# Patient Record
Sex: MALE | Race: WHITE | NOT HISPANIC OR LATINO | Employment: FULL TIME | URBAN - METROPOLITAN AREA
[De-identification: names, ages, dates, MRNs, and addresses within clinical notes are randomized per-mention and may not be internally consistent; named-entity substitution may affect disease eponyms.]

---

## 2023-06-12 ENCOUNTER — PATIENT OUTREACH (OUTPATIENT)
Dept: CARE COORDINATION | Facility: CLINIC | Age: 58
End: 2023-06-12
Payer: COMMERCIAL

## 2023-06-12 ENCOUNTER — DOCUMENTATION (OUTPATIENT)
Dept: CARE COORDINATION | Facility: CLINIC | Age: 58
End: 2023-06-12
Payer: COMMERCIAL

## 2023-06-22 PROBLEM — N52.9 ERECTILE DYSFUNCTION: Status: ACTIVE | Noted: 2023-06-22

## 2023-06-22 PROBLEM — I51.89 DIASTOLIC DYSFUNCTION: Status: ACTIVE | Noted: 2023-06-22

## 2023-06-22 PROBLEM — Z95.5 HISTORY OF CORONARY ARTERY STENT PLACEMENT: Status: ACTIVE | Noted: 2023-06-22

## 2023-06-22 PROBLEM — F17.200 CURRENT EVERY DAY SMOKER: Status: ACTIVE | Noted: 2023-06-22

## 2023-06-22 PROBLEM — E78.5 HYPERLIPIDEMIA: Status: ACTIVE | Noted: 2023-06-22

## 2023-06-22 PROBLEM — I24.0 RCA OCCLUSION (MULTI): Status: ACTIVE | Noted: 2023-06-22

## 2023-06-22 PROBLEM — I25.10 CAD (CORONARY ARTERY DISEASE): Status: ACTIVE | Noted: 2023-06-22

## 2023-06-22 PROBLEM — E55.9 VITAMIN D DEFICIENCY: Status: ACTIVE | Noted: 2023-06-22

## 2023-06-22 PROBLEM — E66.811 OBESITY, CLASS I, BMI 30-34.9: Status: ACTIVE | Noted: 2023-06-22

## 2023-06-22 PROBLEM — E66.9 OBESITY, CLASS I, BMI 30-34.9: Status: ACTIVE | Noted: 2023-06-22

## 2023-06-22 PROBLEM — I22.2 SUBSEQUENT NON-ST ELEVATION (NSTEMI) MYOCARDIAL INFARCTION (MULTI): Status: ACTIVE | Noted: 2023-06-22

## 2023-06-22 PROBLEM — I21.4 NSTEMI, INITIAL EPISODE OF CARE (MULTI): Status: ACTIVE | Noted: 2023-06-22

## 2023-07-12 ENCOUNTER — APPOINTMENT (OUTPATIENT)
Dept: PRIMARY CARE | Facility: CLINIC | Age: 58
End: 2023-07-12
Payer: COMMERCIAL

## 2023-07-14 ENCOUNTER — PATIENT OUTREACH (OUTPATIENT)
Dept: CARE COORDINATION | Facility: CLINIC | Age: 58
End: 2023-07-14
Payer: COMMERCIAL

## 2023-07-19 ENCOUNTER — OFFICE VISIT (OUTPATIENT)
Dept: PRIMARY CARE | Facility: CLINIC | Age: 58
End: 2023-07-19
Payer: COMMERCIAL

## 2023-07-19 VITALS
DIASTOLIC BLOOD PRESSURE: 70 MMHG | BODY MASS INDEX: 33.21 KG/M2 | HEIGHT: 70 IN | WEIGHT: 232 LBS | TEMPERATURE: 97.9 F | RESPIRATION RATE: 20 BRPM | HEART RATE: 80 BPM | SYSTOLIC BLOOD PRESSURE: 122 MMHG

## 2023-07-19 DIAGNOSIS — Z00.00 ENCOUNTER FOR ANNUAL PHYSICAL EXAM: Primary | ICD-10-CM

## 2023-07-19 DIAGNOSIS — Z13.31 DEPRESSION SCREEN: ICD-10-CM

## 2023-07-19 DIAGNOSIS — I21.4 NSTEMI, INITIAL EPISODE OF CARE (MULTI): ICD-10-CM

## 2023-07-19 DIAGNOSIS — Z23 IMMUNIZATION DUE: ICD-10-CM

## 2023-07-19 DIAGNOSIS — I10 PRIMARY HYPERTENSION: ICD-10-CM

## 2023-07-19 DIAGNOSIS — E66.9 OBESITY, CLASS I, BMI 30-34.9: ICD-10-CM

## 2023-07-19 DIAGNOSIS — F17.200 CURRENT EVERY DAY SMOKER: ICD-10-CM

## 2023-07-19 PROBLEM — I24.0 RCA OCCLUSION (MULTI): Status: RESOLVED | Noted: 2023-06-22 | Resolved: 2023-07-19

## 2023-07-19 PROCEDURE — 90715 TDAP VACCINE 7 YRS/> IM: CPT | Performed by: FAMILY MEDICINE

## 2023-07-19 PROCEDURE — 90677 PCV20 VACCINE IM: CPT | Performed by: FAMILY MEDICINE

## 2023-07-19 PROCEDURE — 90471 IMMUNIZATION ADMIN: CPT | Performed by: FAMILY MEDICINE

## 2023-07-19 PROCEDURE — 96127 BRIEF EMOTIONAL/BEHAV ASSMT: CPT | Performed by: FAMILY MEDICINE

## 2023-07-19 PROCEDURE — 90472 IMMUNIZATION ADMIN EACH ADD: CPT | Performed by: FAMILY MEDICINE

## 2023-07-19 PROCEDURE — 99396 PREV VISIT EST AGE 40-64: CPT | Performed by: FAMILY MEDICINE

## 2023-07-19 RX ORDER — NAPROXEN SODIUM 220 MG/1
1 TABLET, FILM COATED ORAL DAILY
COMMUNITY
Start: 2023-06-02

## 2023-07-19 RX ORDER — NITROGLYCERIN 0.4 MG/1
TABLET SUBLINGUAL
COMMUNITY
Start: 2023-06-02

## 2023-07-19 RX ORDER — METOPROLOL SUCCINATE 50 MG/1
1 TABLET, EXTENDED RELEASE ORAL DAILY
COMMUNITY
Start: 2023-06-02

## 2023-07-19 RX ORDER — ATORVASTATIN CALCIUM 40 MG/1
1 TABLET, FILM COATED ORAL NIGHTLY
COMMUNITY
Start: 2023-06-02 | End: 2023-12-20 | Stop reason: SDUPTHER

## 2023-07-19 RX ORDER — LOSARTAN POTASSIUM 50 MG/1
1 TABLET ORAL DAILY
COMMUNITY
Start: 2023-06-08 | End: 2024-05-15

## 2023-07-19 RX ORDER — CLOPIDOGREL BISULFATE 75 MG/1
75 TABLET ORAL DAILY
COMMUNITY

## 2023-07-19 RX ORDER — SILDENAFIL 50 MG/1
TABLET, FILM COATED ORAL
COMMUNITY

## 2023-07-19 ASSESSMENT — ENCOUNTER SYMPTOMS
POLYDIPSIA: 0
DIARRHEA: 0
WEAKNESS: 0
CONSTIPATION: 0
COUGH: 0
NERVOUS/ANXIOUS: 0
SHORTNESS OF BREATH: 0
PALPITATIONS: 0
BACK PAIN: 0
SORE THROAT: 0
VOMITING: 0
RHINORRHEA: 0
FEVER: 0
BLOOD IN STOOL: 0
DYSURIA: 0
NAUSEA: 0
FATIGUE: 0
WHEEZING: 0
NUMBNESS: 0
HEADACHES: 0
HEMATURIA: 0
BRUISES/BLEEDS EASILY: 0
SLEEP DISTURBANCE: 0
ARTHRALGIAS: 0
DYSPHORIC MOOD: 0

## 2023-07-19 ASSESSMENT — PATIENT HEALTH QUESTIONNAIRE - PHQ9
2. FEELING DOWN, DEPRESSED OR HOPELESS: NOT AT ALL
1. LITTLE INTEREST OR PLEASURE IN DOING THINGS: NOT AT ALL
SUM OF ALL RESPONSES TO PHQ9 QUESTIONS 1 AND 2: 0

## 2023-07-19 NOTE — PROGRESS NOTES
"Subjective   Patient ID: Sathish Iqbal is a 57 y.o. male who presents for Annual Exam.    pe         Review of Systems   Constitutional:  Negative for fatigue and fever.   HENT:  Negative for congestion, ear pain, hearing loss, rhinorrhea and sore throat.    Eyes:  Negative for visual disturbance.   Respiratory:  Negative for cough, shortness of breath and wheezing.    Cardiovascular:  Negative for chest pain and palpitations.        Pt had recent mi,  sees card   Gastrointestinal:  Negative for blood in stool, constipation, diarrhea, nausea and vomiting.   Endocrine: Negative for polydipsia and polyuria.   Genitourinary:  Negative for dysuria, hematuria, scrotal swelling and testicular pain.   Musculoskeletal:  Negative for arthralgias and back pain.   Skin:  Negative for rash.   Neurological:  Negative for weakness, numbness and headaches.   Hematological:  Does not bruise/bleed easily.   Psychiatric/Behavioral:  Negative for dysphoric mood, sleep disturbance and suicidal ideas. The patient is not nervous/anxious.        Objective   /70   Pulse 80   Temp 36.6 °C (97.9 °F)   Resp 20   Ht 1.765 m (5' 9.5\")   Wt 105 kg (232 lb)   BMI 33.77 kg/m²     Physical Exam  Vitals and nursing note reviewed.   Constitutional:       General: He is not in acute distress.     Appearance: Normal appearance.      Comments: Pt needs fmla papers completed, advised pt to leave detailed time needed, specialist seen .  Card needs to complete paperwork for release to [facility]   HENT:      Head: Normocephalic and atraumatic.      Right Ear: Tympanic membrane, ear canal and external ear normal.      Left Ear: Tympanic membrane, ear canal and external ear normal.      Nose: Nose normal.      Mouth/Throat:      Mouth: Mucous membranes are moist.      Pharynx: Oropharynx is clear.   Eyes:      Extraocular Movements: Extraocular movements intact.      Conjunctiva/sclera: Conjunctivae normal.      Pupils: Pupils are equal, round, and " reactive to light.   Neck:      Vascular: No carotid bruit.   Cardiovascular:      Rate and Rhythm: Normal rate and regular rhythm.      Heart sounds: Normal heart sounds.   Pulmonary:      Effort: Pulmonary effort is normal.      Breath sounds: Normal breath sounds.   Abdominal:      General: Abdomen is flat. Bowel sounds are normal.      Palpations: Abdomen is soft. There is no mass.      Tenderness: There is no abdominal tenderness.   Musculoskeletal:         General: No swelling or deformity.      Cervical back: Neck supple.   Lymphadenopathy:      Cervical: No cervical adenopathy.   Skin:     General: Skin is warm and dry.   Neurological:      General: No focal deficit present.      Mental Status: He is alert.   Psychiatric:         Mood and Affect: Mood normal.         Behavior: Behavior normal.         Assessment/Plan   Problem List Items Addressed This Visit       Current every day smoker     Pt is urged to quit smoking  Pt is decreasing cigarette use little by little and plans to quit.         NSTEMI, initial episode of care (CMS/[facility])     Pt is under the care of cardiology  On asa, clopidogrel,NTG  Has been seen in ER 6/2023, 7/2023         Relevant Medications    nitroglycerin (Nitrostat) 0.4 mg SL tablet    clopidogrel (Plavix) 75 mg tablet    metoprolol succinate XL (Toprol-XL) 50 mg 24 hr tablet    sildenafil (Viagra) 50 mg tablet    Obesity, Class I, BMI 30-34.9     Advise healthy diet and exercise  Offered nutritional counseling, pt declined today  Ho printed         Hypertension     Pt currently on losartan and metoprolol  Med well tolerated, has been stable         Encounter for annual physical exam - Primary    Relevant Orders    Prostate Specific Antigen    TSH with reflex to Free T4 if abnormal    Vitamin D 25-Hydroxy,Total    Colonoscopy    Depression screen     Other Visit Diagnoses       Immunization due        Relevant Orders    Pneumococcal conjugate vaccine, 20-valent, adult (PREVNAR 20)     Tdap vaccine, age 10 years and older (BOOSTRIX)        Pt had other bw done through  ER/hosp admission.     Patient reported health Good    Regular Dental Visits yes    Regular Eye Visits no    Hearing Loss no    Balanced Diet yes    Weight Concerns no    Exercise Irregular    Will give shingrix at later date

## 2023-12-20 DIAGNOSIS — E78.2 MIXED HYPERLIPIDEMIA: ICD-10-CM

## 2023-12-20 RX ORDER — ATORVASTATIN CALCIUM 40 MG/1
40 TABLET, FILM COATED ORAL NIGHTLY
Qty: 90 TABLET | Refills: 3 | Status: SHIPPED | OUTPATIENT
Start: 2023-12-20

## 2024-01-23 ENCOUNTER — OFFICE VISIT (OUTPATIENT)
Dept: CARDIOLOGY | Facility: CLINIC | Age: 59
End: 2024-01-23
Payer: COMMERCIAL

## 2024-01-23 VITALS
SYSTOLIC BLOOD PRESSURE: 118 MMHG | HEART RATE: 68 BPM | DIASTOLIC BLOOD PRESSURE: 62 MMHG | BODY MASS INDEX: 32.57 KG/M2 | HEIGHT: 70 IN | WEIGHT: 227.5 LBS

## 2024-01-23 DIAGNOSIS — F17.200 CURRENT EVERY DAY SMOKER: ICD-10-CM

## 2024-01-23 DIAGNOSIS — E78.2 MIXED HYPERLIPIDEMIA: ICD-10-CM

## 2024-01-23 DIAGNOSIS — Z95.5 HISTORY OF CORONARY ARTERY STENT PLACEMENT: ICD-10-CM

## 2024-01-23 DIAGNOSIS — I10 PRIMARY HYPERTENSION: ICD-10-CM

## 2024-01-23 DIAGNOSIS — I25.10 CORONARY ARTERY DISEASE INVOLVING NATIVE CORONARY ARTERY OF NATIVE HEART WITHOUT ANGINA PECTORIS: ICD-10-CM

## 2024-01-23 PROCEDURE — 3078F DIAST BP <80 MM HG: CPT | Performed by: INTERNAL MEDICINE

## 2024-01-23 PROCEDURE — 99214 OFFICE O/P EST MOD 30 MIN: CPT | Performed by: INTERNAL MEDICINE

## 2024-01-23 PROCEDURE — 3008F BODY MASS INDEX DOCD: CPT | Performed by: INTERNAL MEDICINE

## 2024-01-23 PROCEDURE — 3074F SYST BP LT 130 MM HG: CPT | Performed by: INTERNAL MEDICINE

## 2024-01-23 NOTE — PATIENT INSTRUCTIONS
Continue same medications/treatment.  Patient educated on proper medication use.  Patient educated on risk factor modification.  Please bring any lab results from other providers/physicians to your next appointment.    Please bring all medicines, vitamins, and herbal supplements with you when you come to the office.    Prescriptions will not be filled unless you are compliant with your follow up appointments or have a follow up appointment scheduled as per instruction of your physician. Refills should be requested at the time of your visit.    Follow up in 6 month    I, SEDA CHRISTOPHER RN, AM SCRIBING FOR AND IN THE PRESENCE OF DR. SHEA HERNÁNDEZ, DO, FACC

## 2024-01-23 NOTE — PROGRESS NOTES
Patient:  Sathish Iqbal  YOB: 1965  MRN: [id number]       Chief Complaint/Active Symptoms:       Sathish Iqbal is a 58 y.o. male who returns today for cardiac follow-up.      Objective:     Vitals:    01/23/24 1247   BP: 118/62   Pulse: 68       Vitals:    01/23/24 1247   Weight: 103 kg (227 lb 8 oz)       Allergies:     No Known Allergies       Medications:     Current Outpatient Medications   Medication Instructions    aspirin 81 mg chewable tablet 1 tablet, oral, Daily    atorvastatin (LIPITOR) 40 mg, oral, Nightly    clopidogrel (PLAVIX) 75 mg, oral, Daily    losartan (Cozaar) 50 mg tablet 1 tablet, oral, Daily    metoprolol succinate XL (Toprol-XL) 50 mg 24 hr tablet 1 tablet, oral, Daily    nitroglycerin (Nitrostat) 0.4 mg SL tablet sublingual    sildenafil (Viagra) 50 mg tablet oral       Physical Examination:   Constitutional:       Appearance: Healthy appearance. Not in distress.   Neck:      Vascular: No JVR. JVD normal.   Pulmonary:      Effort: Pulmonary effort is normal.      Breath sounds: Normal breath sounds. No wheezing. No rhonchi. No rales.   Chest:      Chest wall: Not tender to palpatation.   Cardiovascular:      PMI at left midclavicular line. Normal rate. Regular rhythm. Normal S1. Normal S2.       Murmurs: There is no murmur.      No gallop.  No click. No rub.   Pulses:     Intact distal pulses.   Edema:     Peripheral edema absent.   Abdominal:      General: Bowel sounds are normal.      Palpations: Abdomen is soft.      Tenderness: There is no abdominal tenderness.   Musculoskeletal: Normal range of motion.         General: No tenderness. Skin:     General: Skin is warm and dry.   Neurological:      General: No focal deficit present.      Mental Status: Alert and oriented to person, place and time.            Lab:     CBC:   Lab Results   Component Value Date    WBC 8.9 07/17/2023    RBC 4.51 07/17/2023    HGB 14.1 07/17/2023    HCT 40.8 (L) 07/17/2023      "07/17/2023        CMP:    Lab Results   Component Value Date     07/17/2023    K 4.4 07/17/2023     07/17/2023    CO2 24 07/17/2023    BUN 13 07/17/2023    CREATININE 0.96 07/17/2023    GLUCOSE 109 (H) 07/17/2023    CALCIUM 9.2 07/17/2023       Magnesium:    Lab Results   Component Value Date    MG 2.30 07/17/2023       Lipid Profile:    Lab Results   Component Value Date    TRIG 203 (H) 06/03/2023    TRIG CANCELED 06/03/2023    HDL 39.3 (A) 06/03/2023    HDL CANCELED 06/03/2023       TSH:    Lab Results   Component Value Date    TSH 1.63 04/24/2021       BNP:   Lab Results   Component Value Date     (H) 07/17/2023        PT/INR:    Lab Results   Component Value Date    PROTIME 11.3 07/17/2023    INR 1.0 07/17/2023       HgBA1c:    No results found for: \"HGBA1C\"    BMP:  Lab Results   Component Value Date     07/17/2023     06/02/2023     06/02/2023    K 4.4 07/17/2023    K 4.4 06/02/2023    K 4.1 06/02/2023     07/17/2023     06/02/2023     06/02/2023    CO2 24 07/17/2023    CO2 27 06/02/2023    CO2 26 06/02/2023    BUN 13 07/17/2023    BUN 14 06/02/2023    BUN 15 06/02/2023    CREATININE 0.96 07/17/2023    CREATININE 0.89 06/02/2023    CREATININE 1.01 06/02/2023       CBC:  Lab Results   Component Value Date    WBC 8.9 07/17/2023    WBC 13.5 (H) 06/02/2023    WBC 10.6 06/02/2023    RBC 4.51 07/17/2023    RBC 4.85 06/02/2023    RBC 5.16 06/02/2023    HGB 14.1 07/17/2023    HGB 15.1 06/02/2023    HGB 15.9 06/02/2023    HCT 40.8 (L) 07/17/2023    HCT 44.0 06/02/2023    HCT 46.8 06/02/2023    MCV 90 07/17/2023    MCV 91 06/02/2023    MCV 91 06/02/2023    MCHC 34.6 07/17/2023    MCHC 34.3 06/02/2023    MCHC 34.0 06/02/2023    RDW 12.3 07/17/2023    RDW 12.2 06/02/2023    RDW 12.0 06/02/2023     07/17/2023     06/02/2023     06/02/2023       Cardiac Enzymes:    Lab Results   Component Value Date    TROPHS CANCELED 07/18/2023    TROPHS 6 " "07/17/2023    TROPHS 7 07/17/2023       Hepatic Function Panel:    Lab Results   Component Value Date    ALKPHOS 64 07/17/2023    ALT 53 (H) 07/17/2023    AST 34 07/17/2023    PROT 6.9 07/17/2023    BILITOT 0.6 07/17/2023         Diagnostic Studies:     XR chest 1 view    Result Date: 7/17/2023  Interpreted By:  MARY ANN REDDY MD STUDY: Chest Radiograph;  7/17/2023 10:40 AM.  INDICATION: Chest pain.  Hypertension.  COMPARISON: CXR 6/2/2023  ACCESSION NUMBER(S): [id number]  ORDERING CLINICIAN: STACEY MORENO MD  TECHNIQUE:  Frontal chest was obtained at 12:35 hours.  FINDINGS:  CARDIOMEDIASTINAL SILHOUETTE: Cardiomediastinal silhouette is prominent in size.  LUNGS: Lungs are clear.  ABDOMEN: No remarkable upper abdominal findings.  BONES: No acute osseous changes.      No radiographic evidence of acute cardiopulmonary process.   Signed by Mary Ann Reddy MD      EKG:   No results found for: \"EKG\"    Cardiac Cath Transition of Care Summary:  Post Procedure Diagnosis: Single vessel disease.  Blood Loss:               Estimated blood loss during the procedure was 0 mls.  Specimens Removed:        Number of specimen(s) removed: none.     ____________________________________________________________________________________  CONCLUSIONS:  1. The entire Left Main: <10% stenosis.  2. Proximal LAD Lesion: The percent stenosis is 10-30%.  3. Mid LAD Lesion: The percent stenosis is 40%.  4. Entire CX Lesion: The percent stenosis is <10%.  5. Proximal RCA Lesion: The percent stenosis is 100%,small.  6. The Left Ventricular Ejection Fraction is 55%.     ____________________________________________________________________________________  CPT Codes:  Left Heart Cath (visualization of coronaries) and LV-54282; Moderate Sedation Services initial 15 minutes patient >5 [id number]     ICD 10 Codes:  I21.4-Non ST elevation (NSTEMI) myocardial infarction     [id number] Roman Hermosillo MD  Performing Physician  Electronically signed by " [id number] Roman Hermosillo MD on 6/2/2023 at 3:16:16  PM        cc Report to: ADENIKE GILLETTE     cc Report to: [id number] Roman Hermosillo MD    [place] [address]  Tel [phone] Fax [phone]     TRANSTHORACIC ECHOCARDIOGRAM REPORT        Patient Name:     BRODY COX   Reading Physician:  [id number] Eliezer Puente DO  Study Date:       6/2/2023           Referring           LLUVIA ROMAN  Physician:  MRN/PID:          [id number]           PCP:  Accession/Order#: [id number]          Department          [facility] Echo  Location:           Lab  YOB: 1965          Fellow:  Gender:           M                  Nurse:  Admit Date:       6/2/2023           Sonographer:        Jacey Connell [facility]  Admission Status: Inpatient -        Additional Staff:   TRI C STUDENT  Routine  Height:           180.00 cm          CC Report to:       8Deanna Miranda  Weight:           103.00 kg          Study Type:         Echocardiogram  BSA:              2.22 m2  Blood Pressure: 168 /102 mmHg     Diagnosis/ICD: R07.9-Chest pain, unspecified  Indication:    Chest Pain  Procedure/CPT: Echo Complete w Full Doppler-[id number]     Patient History:  MI Location/Type:  Non-ST Elevation MI  Smoker:            Current.  Pertinent History: Chest Pain.     Study Detail: The following Echo studies were performed: 2D, M-Mode, Doppler and  color flow. Technically challenging study due to poor acoustic  windows and prominent lung artifact. A bubble study was not  performed.        PHYSICIAN INTERPRETATION:  Left Ventricle: Left ventricular systolic function is normal, with an estimated ejection fraction of 60%. There are no regional wall motion abnormalities. The left ventricular cavity size is normal. There is moderate concentric left ventricular hypertrophy. Spectral Doppler shows an impaired relaxation pattern of left ventricular diastolic filling.  LV Wall Scoring:  All segments are  normal.     Left Atrium: The left atrium is mildly dilated.  Right Ventricle: The right ventricle is normal in size. There is normal right ventricular global systolic function.  Right Atrium: The right atrium is normal in size.  Aortic Valve: The aortic valve appears structurally normal. The aortic valve appears tricuspid. There is no evidence of aortic valve stenosis.  There is no evidence of aortic valve regurgitation. The peak instantaneous gradient of the aortic valve is 4.8 mmHg. The mean gradient of the aortic valve is 3.0 mmHg.  Mitral Valve: The mitral valve is normal in structure. There is no evidence of mitral valve stenosis. There is normal mitral valve leaflet mobility. There is mild mitral valve regurgitation.  Tricuspid Valve: The tricuspid valve is structurally normal. There is normal tricuspid valve leaflet mobility. There is mild tricuspid regurgitation.  Pulmonic Valve: The pulmonic valve is structurally normal. There is no indication of pulmonic valve regurgitation.  Pericardium: There is no pericardial effusion noted.  Aorta: The aortic root is normal.  Pulmonary Artery: The main pulmonary artery is normal in size, and position, with normal bifurcation into the left and right pulmonary arteries.  Systemic Veins: The inferior vena cava appears mildly dilated.  In comparison to the previous echocardiogram(s): There are no prior studies on this patient for comparison purposes.        CONCLUSIONS:  1. Left ventricular systolic function is normal with a 60% estimated ejection fraction.  2. Spectral Doppler shows an impaired relaxation pattern of left ventricular diastolic filling.  3. There is moderate concentric left ventricular hypertrophy.  4. There is no evidence of mitral valve stenosis.  5. Mild tricuspid regurgitation is visualized.  6. Aortic valve stenosis is not present.  7. The main pulmonary artery is normal in size, and position, with normal bifurcation into the left and right pulmonary  arteries.     RECOMMENDATIONS:  Technically suboptimal and limited study, therefore accuracy of above interpretation could be substantially diminished. Clinical correlation is advised. Consider additional imaging modalities if clinically indicated.  Radiology:     No orders to display       Assessment/Plan:         Patient Active Problem List   Diagnosis    CAD (coronary artery disease)    Current every day smoker    Diastolic dysfunction    Erectile dysfunction    History of coronary artery stent placement    Hyperlipidemia    NSTEMI, initial episode of care (CMS/[facility])    Obesity, Class I, BMI 30-34.9    Subsequent non-ST elevation (NSTEMI) myocardial infarction (CMS/[facility])    Vitamin D deficiency    Hypertension    Encounter for annual physical exam    Depression screen    BMI 33.0-33.9,adult         ASSESSMENT         PLAN   58-year-old gentleman here for routine cardiovascular follow-up.      Meds, vitals, examination are as noted.     Chart was reviewed in detail and discussed with the patient and his family.     Impression:  ASHD class I-II  Recent non-STEMI with no significant interventions necessary  Normal LV function  Smoking abuse with continued improvement  Dyslipidemia  Mildly overweight.     Recommendation:  Maintain medical therapy  Smoking cessation  Exercise and weight loss  No significant cardiovascular disease related restrictions on work or activities  Any condition change notify office  See me at 6-8 month intervals

## 2024-05-11 ENCOUNTER — APPOINTMENT (OUTPATIENT)
Dept: RADIOLOGY | Facility: HOSPITAL | Age: 59
End: 2024-05-11
Payer: COMMERCIAL

## 2024-05-11 ENCOUNTER — HOSPITAL ENCOUNTER (EMERGENCY)
Facility: HOSPITAL | Age: 59
Discharge: HOME | End: 2024-05-11
Payer: COMMERCIAL

## 2024-05-11 VITALS
OXYGEN SATURATION: 98 % | HEART RATE: 69 BPM | RESPIRATION RATE: 18 BRPM | BODY MASS INDEX: 45.1 KG/M2 | DIASTOLIC BLOOD PRESSURE: 91 MMHG | TEMPERATURE: 97.9 F | SYSTOLIC BLOOD PRESSURE: 169 MMHG | HEIGHT: 70 IN | WEIGHT: 315 LBS

## 2024-05-11 DIAGNOSIS — S60.222A CONTUSION OF LEFT HAND, INITIAL ENCOUNTER: Primary | ICD-10-CM

## 2024-05-11 DIAGNOSIS — S60.512A ABRASION OF LEFT HAND, INITIAL ENCOUNTER: ICD-10-CM

## 2024-05-11 PROCEDURE — 2500000001 HC RX 250 WO HCPCS SELF ADMINISTERED DRUGS (ALT 637 FOR MEDICARE OP): Performed by: PHYSICIAN ASSISTANT

## 2024-05-11 PROCEDURE — 99283 EMERGENCY DEPT VISIT LOW MDM: CPT

## 2024-05-11 PROCEDURE — 73130 X-RAY EXAM OF HAND: CPT | Mod: LT

## 2024-05-11 PROCEDURE — 73130 X-RAY EXAM OF HAND: CPT | Mod: LEFT SIDE | Performed by: RADIOLOGY

## 2024-05-11 RX ORDER — BACITRACIN 500 [USP'U]/G
OINTMENT TOPICAL ONCE
Status: COMPLETED | OUTPATIENT
Start: 2024-05-11 | End: 2024-05-11

## 2024-05-11 RX ADMIN — BACITRACIN: 500 OINTMENT TOPICAL at 11:39

## 2024-05-11 ASSESSMENT — LIFESTYLE VARIABLES
EVER HAD A DRINK FIRST THING IN THE MORNING TO STEADY YOUR NERVES TO GET RID OF A HANGOVER: NO
HAVE PEOPLE ANNOYED YOU BY CRITICIZING YOUR DRINKING: NO
HAVE YOU EVER FELT YOU SHOULD CUT DOWN ON YOUR DRINKING: NO
TOTAL SCORE: 0
EVER FELT BAD OR GUILTY ABOUT YOUR DRINKING: NO

## 2024-05-11 ASSESSMENT — PAIN SCALES - GENERAL: PAINLEVEL_OUTOF10: 4

## 2024-05-11 ASSESSMENT — PAIN - FUNCTIONAL ASSESSMENT: PAIN_FUNCTIONAL_ASSESSMENT: 0-10

## 2024-05-11 ASSESSMENT — COLUMBIA-SUICIDE SEVERITY RATING SCALE - C-SSRS
1. IN THE PAST MONTH, HAVE YOU WISHED YOU WERE DEAD OR WISHED YOU COULD GO TO SLEEP AND NOT WAKE UP?: NO
6. HAVE YOU EVER DONE ANYTHING, STARTED TO DO ANYTHING, OR PREPARED TO DO ANYTHING TO END YOUR LIFE?: NO
2. HAVE YOU ACTUALLY HAD ANY THOUGHTS OF KILLING YOURSELF?: NO

## 2024-05-11 NOTE — ED PROVIDER NOTES
"HPI   Chief Complaint   Patient presents with    Hand Injury     \"I smashed left hand at work on a lift gate and here to get it checked out.l  happened at 1240 yesterday.  Works at [employer].\"         History provided by:  Patient   used: No         58-year-old male presents with left hand pain and swelling after he smashed his hand in between a lift gate 24 hours ago at work.  He states that he called the telephone number from work that connected him to a nurse line and she suggested to use ice and anti-inflammatories.  He wants to make sure there is no fracture.  Denies temp or sensation changes    ROS negative unless otherwise stated in HPI                 [place] Coma Scale Score: 15                     Patient History   Past Medical History:   Diagnosis Date    Hyperlipidemia     RCA occlusion (Multi) 06/22/2023     Past Surgical History:   Procedure Laterality Date    OTHER SURGICAL HISTORY  04/16/2021    No history of surgery     Family History   Problem Relation Name Age of Onset    Diabetes Mother      Diabetes Father      Heart attack Father       Social History     Tobacco Use    Smoking status: Every Day     Current packs/day: 0.25     Types: Cigarettes    Smokeless tobacco: Never   Substance Use Topics    Alcohol use: Yes     Alcohol/week: 6.0 - 8.0 standard drinks of alcohol     Types: 6 - 8 Cans of beer per week    Drug use: Never       Physical Exam   ED Triage Vitals [05/11/24 0934]   Temperature Heart Rate Respirations BP   36.6 °C (97.9 °F) 69 18 (!) 169/91      Pulse Ox Temp Source Heart Rate Source Patient Position   98 % Temporal Monitor Sitting      BP Location FiO2 (%)     Right arm --       Physical Exam    General: alert, no distress, well nourished, talking in full and complete sentences  Skin: dry, intact, no rashes  Head: atraumatic  Eyes: EOMI, PERRLA, normal conjunctiva  Throat: no stridor, no hot potato voice  Nose: nares patent  Mouth: mucous membranes " moist  Respiratory: non labored breathing  Extremities: Swelling to the left hand throughout, 4 cm horizontal abrasion to the posterior hand, blue ecchymosis to the palm, FROM X4, strength +5/5, pulses intact, capillary refill intact  Neuro: A&Ox3  Psych: cooperative, appropriate mood      ED Course & MDM   Diagnoses as of 05/11/24 1132   Contusion of left hand, initial encounter   Abrasion of left hand, initial encounter   Labs Reviewed - No data to display   XR hand left 3+ views   Final Result   Marked soft tissue swelling without fracture or dislocation.        Signed by: Lynsey Stein 5/11/2024 11:22 AM   Dictation workstation:   [id number]          Medical Decision Making  No acute findings on final read of hand x-ray.  Bacitracin applied to abrasion.  He is to use over-the-counter and inflammatories and follow-up with occupational health.  Afebrile, not tachycardic, not tachypneic, not hypoxic, tolerating PO, non toxic appearing and ambulating at baseline at discharge. Hemodynamically intact. Patient instructed on return precautions. All questions answered. Patient in agreement with treatment.      Procedure  Procedures     Geno Bonilla PA-C  05/11/24 1132

## 2024-05-14 DIAGNOSIS — I10 PRIMARY HYPERTENSION: ICD-10-CM

## 2024-05-15 RX ORDER — LOSARTAN POTASSIUM 50 MG/1
50 TABLET ORAL DAILY
Qty: 90 TABLET | Refills: 3 | Status: SHIPPED | OUTPATIENT
Start: 2024-05-15 | End: 2025-05-15

## 2024-05-15 NOTE — TELEPHONE ENCOUNTER
Received request for prescription refills for patient.   Patient follows with Dr. Puente    Request is for Losartan 50mg QD  Is patient currently on medication yes    Last OV 1/2024  Next OV 7/2024    Pended for signing and sent to provider

## 2024-06-11 DIAGNOSIS — I25.10 CORONARY ARTERY DISEASE INVOLVING NATIVE CORONARY ARTERY OF NATIVE HEART WITHOUT ANGINA PECTORIS: ICD-10-CM

## 2024-06-11 DIAGNOSIS — I22.2 SUBSEQUENT NON-ST ELEVATION (NSTEMI) MYOCARDIAL INFARCTION (MULTI): ICD-10-CM

## 2024-06-11 DIAGNOSIS — I10 PRIMARY HYPERTENSION: ICD-10-CM

## 2024-06-11 RX ORDER — CLOPIDOGREL BISULFATE 75 MG/1
75 TABLET ORAL DAILY
Qty: 90 TABLET | Refills: 3 | Status: SHIPPED | OUTPATIENT
Start: 2024-06-11

## 2024-06-11 RX ORDER — METOPROLOL SUCCINATE 50 MG/1
50 TABLET, EXTENDED RELEASE ORAL DAILY
Qty: 90 TABLET | Refills: 3 | Status: SHIPPED | OUTPATIENT
Start: 2024-06-11

## 2024-06-20 DIAGNOSIS — I25.10 CORONARY ARTERY DISEASE INVOLVING NATIVE CORONARY ARTERY OF NATIVE HEART WITHOUT ANGINA PECTORIS: ICD-10-CM

## 2024-06-20 RX ORDER — NAPROXEN SODIUM 220 MG/1
81 TABLET, FILM COATED ORAL DAILY
Qty: 90 TABLET | Refills: 0 | Status: SHIPPED | OUTPATIENT
Start: 2024-06-20

## 2024-07-23 ENCOUNTER — APPOINTMENT (OUTPATIENT)
Dept: CARDIOLOGY | Facility: CLINIC | Age: 59
End: 2024-07-23
Payer: COMMERCIAL

## 2024-07-23 VITALS
HEIGHT: 70 IN | BODY MASS INDEX: 33.37 KG/M2 | HEART RATE: 72 BPM | DIASTOLIC BLOOD PRESSURE: 72 MMHG | SYSTOLIC BLOOD PRESSURE: 130 MMHG | WEIGHT: 233.1 LBS

## 2024-07-23 DIAGNOSIS — I10 PRIMARY HYPERTENSION: ICD-10-CM

## 2024-07-23 DIAGNOSIS — Z95.5 HISTORY OF CORONARY ARTERY STENT PLACEMENT: ICD-10-CM

## 2024-07-23 DIAGNOSIS — I25.10 CORONARY ARTERY DISEASE INVOLVING NATIVE CORONARY ARTERY OF NATIVE HEART WITHOUT ANGINA PECTORIS: ICD-10-CM

## 2024-07-23 DIAGNOSIS — E78.2 MIXED HYPERLIPIDEMIA: ICD-10-CM

## 2024-07-23 PROCEDURE — 3075F SYST BP GE 130 - 139MM HG: CPT | Performed by: INTERNAL MEDICINE

## 2024-07-23 PROCEDURE — 99214 OFFICE O/P EST MOD 30 MIN: CPT | Performed by: INTERNAL MEDICINE

## 2024-07-23 PROCEDURE — 3078F DIAST BP <80 MM HG: CPT | Performed by: INTERNAL MEDICINE

## 2024-07-23 PROCEDURE — 3008F BODY MASS INDEX DOCD: CPT | Performed by: INTERNAL MEDICINE

## 2024-07-23 RX ORDER — CLOPIDOGREL BISULFATE 75 MG/1
75 TABLET ORAL DAILY
Qty: 90 TABLET | Refills: 3 | Status: SHIPPED | OUTPATIENT
Start: 2024-07-23

## 2024-07-23 RX ORDER — METOPROLOL SUCCINATE 50 MG/1
50 TABLET, EXTENDED RELEASE ORAL DAILY
Qty: 90 TABLET | Refills: 3 | Status: SHIPPED | OUTPATIENT
Start: 2024-07-23

## 2024-07-23 RX ORDER — ATORVASTATIN CALCIUM 40 MG/1
40 TABLET, FILM COATED ORAL NIGHTLY
Qty: 90 TABLET | Refills: 3 | Status: SHIPPED | OUTPATIENT
Start: 2024-07-23

## 2024-07-23 RX ORDER — NITROGLYCERIN 0.4 MG/1
0.4 TABLET SUBLINGUAL EVERY 5 MIN PRN
Qty: 25 TABLET | Refills: 5 | Status: SHIPPED | OUTPATIENT
Start: 2024-07-23

## 2024-07-23 RX ORDER — LOSARTAN POTASSIUM 50 MG/1
50 TABLET ORAL DAILY
Qty: 90 TABLET | Refills: 3 | Status: SHIPPED | OUTPATIENT
Start: 2024-07-23 | End: 2025-07-23

## 2024-07-23 NOTE — PROGRESS NOTES
Patient:  Sathish Iqbal  YOB: 1965  MRN: [id number]       HPI:       Sathish Iqbal is a 58 y.o. male who returns today for cardiac follow-up.  Patient is doing well.  He denies chest pain or shortness of breath.  Has a history of prior non-STEMI with no intervention.  LV function is normal.  He claims that he is can be traveling to [place] next few weeks.  Apparently he is going to be getting remarried.  No overt cardiovascular or other medical issues are present at this time.      Objective:     Vitals:    07/23/24 1300   BP: 130/72   Pulse: 72       Wt Readings from Last 4 Encounters:   07/23/24 106 kg (233 lb 1.6 oz)   05/11/24 (!) 227 kg (500 lb 7.1 oz)   01/23/24 103 kg (227 lb 8 oz)   09/26/23 104 kg (230 lb 3 oz)       Allergies:     No Known Allergies     Medications:     Current Outpatient Medications   Medication Instructions    aspirin 81 mg, oral, Daily    atorvastatin (LIPITOR) 40 mg, oral, Nightly    clopidogrel (PLAVIX) 75 mg, oral, Daily    losartan (COZAAR) 50 mg, oral, Daily    metoprolol succinate XL (TOPROL-XL) 50 mg, oral, Daily    nitroglycerin (Nitrostat) 0.4 mg SL tablet sublingual    sildenafil (VIAGRA) 50 mg, oral, As needed       Physical Examination:   GENERAL:  Well developed, well nourished, in no acute distress.  CHEST:  Symmetric and nontender.  NEURO/PSYCH:  Alert and oriented times three with approppriate behavior and responses.  NECK:  Supple, no JVD, no bruit.  LUNGS:  Clear to auscultation bilaterally, normal respiratory effort.  HEART:  Rate and rhythm regular with no evident murmur, no gallop appreciated.        There are no rubs, clicks or heaves.  EXTREMITIES:  Warm with good color, no clubbing or cyanosis.  There is no edema noted.  PERIPHERAL VASCULAR:  Pulses present and equally palpable; 2+ throughout.      Lab:     CBC:   Lab Results   Component Value Date    WBC 8.9 07/17/2023    RBC 4.51 07/17/2023    HGB 14.1 07/17/2023    HCT 40.8 (L) 07/17/2023  "    07/17/2023        CMP:    Lab Results   Component Value Date     07/17/2023    K 4.4 07/17/2023     07/17/2023    CO2 24 07/17/2023    BUN 13 07/17/2023    CREATININE 0.96 07/17/2023    GLUCOSE 109 (H) 07/17/2023    CALCIUM 9.2 07/17/2023       Magnesium:    Lab Results   Component Value Date    MG 2.30 07/17/2023       Lipid Profile:    Lab Results   Component Value Date    TRIG 203 (H) 06/03/2023    TRIG CANCELED 06/03/2023    HDL 39.3 (A) 06/03/2023    HDL CANCELED 06/03/2023       TSH:    Lab Results   Component Value Date    TSH 1.63 04/24/2021       BNP:   Lab Results   Component Value Date     (H) 07/17/2023        PT/INR:    Lab Results   Component Value Date    PROTIME 11.3 07/17/2023    INR 1.0 07/17/2023       HgBA1c:    No results found for: \"HGBA1C\"    BMP:  Lab Results   Component Value Date     07/17/2023     06/02/2023     06/02/2023    K 4.4 07/17/2023    K 4.4 06/02/2023    K 4.1 06/02/2023     07/17/2023     06/02/2023     06/02/2023    CO2 24 07/17/2023    CO2 27 06/02/2023    CO2 26 06/02/2023    BUN 13 07/17/2023    BUN 14 06/02/2023    BUN 15 06/02/2023    CREATININE 0.96 07/17/2023    CREATININE 0.89 06/02/2023    CREATININE 1.01 06/02/2023       CBC:  Lab Results   Component Value Date    WBC 8.9 07/17/2023    WBC 13.5 (H) 06/02/2023    WBC 10.6 06/02/2023    RBC 4.51 07/17/2023    RBC 4.85 06/02/2023    RBC 5.16 06/02/2023    HGB 14.1 07/17/2023    HGB 15.1 06/02/2023    HGB 15.9 06/02/2023    HCT 40.8 (L) 07/17/2023    HCT 44.0 06/02/2023    HCT 46.8 06/02/2023    MCV 90 07/17/2023    MCV 91 06/02/2023    MCV 91 06/02/2023    MCHC 34.6 07/17/2023    MCHC 34.3 06/02/2023    MCHC 34.0 06/02/2023    RDW 12.3 07/17/2023    RDW 12.2 06/02/2023    RDW 12.0 06/02/2023     07/17/2023     06/02/2023     06/02/2023       Cardiac Enzymes:    Lab Results   Component Value Date    TROPHS CANCELED 07/18/2023    TROPHS " 6 07/17/2023    TROPHS 7 07/17/2023       Hepatic Function Panel:    Lab Results   Component Value Date    ALKPHOS 64 07/17/2023    ALT 53 (H) 07/17/2023    AST 34 07/17/2023    PROT 6.9 07/17/2023    BILITOT 0.6 07/17/2023       Diagnostic Studies:     XR hand left 3+ views    Result Date: 5/11/2024  Interpreted By:  Lynsey Stein, STUDY: XR HAND LEFT 3+ VIEWS 5/11/2024 10:59 am   INDICATION: Pain and swelling with crush injury   COMPARISON: None available.   ACCESSION NUMBER(S): [id number]   ORDERING CLINICIAN: APRIL HILARIO   TECHNIQUE: 3 portable views of the left hand   FINDINGS: There is marked soft tissue swelling of the hand. No fracture or dislocation is seen. There is 7-8 mm subchondral cyst within the lunate. Radiocarpal joint space narrowing is present.       Marked soft tissue swelling without fracture or dislocation.   Signed by: Lynsey Stein 5/11/2024 11:22 AM Dictation workstation:   [id number]      Cardiac Cath Transition of Care Summary:  Post Procedure Diagnosis: Single vessel disease.  Blood Loss:               Estimated blood loss during the procedure was 0 mls.  Specimens Removed:        Number of specimen(s) removed: none.     ____________________________________________________________________________________  CONCLUSIONS:  1. The entire Left Main: <10% stenosis.  2. Proximal LAD Lesion: The percent stenosis is 10-30%.  3. Mid LAD Lesion: The percent stenosis is 40%.  4. Entire CX Lesion: The percent stenosis is <10%.  5. Proximal RCA Lesion: The percent stenosis is 100%,small.  6. The Left Ventricular Ejection Fraction is 55%.     ____________________________________________________________________________________  CPT Codes:  Left Heart Cath (visualization of coronaries) and LV-[id number]; Moderate Sedation Services initial 15 minutes patient >5 years-[id number]     ICD 10 Codes:  I21.4-Non ST elevation (NSTEMI) myocardial infarction     [id number] Roman Hermosillo MD  Performing  Physician  Electronically signed by [id number] Roman Hermosillo MD on 6/2/2023 at 3:16:16  PM        cc Report to: ADENIKE GILLETTE     cc Report to: [id number] Roman Hermosillo MD     cc Report to: [id number] Tamy Pascual MD         [place] [id number]  Tel [phone] Fax [phone]     TRANSTHORACIC ECHOCARDIOGRAM REPORT        Patient Name:     BRODY Andino Physician:  [id number] Eliezer Puente DO  Study Date:       6/2/2023           Referring           LLUVIA ROMAN  Physician:  MRN/PID:          [id number]           PCP:  Accession/Order#: [id number]          [facility] Echo  Location:           Lab  YOB: 1965          Fellow:  Gender:           M                  Nurse:  Admit Date:       6/2/2023           Sonographer:        Jacey Connell RDCS  Admission Status: Inpatient -        Additional Staff:   TRI C STUDENT  Routine  Height:           180.00 cm          CC Report to:       8Deanna BELLocation  Weight:           103.00 kg          Study Type:         Echocardiogram  BSA:              2.22 m2  Blood Pressure: 168 /102 mmHg     Diagnosis/ICD: R07.9-Chest pain, unspecified  Indication:    Chest Pain  Procedure/CPT: Echo Complete w Full Doppler-[id number]     Patient History:  MI Location/Type:  Non-ST Elevation MI  Smoker:            Current.  Pertinent History: Chest Pain.     Study Detail: The following Echo studies were performed: 2D, M-Mode, Doppler and  color flow. Technically challenging study due to poor acoustic  windows and prominent lung artifact. A bubble study was not  performed.        PHYSICIAN INTERPRETATION:  Left Ventricle: Left ventricular systolic function is normal, with an estimated ejection fraction of 60%. There are no regional wall motion abnormalities. The left ventricular cavity size is normal. There is moderate concentric left ventricular hypertrophy. Spectral Doppler shows an impaired  relaxation pattern of left ventricular diastolic filling.  LV Wall Scoring:  All segments are normal.     Left Atrium: The left atrium is mildly dilated.  Right Ventricle: The right ventricle is normal in size. There is normal right ventricular global systolic function.  Right Atrium: The right atrium is normal in size.  Aortic Valve: The aortic valve appears structurally normal. The aortic valve appears tricuspid. There is no evidence of aortic valve stenosis.  There is no evidence of aortic valve regurgitation. The peak instantaneous gradient of the aortic valve is 4.8 mmHg. The mean gradient of the aortic valve is 3.0 mmHg.  Mitral Valve: The mitral valve is normal in structure. There is no evidence of mitral valve stenosis. There is normal mitral valve leaflet mobility. There is mild mitral valve regurgitation.  Tricuspid Valve: The tricuspid valve is structurally normal. There is normal tricuspid valve leaflet mobility. There is mild tricuspid regurgitation.  Pulmonic Valve: The pulmonic valve is structurally normal. There is no indication of pulmonic valve regurgitation.  Pericardium: There is no pericardial effusion noted.  Aorta: The aortic root is normal.  Pulmonary Artery: The main pulmonary artery is normal in size, and position, with normal bifurcation into the left and right pulmonary arteries.  Systemic Veins: The inferior vena cava appears mildly dilated.  In comparison to the previous echocardiogram(s): There are no prior studies on this patient for comparison purposes.        CONCLUSIONS:  1. Left ventricular systolic function is normal with a 60% estimated ejection fraction.  2. Spectral Doppler shows an impaired relaxation pattern of left ventricular diastolic filling.  3. There is moderate concentric left ventricular hypertrophy.  4. There is no evidence of mitral valve stenosis.  5. Mild tricuspid regurgitation is visualized.  6. Aortic valve stenosis is not present.  7. The main pulmonary  artery is normal in size, and position, with normal bifurcation into the left and right pulmonary arteries.     RECOMMENDATIONS:  Technically suboptimal and limited study, therefore accuracy of above interpretation could be substantially diminished. Clinical correlation is advised. Consider additional imaging modalities if clinically indicated.  Radiology:     No orders to display       Problem List:     Patient Active Problem List   Diagnosis    CAD (coronary artery disease)    Current every day smoker    Diastolic dysfunction    Erectile dysfunction    History of coronary artery stent placement    Hyperlipidemia    NSTEMI, initial episode of care (Multi)    Obesity, Class I, BMI 30-34.9    Subsequent non-ST elevation (NSTEMI) myocardial infarction (Multi)    Vitamin D deficiency    Hypertension    Encounter for annual physical exam    Depression screen    BMI 33.0-33.9,adult       Asessment:       58-year-old gentleman seen and evaluated in routine cardiac follow-up today in the office.    Meds, vitals, examination as noted.    Chart reviewed in detail discussed with the patient at length.    Impression:  Moderate coronary arteries  Normal LV function  Diastolic dysfunction  Dyslipidemia  Remote coronary stent  Essential hypertension  Mildly overweight  Plan:   Recommendation:  Maintain current meds  See me in 6 months  Plan repeat cardiac noninvasive studies in 1 year unless condition changes occur

## 2024-07-23 NOTE — PATIENT INSTRUCTIONS
Continue same medications/treatment.  Patient educated on proper medication use.  Patient educated on risk factor modification.  Please bring any lab results from other providers/physicians to your next appointment.    Please bring all medicines, vitamins, and herbal supplements with you when you come to the office.    Prescriptions will not be filled unless you are compliant with your follow up appointments or have a follow up appointment scheduled as per instruction of your physician. Refills should be requested at the time of your visit.    Follow up in 6 months    I, SEDA CHRISTOPHER RN, AM SCRIBING FOR AND IN THE PRESENCE OF DR. SHEA HERNÁNDEZ, DO, FACC

## 2024-08-25 DIAGNOSIS — I25.10 CORONARY ARTERY DISEASE INVOLVING NATIVE CORONARY ARTERY OF NATIVE HEART WITHOUT ANGINA PECTORIS: ICD-10-CM

## 2024-08-27 RX ORDER — NAPROXEN SODIUM 220 MG/1
81 TABLET, FILM COATED ORAL DAILY
Qty: 90 TABLET | Refills: 3 | Status: SHIPPED | OUTPATIENT
Start: 2024-08-27

## 2024-08-27 NOTE — TELEPHONE ENCOUNTER
Received request for prescription refills for patient.   Patient follows with Dr. Puente     Request is for Aspirin 81mg chewable daily  Is patient currently on medication yes    Last OV 7/23/24  Next OV 1/23/25    Pended for signing and sent to provider

## 2025-01-02 ENCOUNTER — HOSPITAL ENCOUNTER (EMERGENCY)
Facility: HOSPITAL | Age: 60
Discharge: HOME | End: 2025-01-02
Payer: COMMERCIAL

## 2025-01-02 ENCOUNTER — APPOINTMENT (OUTPATIENT)
Dept: RADIOLOGY | Facility: HOSPITAL | Age: 60
End: 2025-01-02
Payer: COMMERCIAL

## 2025-01-02 ENCOUNTER — APPOINTMENT (OUTPATIENT)
Dept: CARDIOLOGY | Facility: HOSPITAL | Age: 60
End: 2025-01-02
Payer: COMMERCIAL

## 2025-01-02 VITALS
SYSTOLIC BLOOD PRESSURE: 154 MMHG | HEIGHT: 69 IN | TEMPERATURE: 97.2 F | HEART RATE: 75 BPM | RESPIRATION RATE: 18 BRPM | DIASTOLIC BLOOD PRESSURE: 81 MMHG | WEIGHT: 235 LBS | BODY MASS INDEX: 34.8 KG/M2 | OXYGEN SATURATION: 97 %

## 2025-01-02 DIAGNOSIS — R10.13 EPIGASTRIC PAIN: Primary | ICD-10-CM

## 2025-01-02 DIAGNOSIS — K86.89 PANCREATIC MASS (HHS-HCC): ICD-10-CM

## 2025-01-02 LAB
ALBUMIN SERPL BCP-MCNC: 4.8 G/DL (ref 3.4–5)
ALP SERPL-CCNC: 71 U/L (ref 33–136)
ALT SERPL W P-5'-P-CCNC: 21 U/L (ref 10–52)
ANION GAP SERPL CALC-SCNC: 13 MMOL/L (ref 10–20)
AST SERPL W P-5'-P-CCNC: 17 U/L (ref 9–39)
BASOPHILS # BLD AUTO: 0.04 X10*3/UL (ref 0–0.1)
BASOPHILS NFR BLD AUTO: 0.4 %
BILIRUB SERPL-MCNC: 0.7 MG/DL (ref 0–1.2)
BUN SERPL-MCNC: 15 MG/DL (ref 6–23)
CALCIUM SERPL-MCNC: 9.7 MG/DL (ref 8.6–10.3)
CARDIAC TROPONIN I PNL SERPL HS: 5 NG/L (ref 0–20)
CHLORIDE SERPL-SCNC: 100 MMOL/L (ref 98–107)
CO2 SERPL-SCNC: 26 MMOL/L (ref 21–32)
CREAT SERPL-MCNC: 0.91 MG/DL (ref 0.5–1.3)
EGFRCR SERPLBLD CKD-EPI 2021: >90 ML/MIN/1.73M*2
EOSINOPHIL # BLD AUTO: 0.2 X10*3/UL (ref 0–0.7)
EOSINOPHIL NFR BLD AUTO: 1.8 %
ERYTHROCYTE [DISTWIDTH] IN BLOOD BY AUTOMATED COUNT: 11.8 % (ref 11.5–14.5)
GLUCOSE SERPL-MCNC: 114 MG/DL (ref 74–99)
HCT VFR BLD AUTO: 45.5 % (ref 41–52)
HGB BLD-MCNC: 16 G/DL (ref 13.5–17.5)
IMM GRANULOCYTES # BLD AUTO: 0.04 X10*3/UL (ref 0–0.7)
IMM GRANULOCYTES NFR BLD AUTO: 0.4 % (ref 0–0.9)
LACTATE SERPL-SCNC: 1.2 MMOL/L (ref 0.4–2)
LIPASE SERPL-CCNC: 77 U/L (ref 9–82)
LYMPHOCYTES # BLD AUTO: 2.57 X10*3/UL (ref 1.2–4.8)
LYMPHOCYTES NFR BLD AUTO: 23.6 %
MCH RBC QN AUTO: 31.6 PG (ref 26–34)
MCHC RBC AUTO-ENTMCNC: 35.2 G/DL (ref 32–36)
MCV RBC AUTO: 90 FL (ref 80–100)
MONOCYTES # BLD AUTO: 0.73 X10*3/UL (ref 0.1–1)
MONOCYTES NFR BLD AUTO: 6.7 %
NEUTROPHILS # BLD AUTO: 7.3 X10*3/UL (ref 1.2–7.7)
NEUTROPHILS NFR BLD AUTO: 67.1 %
NRBC BLD-RTO: 0 /100 WBCS (ref 0–0)
PLATELET # BLD AUTO: 258 X10*3/UL (ref 150–450)
POTASSIUM SERPL-SCNC: 4 MMOL/L (ref 3.5–5.3)
PROT SERPL-MCNC: 7.7 G/DL (ref 6.4–8.2)
RBC # BLD AUTO: 5.06 X10*6/UL (ref 4.5–5.9)
SODIUM SERPL-SCNC: 135 MMOL/L (ref 136–145)
WBC # BLD AUTO: 10.9 X10*3/UL (ref 4.4–11.3)

## 2025-01-02 PROCEDURE — 2550000001 HC RX 255 CONTRASTS

## 2025-01-02 PROCEDURE — 96374 THER/PROPH/DIAG INJ IV PUSH: CPT | Mod: 59

## 2025-01-02 PROCEDURE — 84484 ASSAY OF TROPONIN QUANT: CPT

## 2025-01-02 PROCEDURE — 2500000004 HC RX 250 GENERAL PHARMACY W/ HCPCS (ALT 636 FOR OP/ED)

## 2025-01-02 PROCEDURE — 80053 COMPREHEN METABOLIC PANEL: CPT

## 2025-01-02 PROCEDURE — 85025 COMPLETE CBC W/AUTO DIFF WBC: CPT

## 2025-01-02 PROCEDURE — 83690 ASSAY OF LIPASE: CPT

## 2025-01-02 PROCEDURE — 74177 CT ABD & PELVIS W/CONTRAST: CPT | Performed by: RADIOLOGY

## 2025-01-02 PROCEDURE — 2500000001 HC RX 250 WO HCPCS SELF ADMINISTERED DRUGS (ALT 637 FOR MEDICARE OP)

## 2025-01-02 PROCEDURE — 36415 COLL VENOUS BLD VENIPUNCTURE: CPT

## 2025-01-02 PROCEDURE — 74177 CT ABD & PELVIS W/CONTRAST: CPT

## 2025-01-02 PROCEDURE — 99285 EMERGENCY DEPT VISIT HI MDM: CPT | Mod: 25

## 2025-01-02 PROCEDURE — 83605 ASSAY OF LACTIC ACID: CPT

## 2025-01-02 PROCEDURE — 93005 ELECTROCARDIOGRAM TRACING: CPT

## 2025-01-02 RX ORDER — NAPROXEN SODIUM 550 MG/1
550 TABLET ORAL
Qty: 14 TABLET | Refills: 0 | Status: SHIPPED | OUTPATIENT
Start: 2025-01-02 | End: 2025-01-09

## 2025-01-02 RX ORDER — HYDROCODONE BITARTRATE AND ACETAMINOPHEN 5; 325 MG/1; MG/1
1 TABLET ORAL EVERY 6 HOURS PRN
Qty: 12 TABLET | Refills: 0 | Status: SHIPPED | OUTPATIENT
Start: 2025-01-02 | End: 2025-01-05

## 2025-01-02 RX ORDER — ONDANSETRON 4 MG/1
4 TABLET, ORALLY DISINTEGRATING ORAL EVERY 8 HOURS PRN
Qty: 20 TABLET | Refills: 0 | Status: SHIPPED | OUTPATIENT
Start: 2025-01-02 | End: 2025-01-09

## 2025-01-02 RX ORDER — HYDROCODONE BITARTRATE AND ACETAMINOPHEN 5; 325 MG/1; MG/1
1 TABLET ORAL ONCE
Status: COMPLETED | OUTPATIENT
Start: 2025-01-02 | End: 2025-01-02

## 2025-01-02 RX ORDER — FAMOTIDINE 20 MG/1
20 TABLET, FILM COATED ORAL 2 TIMES DAILY
Qty: 14 TABLET | Refills: 0 | Status: SHIPPED | OUTPATIENT
Start: 2025-01-02 | End: 2025-01-09

## 2025-01-02 RX ORDER — FAMOTIDINE 10 MG/ML
20 INJECTION INTRAVENOUS ONCE
Status: COMPLETED | OUTPATIENT
Start: 2025-01-02 | End: 2025-01-02

## 2025-01-02 RX ADMIN — FAMOTIDINE 20 MG: 10 INJECTION, SOLUTION INTRAVENOUS at 21:15

## 2025-01-02 RX ADMIN — HYDROCODONE BITARTRATE AND ACETAMINOPHEN 1 TABLET: 5; 325 TABLET ORAL at 23:39

## 2025-01-02 RX ADMIN — IOHEXOL 75 ML: 350 INJECTION, SOLUTION INTRAVENOUS at 21:51

## 2025-01-02 ASSESSMENT — LIFESTYLE VARIABLES
EVER FELT BAD OR GUILTY ABOUT YOUR DRINKING: NO
EVER HAD A DRINK FIRST THING IN THE MORNING TO STEADY YOUR NERVES TO GET RID OF A HANGOVER: NO
HAVE YOU EVER FELT YOU SHOULD CUT DOWN ON YOUR DRINKING: NO
HAVE PEOPLE ANNOYED YOU BY CRITICIZING YOUR DRINKING: NO
TOTAL SCORE: 0

## 2025-01-02 ASSESSMENT — COLUMBIA-SUICIDE SEVERITY RATING SCALE - C-SSRS
6. HAVE YOU EVER DONE ANYTHING, STARTED TO DO ANYTHING, OR PREPARED TO DO ANYTHING TO END YOUR LIFE?: NO
2. HAVE YOU ACTUALLY HAD ANY THOUGHTS OF KILLING YOURSELF?: NO
1. IN THE PAST MONTH, HAVE YOU WISHED YOU WERE DEAD OR WISHED YOU COULD GO TO SLEEP AND NOT WAKE UP?: NO

## 2025-01-02 ASSESSMENT — PAIN - FUNCTIONAL ASSESSMENT
PAIN_FUNCTIONAL_ASSESSMENT: 0-10
PAIN_FUNCTIONAL_ASSESSMENT: 0-10

## 2025-01-02 ASSESSMENT — PAIN SCALES - GENERAL: PAINLEVEL_OUTOF10: 5 - MODERATE PAIN

## 2025-01-02 NOTE — ED PROVIDER NOTES
"HPI   Chief Complaint   Patient presents with    Abdominal Pain     Worsening since thanksgiving         History provided by:  Patient    Limitations to history:  none    CC: abdominal pain    HPI: 59-year-old male with a history of hypertension, hyperlipidemia, CAD presents the emergency department to be evaluated for abdominal pain.  Patient has had this abdominal pain intermittently since Thanksgiving.  He characterized the pain as \"aching \"and localized to the epigastric region.  He states the pain will occur intermittently nothing particular makes it better or worse.  He has not had pain like this before.  Denies history of abdominal surgeries.  Denies chest pain and shortness of breath.  Denies history of CHF, PE or DVT, asthma.  Denies nausea vomiting, diarrhea constipation, blood in the urine or stool.  Denies any urgency, frequency, dysuria.  Denies lower abdominal pain.  Denies fever and chills.  Denies any flulike symptoms.  Denies tearing or shooting pain in his chest or back.  Denies all other systemic symptoms.    ROS: Negative unless mentioned in HPI    Medical Hx:    Denies allergies.  Immunizations are up-to-date.      Physical exam:    Constitutional: Patient is well-nourished and well-developed.  Sitting comfortably in the room and in no distress.  Oriented to person, place, time, and situation.    HEENT: Head is normocephalic, atraumatic. Patient's airway is patent.  Tympanic membranes are clear bilaterally.  Nasal mucosa clear.  Mouth with normal mucosa.  Throat is not erythematous and there are no oropharyngeal exudates, uvula is midline.  No obvious facial deformities.    Eyes: Clear bilaterally.  Pupils are equal round and reactive to light and accommodation.  Extraocular movements intact.      Cardiac: Regular rate, regular rhythm.  Heart sounds S1, S2.  No murmurs, rubs, or gallops.  PMI nondisplaced.  No JVD.    Respiratory: Regular respiratory rate and effort.  Breath sounds are clear and " equal bilaterally, no adventitious lung sounds.  Patient is speaking in full sentences and is in no apparent respiratory distress. No use of accessory muscles.      Gastrointestinal:   Epigastric abdominal tenderness to palpation.  Abdomen is soft and nondistended. There are no obvious deformities.  No rebound tenderness or guarding.  Bowel sounds are normal active.    Genitourinary: No CVA or flank tenderness.    Musculoskeletal: No reproducible tenderness.  No obvious skin or bony deformities.  Patient has equal range of motion in all extremities and no strength deficiencies.  No muscle or joint tenderness. No back or neck tenderness.  Capillary refill less than 3 seconds.  Strong peripheral pulses.  No sensory deficits.    Neurological: Patient is alert and oriented.  No focal deficits.  5/5 strength in all extremities.  Cranial nerves II through XII intact. GCS15.     Skin: Skin is normal color for race and is warm, dry, and intact.  No evidence of trauma.  No lesions, rashes, bruising, jaundice, or masses.    Psych: Appropriate mood and affect.  No apparent risk to self or others.    Heme/lymph: No adenopathy, lymphadenopathy, or splenomegaly    Physical exam is otherwise negative unless stated above or in history of present illness.              Patient History   Past Medical History:   Diagnosis Date    Hyperlipidemia     RCA occlusion (Multi) 06/22/2023     Past Surgical History:   Procedure Laterality Date    OTHER SURGICAL HISTORY  04/16/2021    No history of surgery     Family History   Problem Relation Name Age of Onset    Diabetes Mother      Diabetes Father      Heart attack Father       Social History     Tobacco Use    Smoking status: Every Day     Current packs/day: 0.25     Average packs/day: 0.3 packs/day for 43.0 years (10.8 ttl pk-yrs)     Types: Cigarettes     Start date: 1982    Smokeless tobacco: Never   Substance Use Topics    Alcohol use: Yes     Alcohol/week: 6.0 - 8.0 standard drinks of  alcohol     Types: 6 - 8 Cans of beer per week    Drug use: Never       Physical Exam   ED Triage Vitals [01/02/25 1809]   Temperature Heart Rate Respirations BP   36.2 °C (97.2 °F) 84 18 (!) 183/90      Pulse Ox Temp src Heart Rate Source Patient Position   98 % -- -- --      BP Location FiO2 (%)     -- --       Physical Exam      ED Course & MDM   Diagnoses as of 01/02/25 8116   Epigastric pain   Pancreatic mass ([facility])       Patient updated on plan for lab testing, IV insertion, radiology imaging, and medications to be administered while in the ER (if indicated). Patient updated on expected wait times for testing and results. Patient provided my name and told to ask any staff member for questions or concerns if they should arise. Electronic medical record reviewed.     [facility]    Upon initial assessment, patient was healthy non-toxic appearing and in no apparent distress.     Patient presented to the emergency department with the chief complaint abdominal pain.  Epigastric abdominal/palpation.  Abdomen is soft and nondistended.On arrival to the emergency department, vital signs were within normal limits      Based on the patient's history and physical exam of a low suspicion for dissection.  Will obtain basic blood work, EKG and troponin given his history, lactate and lipase, CT abdomen and pelvis.  Give the patient Pepcid for his discomfort.      EKG was performed at 2046 interpreted by me.  Normal sinus rhythm 72 beats minute.  Normal axis.  No ST elevation or depression.  No prolonged QT.  Diffuse T wave inversion in lead  3, aVF, V3 through V6.      The inversions in the anterior leads appear to be new but III and aVF appear to be chronic.     patient's troponin is 5 making ACS unlikely.  Lactate is 1.2.  Lipase is 77.  CBC reveals no leukocytosis or anemia.  CMP reveals hyponatremia 135.      CT shows a mass involving the uncinate process of the pancreas highly concerning for malignancy      I discussed  differentials with the patient and my concern for pancreatic malignancy.       I discussed disposition with the patient and he feels well and prefer to go home at this time.  I will have registration set him up with an appointment to follow-up with outpatient GI before he is discharged.  He will be discharged on Pepcid, Zofran, Anaprox, and a few doses of Norco for breakthrough discomfort.  I reviewed his OARRS report and I do believe this is reasonable at this time given his CT findings.  He will follow-up with his primary care provider.  I discussed supportive treatment.  All questions and concerns addressed.  Reasons to return to ER discussed.  Patient verbalized understanding and agreement with the treatment plan and they remained hemodynamically stable in the ER.    This note was dictated using a speech recognition program.  While an attempt was made at proof-reading to minimize errors, minor errors in transcription may be present            No data recorded     [place] Coma Scale Score: 15 (01/02/25 1810 : Alexus Mantilla RN)                           Medical Decision Making      Procedure  Procedures     Cesario Emery PA-C  01/02/25 6456

## 2025-01-03 LAB
ATRIAL RATE: 72 BPM
P AXIS: 40 DEGREES
P OFFSET: 204 MS
P ONSET: 159 MS
PR INTERVAL: 126 MS
Q ONSET: 222 MS
QRS COUNT: 12 BEATS
QRS DURATION: 88 MS
QT INTERVAL: 398 MS
QTC CALCULATION(BAZETT): 435 MS
QTC FREDERICIA: 423 MS
R AXIS: -1 DEGREES
T AXIS: -12 DEGREES
T OFFSET: 421 MS
VENTRICULAR RATE: 72 BPM

## 2025-01-21 NOTE — PROGRESS NOTES
Subjective     HPI  Sathish Iqbal is a 59 y.o. male who is referred for pancreatic head mass.  He notes that he has had epigastric pain since about Thanksgiving 2024.  Ultimately he presented to the emergency department on January 2, 2025.  He underwent workup with CT scan at that time which shows 2.7 x 3.4 cm pancreatic head mass which on my review is highly concerning for pancreatic adenocarcinoma.  There is involvement of the SMV with narrowing as it branches without occlusion.  No evidence of intra-abdominal metastatic disease.  No evidence of jaundice.    Past medical history significant for hypertension, hyperlipidemia, coronary artery disease with NSTEMI 6/2023 (catheterization without no intervention). On aspirin and plavix.    No abdominal surgery    Review of Systems   Constitutional:  Positive for unexpected weight change (10-15 lbs). Negative for fatigue.   HENT:  Negative for ear discharge, nosebleeds and sore throat.    Eyes:  Negative for discharge.   Respiratory:  Negative for shortness of breath.    Cardiovascular:  Negative for chest pain.   Gastrointestinal:  Positive for abdominal pain and constipation. Negative for nausea and vomiting.   Endocrine: Negative.    Genitourinary:  Negative for dysuria and flank pain.   Musculoskeletal:  Negative for gait problem.   Skin:  Negative for color change and rash.   Neurological:  Negative for speech difficulty and headaches.   Hematological:  Negative for adenopathy.   Psychiatric/Behavioral:  Negative for behavioral problems, confusion and hallucinations.         Past Medical History:   Diagnosis Date    Hyperlipidemia     RCA occlusion (Multi) 06/22/2023      Past Surgical History:   Procedure Laterality Date    OTHER SURGICAL HISTORY  04/16/2021    No history of surgery      Current Outpatient Medications on File Prior to Visit   Medication Sig Dispense Refill    aspirin 81 mg chewable tablet CHEW AND SWALLOW ONE TABLET BY MOUTH DAILY 90 tablet 3     atorvastatin (Lipitor) 40 mg tablet Take 1 tablet (40 mg) by mouth once daily at bedtime. 90 tablet 3    clopidogrel (Plavix) 75 mg tablet Take 1 tablet (75 mg) by mouth once daily. 90 tablet 3    famotidine (Pepcid) 20 mg tablet Take 1 tablet (20 mg) by mouth 2 times a day for 7 days. 14 tablet 0    losartan (Cozaar) 50 mg tablet Take 1 tablet (50 mg) by mouth once daily. 90 tablet 3    metoprolol succinate XL (Toprol-XL) 50 mg 24 hr tablet Take 1 tablet (50 mg) by mouth once daily. 90 tablet 3    nitroglycerin (Nitrostat) 0.4 mg SL tablet Place 1 tablet (0.4 mg) under the tongue every 5 minutes if needed for chest pain. 25 tablet 5    sildenafil (Viagra) 50 mg tablet Take 1 tablet (50 mg) by mouth if needed.       No current facility-administered medications on file prior to visit.      No Known Allergies   Social History     Socioeconomic History    Marital status: Single     Spouse name: Not on file    Number of children: Not on file    Years of education: Not on file    Highest education level: Not on file   Occupational History    Not on file   Tobacco Use    Smoking status: Every Day     Current packs/day: 0.25     Average packs/day: 0.2 packs/day for 43.1 years (10.8 ttl pk-yrs)     Types: Cigarettes     Start date: 1982    Smokeless tobacco: Never   Substance and Sexual Activity    Alcohol use: Yes     Alcohol/week: 6.0 - 8.0 standard drinks of alcohol     Types: 6 - 8 Cans of beer per week    Drug use: Never    Sexual activity: Defer   Other Topics Concern    Not on file   Social History Narrative    Not on file     Social Drivers of Health     Financial Resource Strain: Not on file   Food Insecurity: Not on file   Transportation Needs: Not on file   Physical Activity: Not on file   Stress: Not on file   Social Connections: Not on file   Intimate Partner Violence: Not on file   Housing Stability: Not on file      Family History   Problem Relation Name Age of Onset    Diabetes Mother      Diabetes Father    "   Heart attack Father            Objective     Vitals:    01/23/25 0906   BP: 135/80   Pulse: 72   Resp: 16   Temp: 36 °C (96.8 °F)   SpO2: 97%          Physical Exam  Constitutional:       Appearance: Normal appearance.   HENT:      Head: Atraumatic.      Nose: Nose normal.   Eyes:      Extraocular Movements: Extraocular movements intact.      Conjunctiva/sclera: Conjunctivae normal.   Cardiovascular:      Rate and Rhythm: Normal rate.   Pulmonary:      Effort: Pulmonary effort is normal.   Abdominal:      Palpations: Abdomen is soft.      Tenderness: There is no abdominal tenderness.   Musculoskeletal:         General: Normal range of motion.   Skin:     Findings: No rash.   Neurological:      General: No focal deficit present.      Mental Status: He is alert.   Psychiatric:         Behavior: Behavior normal.        Lab Results   Component Value Date    WBC 10.9 01/02/2025    HGB 16.0 01/02/2025    HCT 45.5 01/02/2025     01/02/2025     Lab Results   Component Value Date     (L) 01/02/2025    K 4.0 01/02/2025     01/02/2025    CO2 26 01/02/2025    BUN 15 01/02/2025    CREATININE 0.91 01/02/2025    EGFR >90 01/02/2025    CALCIUM 9.7 01/02/2025    ALBUMIN 4.8 01/02/2025    PROT 7.7 01/02/2025    AST 17 01/02/2025    ALT 21 01/02/2025    BILITOT 0.7 01/02/2025     No results found for: \"CEA\", \"\", \"AFP\"       ECG 12 lead    Result Date: 1/12/2025  Normal sinus rhythm Minimal voltage criteria for LVH, may be normal variant Nonspecific T wave abnormality Abnormal ECG When compared with ECG of 17-JUL-2023 10:44, Nonspecific T wave abnormality now evident in Anterior leads See ED provider note for full interpretation and clinical correlation Confirmed by Erinn James ([id number]) on 1/12/2025 10:36:42 AM    CT abdomen pelvis w IV contrast    Result Date: 1/2/2025  Interpreted By:  Jose Barnett, STUDY: CT ABDOMEN PELVIS W IV CONTRAST;  1/2/2025 9:56 pm   INDICATION: Signs/Symptoms:epigastric pain.   " COMPARISON: None.   ACCESSION NUMBER(S): [id number]   ORDERING CLINICIAN: CHARITY MERCADO   TECHNIQUE: CT of the abdomen and pelvis was performed.  Standard contiguous axial images were obtained at 3 mm slice thickness through the abdomen and pelvis. Coronal and sagittal reconstructions at 3 mm slice thickness were performed.   75  ml of contrast Omnipaque 350 were administered intravenously without immediate complication.   FINDINGS: LOWER CHEST: The visualized bases are clear bilaterally. No sizable hiatal hernia is seen.   ABDOMEN:   LIVER: Liver is normal in size. No focal lesions are seen.   BILE DUCTS: Biliary system is nondilated.   GALLBLADDER: Within normal limits as distended. No radiodense calculi. No pericholecystic free fluid.   PANCREAS: There is a 2.7 x 3.4 cm low-attenuation area involving uncinate process of the pancreas concerning for neoplastic process such as adeno carcinoma. No significant pancreatic duct dilatation is seen. The mass lesion abuts the branches of the SMV. No significant peripancreatic fat stranding is seen.   SPLEEN: The spleen is normal in size. No focal abnormalities are seen.   ADRENAL GLANDS: The adrenal glands bilaterally within normal limits.   KIDNEYS AND URETERS: No hydronephrosis or renal masses. No perinephric stranding. No radiodense renal calculi.   PELVIS:   BLADDER: The urinary bladder is decompressed. No bladder calculi or masses are seen.   REPRODUCTIVE ORGANS: Diffuse calcification of prostate. No pelvic free fluid, masses or lymphadenopathy   BOWEL: The small and large bowel are nondilated.  The appendix is normal .   VESSELS: Diffuse wall calcification of the aorta and its main branches. No aneurysm or dissection.   PERITONEUM/RETROPERITONEUM/LYMPH NODES: No retroperitoneal masses are seen.  No lymphadenopathy.   BONES AND ABDOMINAL WALL: Multilevel discogenic degenerative changes throughout the spine. No destructive bone lesions.       1. Ill-defined  low-attenuation mass involving the uncinate process of the pancreas highly concerning for neoplastic process such as adeno carcinoma of the pancreas. Further evaluation with MRI is suggested for better characterization. 2. Additional detailed nonacute findings as above.   Critical Finding:  See findings. Notification was initiated on 1/2/2025 at 10:29 pm by  Jose Barnett.  (**-YCF-**) Instructions:   Signed by: Jose Barnett 1/2/2025 10:29 PM Dictation workstation:   [id number]      Assessment/Plan     59-year-old man with imaging findings highly suspicious for pancreatic adenocarcinoma with SMV involvement.  I will get CT chest to complete his staging, CA 19-9, and order endoscopic ultrasound with biopsy to confirm diagnosis.  I discussed neoadjuvant chemotherapy as the next step and given his venous involvement, we will consider neoadjuvant radiation as well.  We discussed that surgery will be reassessed based on his response to treatment, and that I would engage my surgical colleagues to help with venous reconstruction given the current findings.  Referral to medical oncology has been placed.    Drew Castro MD

## 2025-01-23 ENCOUNTER — APPOINTMENT (OUTPATIENT)
Dept: CARDIOLOGY | Facility: CLINIC | Age: 60
End: 2025-01-23
Payer: COMMERCIAL

## 2025-01-23 ENCOUNTER — OFFICE VISIT (OUTPATIENT)
Dept: SURGICAL ONCOLOGY | Facility: CLINIC | Age: 60
End: 2025-01-23
Payer: COMMERCIAL

## 2025-01-23 ENCOUNTER — LAB (OUTPATIENT)
Dept: LAB | Facility: CLINIC | Age: 60
End: 2025-01-23
Payer: COMMERCIAL

## 2025-01-23 ENCOUNTER — TELEPHONE (OUTPATIENT)
Dept: CARDIOLOGY | Facility: CLINIC | Age: 60
End: 2025-01-23

## 2025-01-23 VITALS
SYSTOLIC BLOOD PRESSURE: 138 MMHG | HEIGHT: 69 IN | WEIGHT: 215.8 LBS | DIASTOLIC BLOOD PRESSURE: 80 MMHG | HEART RATE: 60 BPM | BODY MASS INDEX: 31.96 KG/M2

## 2025-01-23 VITALS
TEMPERATURE: 96.8 F | DIASTOLIC BLOOD PRESSURE: 80 MMHG | SYSTOLIC BLOOD PRESSURE: 135 MMHG | WEIGHT: 215.61 LBS | BODY MASS INDEX: 31.84 KG/M2 | OXYGEN SATURATION: 97 % | RESPIRATION RATE: 16 BRPM | HEART RATE: 72 BPM

## 2025-01-23 DIAGNOSIS — E78.2 MIXED HYPERLIPIDEMIA: ICD-10-CM

## 2025-01-23 DIAGNOSIS — I10 PRIMARY HYPERTENSION: ICD-10-CM

## 2025-01-23 DIAGNOSIS — F17.200 CURRENT EVERY DAY SMOKER: ICD-10-CM

## 2025-01-23 DIAGNOSIS — I25.10 CORONARY ARTERY DISEASE INVOLVING NATIVE CORONARY ARTERY OF NATIVE HEART WITHOUT ANGINA PECTORIS: ICD-10-CM

## 2025-01-23 DIAGNOSIS — Z95.5 HISTORY OF CORONARY ARTERY STENT PLACEMENT: ICD-10-CM

## 2025-01-23 DIAGNOSIS — K86.89 MASS OF HEAD OF PANCREAS (HHS-HCC): ICD-10-CM

## 2025-01-23 DIAGNOSIS — I21.4 NSTEMI, INITIAL EPISODE OF CARE (MULTI): ICD-10-CM

## 2025-01-23 LAB
ALBUMIN SERPL BCP-MCNC: 4.9 G/DL (ref 3.4–5)
ALP SERPL-CCNC: 77 U/L (ref 33–136)
ALT SERPL W P-5'-P-CCNC: 20 U/L (ref 10–52)
ANION GAP SERPL CALC-SCNC: 12 MMOL/L (ref 10–20)
APTT PPP: 34 SECONDS (ref 27–38)
AST SERPL W P-5'-P-CCNC: 16 U/L (ref 9–39)
BILIRUB SERPL-MCNC: 0.9 MG/DL (ref 0–1.2)
BUN SERPL-MCNC: 12 MG/DL (ref 6–23)
CALCIUM SERPL-MCNC: 10 MG/DL (ref 8.6–10.3)
CHLORIDE SERPL-SCNC: 101 MMOL/L (ref 98–107)
CO2 SERPL-SCNC: 28 MMOL/L (ref 21–32)
CREAT SERPL-MCNC: 0.8 MG/DL (ref 0.5–1.3)
EGFRCR SERPLBLD CKD-EPI 2021: >90 ML/MIN/1.73M*2
ERYTHROCYTE [DISTWIDTH] IN BLOOD BY AUTOMATED COUNT: 11.7 % (ref 11.5–14.5)
GLUCOSE SERPL-MCNC: 108 MG/DL (ref 74–99)
HCT VFR BLD AUTO: 46.7 % (ref 41–52)
HGB BLD-MCNC: 15.9 G/DL (ref 13.5–17.5)
INR PPP: 1.1 (ref 0.9–1.1)
MCH RBC QN AUTO: 31 PG (ref 26–34)
MCHC RBC AUTO-ENTMCNC: 34 G/DL (ref 32–36)
MCV RBC AUTO: 91 FL (ref 80–100)
PLATELET # BLD AUTO: 257 X10*3/UL (ref 150–450)
POTASSIUM SERPL-SCNC: 4.1 MMOL/L (ref 3.5–5.3)
PROT SERPL-MCNC: 7.8 G/DL (ref 6.4–8.2)
PROTHROMBIN TIME: 12.3 SECONDS (ref 9.8–12.8)
RBC # BLD AUTO: 5.13 X10*6/UL (ref 4.5–5.9)
SODIUM SERPL-SCNC: 137 MMOL/L (ref 136–145)
WBC # BLD AUTO: 9.2 X10*3/UL (ref 4.4–11.3)

## 2025-01-23 PROCEDURE — 99205 OFFICE O/P NEW HI 60 MIN: CPT | Performed by: SURGERY

## 2025-01-23 PROCEDURE — 85027 COMPLETE CBC AUTOMATED: CPT

## 2025-01-23 PROCEDURE — 3075F SYST BP GE 130 - 139MM HG: CPT | Performed by: INTERNAL MEDICINE

## 2025-01-23 PROCEDURE — 3075F SYST BP GE 130 - 139MM HG: CPT | Performed by: SURGERY

## 2025-01-23 PROCEDURE — 36415 COLL VENOUS BLD VENIPUNCTURE: CPT

## 2025-01-23 PROCEDURE — 3008F BODY MASS INDEX DOCD: CPT | Performed by: INTERNAL MEDICINE

## 2025-01-23 PROCEDURE — 86301 IMMUNOASSAY TUMOR CA 19-9: CPT

## 2025-01-23 PROCEDURE — 3079F DIAST BP 80-89 MM HG: CPT | Performed by: SURGERY

## 2025-01-23 PROCEDURE — 99215 OFFICE O/P EST HI 40 MIN: CPT | Mod: 25 | Performed by: SURGERY

## 2025-01-23 PROCEDURE — 85610 PROTHROMBIN TIME: CPT

## 2025-01-23 PROCEDURE — 3079F DIAST BP 80-89 MM HG: CPT | Performed by: INTERNAL MEDICINE

## 2025-01-23 PROCEDURE — 99214 OFFICE O/P EST MOD 30 MIN: CPT | Performed by: INTERNAL MEDICINE

## 2025-01-23 PROCEDURE — 84075 ASSAY ALKALINE PHOSPHATASE: CPT

## 2025-01-23 RX ORDER — NITROGLYCERIN 0.4 MG/1
0.4 TABLET SUBLINGUAL EVERY 5 MIN PRN
Qty: 25 TABLET | Refills: 5 | Status: SHIPPED | OUTPATIENT
Start: 2025-01-23

## 2025-01-23 ASSESSMENT — ENCOUNTER SYMPTOMS
HEADACHES: 0
DYSURIA: 0
ABDOMINAL PAIN: 1
ADENOPATHY: 0
EYE DISCHARGE: 0
CONFUSION: 0
HALLUCINATIONS: 0
SHORTNESS OF BREATH: 0
ENDOCRINE NEGATIVE: 1
CONSTIPATION: 1
FATIGUE: 0
COLOR CHANGE: 0
UNEXPECTED WEIGHT CHANGE: 1
FLANK PAIN: 0
NAUSEA: 0
SORE THROAT: 0
SPEECH DIFFICULTY: 0
VOMITING: 0

## 2025-01-23 ASSESSMENT — PAIN SCALES - GENERAL: PAINLEVEL_OUTOF10: 6

## 2025-01-23 NOTE — H&P (VIEW-ONLY)
Patient:  Sathish Iqbal  YOB: 1965  MRN: [id number]       Chief Complaint/Active Symptoms:       Sathish Iqbal is a 59 y.o. male who returns today for cardiac follow-up.  Patient is doing well from a cardiac standpoint.  Has coronary disease and prior stent.  Has hyperlipidemia and remote non-STEMI.  Blood pressure is controlled.  Unfortunately he recently had some abdominal pain and went to the emergency room.  He was found to have a small pancreatic mass for which now he is seen surgical oncologist who thinks this could be a cancerous mass.  He is madeline be undergoing an endoscopic biopsy procedure in a week or 2.  He has been told to stop taking his Plavix for the time being be.  Cardiac wise he is stable free of any problems that should be suitable to proceed without any significant risk.  He can resume his antiplatelet drugs when okay with his GI and surgical team.  He is madeline check to see if they also want to stop the aspirin.  From my perspective to see how he is progressing we will see him back in 3 months.  No planned cardiac studies at this point.      Objective:     Vitals:    01/23/25 1242   BP: 138/80   Pulse: 60       Vitals:    01/23/25 1242   Weight: 97.9 kg (215 lb 12.8 oz)       Allergies:     No Known Allergies       Medications:     Current Outpatient Medications   Medication Instructions    aspirin 81 mg, oral, Daily    atorvastatin (LIPITOR) 40 mg, oral, Nightly    clopidogrel (PLAVIX) 75 mg, oral, Daily    famotidine (PEPCID) 20 mg, oral, 2 times daily    losartan (COZAAR) 50 mg, oral, Daily    metoprolol succinate XL (TOPROL-XL) 50 mg, oral, Daily    nitroglycerin (NITROSTAT) 0.4 mg, sublingual, Every 5 min PRN    sildenafil (VIAGRA) 50 mg, As needed       Physical Examination:   Constitutional:       Appearance: Healthy appearance. Not in distress.   Neck:      Vascular: No JVR. JVD normal.   Pulmonary:      Effort: Pulmonary effort is normal.      Breath sounds: Normal  "breath sounds. No wheezing. No rhonchi. No rales.   Chest:      Chest wall: Not tender to palpatation.   Cardiovascular:      PMI at left midclavicular line. Normal rate. Regular rhythm. Normal S1. Normal S2.       Murmurs: There is no murmur.      No gallop.  No click. No rub.   Pulses:     Intact distal pulses.   Edema:     Peripheral edema absent.   Abdominal:      General: Bowel sounds are normal.      Palpations: Abdomen is soft.      Tenderness: There is no abdominal tenderness.   Musculoskeletal: Normal range of motion.         General: No tenderness. Skin:     General: Skin is warm and dry.   Neurological:      General: No focal deficit present.      Mental Status: Alert and oriented to person, place and time.            Lab:     CBC:   Lab Results   Component Value Date    WBC 9.2 01/23/2025    RBC 5.13 01/23/2025    HGB 15.9 01/23/2025    HCT 46.7 01/23/2025     01/23/2025        CMP:    Lab Results   Component Value Date     01/23/2025    K 4.1 01/23/2025     01/23/2025    CO2 28 01/23/2025    BUN 12 01/23/2025    CREATININE 0.80 01/23/2025    GLUCOSE 108 (H) 01/23/2025    CALCIUM 10.0 01/23/2025       Magnesium:    Lab Results   Component Value Date    MG 2.30 07/17/2023       Lipid Profile:    Lab Results   Component Value Date    TRIG 203 (H) 06/03/2023    TRIG CANCELED 06/03/2023    HDL 39.3 (A) 06/03/2023    HDL CANCELED 06/03/2023       TSH:    Lab Results   Component Value Date    TSH 1.63 04/24/2021       BNP:   Lab Results   Component Value Date     (H) 07/17/2023        PT/INR:    Lab Results   Component Value Date    PROTIME 11.3 07/17/2023    INR 1.0 07/17/2023       HgBA1c:    No results found for: \"HGBA1C\"    BMP:  Lab Results   Component Value Date     01/23/2025     (L) 01/02/2025     07/17/2023     06/02/2023     06/02/2023    K 4.1 01/23/2025    K 4.0 01/02/2025    K 4.4 07/17/2023    K 4.4 06/02/2023    K 4.1 06/02/2023     " 01/23/2025     01/02/2025     07/17/2023     06/02/2023     06/02/2023    CO2 28 01/23/2025    CO2 26 01/02/2025    CO2 24 07/17/2023    CO2 27 06/02/2023    CO2 26 06/02/2023    BUN 12 01/23/2025    BUN 15 01/02/2025    BUN 13 07/17/2023    BUN 14 06/02/2023    BUN 15 06/02/2023    CREATININE 0.80 01/23/2025    CREATININE 0.91 01/02/2025    CREATININE 0.96 07/17/2023    CREATININE 0.89 06/02/2023    CREATININE 1.01 06/02/2023       CBC:  Lab Results   Component Value Date    WBC 9.2 01/23/2025    WBC 10.9 01/02/2025    WBC 8.9 07/17/2023    WBC 13.5 (H) 06/02/2023    WBC 10.6 06/02/2023    RBC 5.13 01/23/2025    RBC 5.06 01/02/2025    RBC 4.51 07/17/2023    RBC 4.85 06/02/2023    RBC 5.16 06/02/2023    HGB 15.9 01/23/2025    HGB 16.0 01/02/2025    HGB 14.1 07/17/2023    HGB 15.1 06/02/2023    HGB 15.9 06/02/2023    HCT 46.7 01/23/2025    HCT 45.5 01/02/2025    HCT 40.8 (L) 07/17/2023    HCT 44.0 06/02/2023    HCT 46.8 06/02/2023    MCV 91 01/23/2025    MCV 90 01/02/2025    MCV 90 07/17/2023    MCV 91 06/02/2023    MCV 91 06/02/2023    MCH 31.0 01/23/2025    MCH 31.6 01/02/2025    MCHC 34.0 01/23/2025    MCHC 35.2 01/02/2025    MCHC 34.6 07/17/2023    MCHC 34.3 06/02/2023    MCHC 34.0 06/02/2023    RDW 11.7 01/23/2025    RDW 11.8 01/02/2025    RDW 12.3 07/17/2023    RDW 12.2 06/02/2023    RDW 12.0 06/02/2023     01/23/2025     01/02/2025     07/17/2023     06/02/2023     06/02/2023       Cardiac Enzymes:    Lab Results   Component Value Date    TROPHS 5 01/02/2025    TROPHS CANCELED 07/18/2023    TROPHS 6 07/17/2023       Hepatic Function Panel:    Lab Results   Component Value Date    ALKPHOS 77 01/23/2025    ALT 20 01/23/2025    AST 16 01/23/2025    PROT 7.8 01/23/2025    BILITOT 0.9 01/23/2025         Diagnostic Studies:     ECG 12 lead    Result Date: 1/12/2025  Normal sinus rhythm Minimal voltage criteria for LVH, may be normal variant Nonspecific T  wave abnormality Abnormal ECG When compared with ECG of 17-JUL-2023 10:44, Nonspecific T wave abnormality now evident in Anterior leads See ED provider note for full interpretation and clinical correlation Confirmed by Erinn James ([id number]) on 1/12/2025 10:36:42 AM    CT abdomen pelvis w IV contrast    Result Date: 1/2/2025  Interpreted By:  Jose Barnett, STUDY: CT ABDOMEN PELVIS W IV CONTRAST;  1/2/2025 9:56 pm   INDICATION: Signs/Symptoms:epigastric pain.   COMPARISON: None.   ACCESSION NUMBER(S): [id number]   ORDERING CLINICIAN: CHARITY MERCADO   TECHNIQUE: CT of the abdomen and pelvis was performed.  Standard contiguous axial images were obtained at 3 mm slice thickness through the abdomen and pelvis. Coronal and sagittal reconstructions at 3 mm slice thickness were performed.   75  ml of contrast Omnipaque 350 were administered intravenously without immediate complication.   FINDINGS: LOWER CHEST: The visualized bases are clear bilaterally. No sizable hiatal hernia is seen.   ABDOMEN:   LIVER: Liver is normal in size. No focal lesions are seen.   BILE DUCTS: Biliary system is nondilated.   GALLBLADDER: Within normal limits as distended. No radiodense calculi. No pericholecystic free fluid.   PANCREAS: There is a 2.7 x 3.4 cm low-attenuation area involving uncinate process of the pancreas concerning for neoplastic process such as adeno carcinoma. No significant pancreatic duct dilatation is seen. The mass lesion abuts the branches of the SMV. No significant peripancreatic fat stranding is seen.   SPLEEN: The spleen is normal in size. No focal abnormalities are seen.   ADRENAL GLANDS: The adrenal glands bilaterally within normal limits.   KIDNEYS AND URETERS: No hydronephrosis or renal masses. No perinephric stranding. No radiodense renal calculi.   PELVIS:   BLADDER: The urinary bladder is decompressed. No bladder calculi or masses are seen.   REPRODUCTIVE ORGANS: Diffuse calcification of prostate. No pelvic free  "fluid, masses or lymphadenopathy   BOWEL: The small and large bowel are nondilated.  The appendix is normal .   VESSELS: Diffuse wall calcification of the aorta and its main branches. No aneurysm or dissection.   PERITONEUM/RETROPERITONEUM/LYMPH NODES: No retroperitoneal masses are seen.  No lymphadenopathy.   BONES AND ABDOMINAL WALL: Multilevel discogenic degenerative changes throughout the spine. No destructive bone lesions.       1. Ill-defined low-attenuation mass involving the uncinate process of the pancreas highly concerning for neoplastic process such as adeno carcinoma of the pancreas. Further evaluation with MRI is suggested for better characterization. 2. Additional detailed nonacute findings as above.   Critical Finding:  See findings. Notification was initiated on 1/2/2025 at 10:29 pm by  Jose Barnett.  (**-YCF-**) Instructions:   Signed by: Jose Barnett 1/2/2025 10:29 PM Dictation workstation:   [id number]      EKG:   No results found for: \"EKG\"      Radiology:     No orders to display       Assessment/Plan:         Patient Active Problem List   Diagnosis    CAD (coronary artery disease)    Current every day smoker    Diastolic dysfunction    Erectile dysfunction    History of coronary artery stent placement    Hyperlipidemia    NSTEMI, initial episode of care (Multi)    Obesity, Class I, BMI 30-34.9    Subsequent non-ST elevation (NSTEMI) myocardial infarction    Vitamin D deficiency    Hypertension    Encounter for annual physical exam    Depression screen    BMI 33.0-33.9,adult         ASSESSMENT       59-year-old gentleman here for routine cardiac follow-up.    Meds, vitals, examination are as noted.    Chart reviewed in detail discussed with the patient and his wife.    Impression:  ASHD functional class I-II  Prior PCI and stenting  Prior non-STEMI  Hypertension  Dyslipidemia  Pancreatic mass  PLAN     Recommendation:  Proceed with endoscopic biopsies  Can hold aspirin and Plavix if necessary " until okay with surgical team  See me back in 3 months for update  Call if any problems or issues otherwise develop

## 2025-01-23 NOTE — PATIENT INSTRUCTIONS
Continue same medications/treatment.  Patient educated on proper medication use.  Patient educated on risk factor modification.  Please bring any lab results from other providers/physicians to your next appointment.    Please bring all medicines, vitamins, and herbal supplements with you when you come to the office.    Prescriptions will not be filled unless you are compliant with your follow up appointments or have a follow up appointment scheduled as per instruction of your physician. Refills should be requested at the time of your visit.    Follow up in 3-4 months  You can stop the Aspirin and Plavix     I, SEDA CHRISTOPHER RN, AM SCRIBING FOR AND IN THE PRESENCE OF DR. SHEA HERNÁNDEZ, DO, FACC

## 2025-01-23 NOTE — TELEPHONE ENCOUNTER
Per Dr. Eliezer Puente, DO patient is an acceptable risk at this time from a cardiac standpoint.  Patient to hold Plavix and ASA for 5 days prior and resume ASAP.  I notified patient and he verbalized understanding.  Faxed to [phone]

## 2025-01-23 NOTE — PROGRESS NOTES
Patient:  Sathish Iqbal  YOB: 1965  MRN: [id number]       Chief Complaint/Active Symptoms:       Sathish Iqbal is a 59 y.o. male who returns today for cardiac follow-up.  Patient is doing well from a cardiac standpoint.  Has coronary disease and prior stent.  Has hyperlipidemia and remote non-STEMI.  Blood pressure is controlled.  Unfortunately he recently had some abdominal pain and went to the emergency room.  He was found to have a small pancreatic mass for which now he is seen surgical oncologist who thinks this could be a cancerous mass.  He is madeline be undergoing an endoscopic biopsy procedure in a week or 2.  He has been told to stop taking his Plavix for the time being be.  Cardiac wise he is stable free of any problems that should be suitable to proceed without any significant risk.  He can resume his antiplatelet drugs when okay with his GI and surgical team.  He is madeline check to see if they also want to stop the aspirin.  From my perspective to see how he is progressing we will see him back in 3 months.  No planned cardiac studies at this point.      Objective:     Vitals:    01/23/25 1242   BP: 138/80   Pulse: 60       Vitals:    01/23/25 1242   Weight: 97.9 kg (215 lb 12.8 oz)       Allergies:     No Known Allergies       Medications:     Current Outpatient Medications   Medication Instructions    aspirin 81 mg, oral, Daily    atorvastatin (LIPITOR) 40 mg, oral, Nightly    clopidogrel (PLAVIX) 75 mg, oral, Daily    famotidine (PEPCID) 20 mg, oral, 2 times daily    losartan (COZAAR) 50 mg, oral, Daily    metoprolol succinate XL (TOPROL-XL) 50 mg, oral, Daily    nitroglycerin (NITROSTAT) 0.4 mg, sublingual, Every 5 min PRN    sildenafil (VIAGRA) 50 mg, As needed       Physical Examination:   Constitutional:       Appearance: Healthy appearance. Not in distress.   Neck:      Vascular: No JVR. JVD normal.   Pulmonary:      Effort: Pulmonary effort is normal.      Breath sounds: Normal  "breath sounds. No wheezing. No rhonchi. No rales.   Chest:      Chest wall: Not tender to palpatation.   Cardiovascular:      PMI at left midclavicular line. Normal rate. Regular rhythm. Normal S1. Normal S2.       Murmurs: There is no murmur.      No gallop.  No click. No rub.   Pulses:     Intact distal pulses.   Edema:     Peripheral edema absent.   Abdominal:      General: Bowel sounds are normal.      Palpations: Abdomen is soft.      Tenderness: There is no abdominal tenderness.   Musculoskeletal: Normal range of motion.         General: No tenderness. Skin:     General: Skin is warm and dry.   Neurological:      General: No focal deficit present.      Mental Status: Alert and oriented to person, place and time.            Lab:     CBC:   Lab Results   Component Value Date    WBC 9.2 01/23/2025    RBC 5.13 01/23/2025    HGB 15.9 01/23/2025    HCT 46.7 01/23/2025     01/23/2025        CMP:    Lab Results   Component Value Date     01/23/2025    K 4.1 01/23/2025     01/23/2025    CO2 28 01/23/2025    BUN 12 01/23/2025    CREATININE 0.80 01/23/2025    GLUCOSE 108 (H) 01/23/2025    CALCIUM 10.0 01/23/2025       Magnesium:    Lab Results   Component Value Date    MG 2.30 07/17/2023       Lipid Profile:    Lab Results   Component Value Date    TRIG 203 (H) 06/03/2023    TRIG CANCELED 06/03/2023    HDL 39.3 (A) 06/03/2023    HDL CANCELED 06/03/2023       TSH:    Lab Results   Component Value Date    TSH 1.63 04/24/2021       BNP:   Lab Results   Component Value Date     (H) 07/17/2023        PT/INR:    Lab Results   Component Value Date    PROTIME 11.3 07/17/2023    INR 1.0 07/17/2023       HgBA1c:    No results found for: \"HGBA1C\"    BMP:  Lab Results   Component Value Date     01/23/2025     (L) 01/02/2025     07/17/2023     06/02/2023     06/02/2023    K 4.1 01/23/2025    K 4.0 01/02/2025    K 4.4 07/17/2023    K 4.4 06/02/2023    K 4.1 06/02/2023     " 01/23/2025     01/02/2025     07/17/2023     06/02/2023     06/02/2023    CO2 28 01/23/2025    CO2 26 01/02/2025    CO2 24 07/17/2023    CO2 27 06/02/2023    CO2 26 06/02/2023    BUN 12 01/23/2025    BUN 15 01/02/2025    BUN 13 07/17/2023    BUN 14 06/02/2023    BUN 15 06/02/2023    CREATININE 0.80 01/23/2025    CREATININE 0.91 01/02/2025    CREATININE 0.96 07/17/2023    CREATININE 0.89 06/02/2023    CREATININE 1.01 06/02/2023       CBC:  Lab Results   Component Value Date    WBC 9.2 01/23/2025    WBC 10.9 01/02/2025    WBC 8.9 07/17/2023    WBC 13.5 (H) 06/02/2023    WBC 10.6 06/02/2023    RBC 5.13 01/23/2025    RBC 5.06 01/02/2025    RBC 4.51 07/17/2023    RBC 4.85 06/02/2023    RBC 5.16 06/02/2023    HGB 15.9 01/23/2025    HGB 16.0 01/02/2025    HGB 14.1 07/17/2023    HGB 15.1 06/02/2023    HGB 15.9 06/02/2023    HCT 46.7 01/23/2025    HCT 45.5 01/02/2025    HCT 40.8 (L) 07/17/2023    HCT 44.0 06/02/2023    HCT 46.8 06/02/2023    MCV 91 01/23/2025    MCV 90 01/02/2025    MCV 90 07/17/2023    MCV 91 06/02/2023    MCV 91 06/02/2023    MCH 31.0 01/23/2025    MCH 31.6 01/02/2025    MCHC 34.0 01/23/2025    MCHC 35.2 01/02/2025    MCHC 34.6 07/17/2023    MCHC 34.3 06/02/2023    MCHC 34.0 06/02/2023    RDW 11.7 01/23/2025    RDW 11.8 01/02/2025    RDW 12.3 07/17/2023    RDW 12.2 06/02/2023    RDW 12.0 06/02/2023     01/23/2025     01/02/2025     07/17/2023     06/02/2023     06/02/2023       Cardiac Enzymes:    Lab Results   Component Value Date    TROPHS 5 01/02/2025    TROPHS CANCELED 07/18/2023    TROPHS 6 07/17/2023       Hepatic Function Panel:    Lab Results   Component Value Date    ALKPHOS 77 01/23/2025    ALT 20 01/23/2025    AST 16 01/23/2025    PROT 7.8 01/23/2025    BILITOT 0.9 01/23/2025         Diagnostic Studies:     ECG 12 lead    Result Date: 1/12/2025  Normal sinus rhythm Minimal voltage criteria for LVH, may be normal variant Nonspecific T  wave abnormality Abnormal ECG When compared with ECG of 17-JUL-2023 10:44, Nonspecific T wave abnormality now evident in Anterior leads See ED provider note for full interpretation and clinical correlation Confirmed by Erinn James ([id number]) on 1/12/2025 10:36:42 AM    CT abdomen pelvis w IV contrast    Result Date: 1/2/2025  Interpreted By:  Jose Barnett, STUDY: CT ABDOMEN PELVIS W IV CONTRAST;  1/2/2025 9:56 pm   INDICATION: Signs/Symptoms:epigastric pain.   COMPARISON: None.   ACCESSION NUMBER(S): [id number]   ORDERING CLINICIAN: CHARITY MERCADO   TECHNIQUE: CT of the abdomen and pelvis was performed.  Standard contiguous axial images were obtained at 3 mm slice thickness through the abdomen and pelvis. Coronal and sagittal reconstructions at 3 mm slice thickness were performed.   75  ml of contrast Omnipaque 350 were administered intravenously without immediate complication.   FINDINGS: LOWER CHEST: The visualized bases are clear bilaterally. No sizable hiatal hernia is seen.   ABDOMEN:   LIVER: Liver is normal in size. No focal lesions are seen.   BILE DUCTS: Biliary system is nondilated.   GALLBLADDER: Within normal limits as distended. No radiodense calculi. No pericholecystic free fluid.   PANCREAS: There is a 2.7 x 3.4 cm low-attenuation area involving uncinate process of the pancreas concerning for neoplastic process such as adeno carcinoma. No significant pancreatic duct dilatation is seen. The mass lesion abuts the branches of the SMV. No significant peripancreatic fat stranding is seen.   SPLEEN: The spleen is normal in size. No focal abnormalities are seen.   ADRENAL GLANDS: The adrenal glands bilaterally within normal limits.   KIDNEYS AND URETERS: No hydronephrosis or renal masses. No perinephric stranding. No radiodense renal calculi.   PELVIS:   BLADDER: The urinary bladder is decompressed. No bladder calculi or masses are seen.   REPRODUCTIVE ORGANS: Diffuse calcification of prostate. No pelvic free  "fluid, masses or lymphadenopathy   BOWEL: The small and large bowel are nondilated.  The appendix is normal .   VESSELS: Diffuse wall calcification of the aorta and its main branches. No aneurysm or dissection.   PERITONEUM/RETROPERITONEUM/LYMPH NODES: No retroperitoneal masses are seen.  No lymphadenopathy.   BONES AND ABDOMINAL WALL: Multilevel discogenic degenerative changes throughout the spine. No destructive bone lesions.       1. Ill-defined low-attenuation mass involving the uncinate process of the pancreas highly concerning for neoplastic process such as adeno carcinoma of the pancreas. Further evaluation with MRI is suggested for better characterization. 2. Additional detailed nonacute findings as above.   Critical Finding:  See findings. Notification was initiated on 1/2/2025 at 10:29 pm by  Jose Barnett.  (**-YCF-**) Instructions:   Signed by: Jose Barnett 1/2/2025 10:29 PM Dictation workstation:   [id number]      EKG:   No results found for: \"EKG\"      Radiology:     No orders to display       Assessment/Plan:         Patient Active Problem List   Diagnosis    CAD (coronary artery disease)    Current every day smoker    Diastolic dysfunction    Erectile dysfunction    History of coronary artery stent placement    Hyperlipidemia    NSTEMI, initial episode of care (Multi)    Obesity, Class I, BMI 30-34.9    Subsequent non-ST elevation (NSTEMI) myocardial infarction    Vitamin D deficiency    Hypertension    Encounter for annual physical exam    Depression screen    BMI 33.0-33.9,adult         ASSESSMENT       59-year-old gentleman here for routine cardiac follow-up.    Meds, vitals, examination are as noted.    Chart reviewed in detail discussed with the patient and his wife.    Impression:  ASHD functional class I-II  Prior PCI and stenting  Prior non-STEMI  Hypertension  Dyslipidemia  Pancreatic mass  PLAN     Recommendation:  Proceed with endoscopic biopsies  Can hold aspirin and Plavix if necessary " until okay with surgical team  See me back in 3 months for update  Call if any problems or issues otherwise develop

## 2025-01-24 ENCOUNTER — HOSPITAL ENCOUNTER (OUTPATIENT)
Dept: RADIOLOGY | Facility: HOSPITAL | Age: 60
Discharge: HOME | End: 2025-01-24
Payer: COMMERCIAL

## 2025-01-24 DIAGNOSIS — K86.89 MASS OF HEAD OF PANCREAS (HHS-HCC): ICD-10-CM

## 2025-01-24 LAB — CANCER AG19-9 SERPL-ACNC: <4 U/ML

## 2025-01-24 PROCEDURE — 71250 CT THORAX DX C-: CPT

## 2025-01-29 ENCOUNTER — ANESTHESIA EVENT (OUTPATIENT)
Dept: GASTROENTEROLOGY | Facility: HOSPITAL | Age: 60
End: 2025-01-29
Payer: COMMERCIAL

## 2025-01-29 ENCOUNTER — HOSPITAL ENCOUNTER (OUTPATIENT)
Dept: GASTROENTEROLOGY | Facility: HOSPITAL | Age: 60
Discharge: HOME | End: 2025-01-29
Payer: COMMERCIAL

## 2025-01-29 ENCOUNTER — ANESTHESIA (OUTPATIENT)
Dept: GASTROENTEROLOGY | Facility: HOSPITAL | Age: 60
End: 2025-01-29
Payer: COMMERCIAL

## 2025-01-29 VITALS
OXYGEN SATURATION: 97 % | DIASTOLIC BLOOD PRESSURE: 79 MMHG | RESPIRATION RATE: 17 BRPM | WEIGHT: 218 LBS | HEIGHT: 69 IN | SYSTOLIC BLOOD PRESSURE: 153 MMHG | HEART RATE: 70 BPM | TEMPERATURE: 98.2 F | BODY MASS INDEX: 32.29 KG/M2

## 2025-01-29 DIAGNOSIS — K86.89 MASS OF HEAD OF PANCREAS (HHS-HCC): Primary | ICD-10-CM

## 2025-01-29 PROCEDURE — 88112 CYTOPATH CELL ENHANCE TECH: CPT | Mod: TC | Performed by: INTERNAL MEDICINE

## 2025-01-29 PROCEDURE — 7100000009 HC PHASE TWO TIME - INITIAL BASE CHARGE

## 2025-01-29 PROCEDURE — 43238 EGD US FINE NEEDLE BX/ASPIR: CPT | Performed by: INTERNAL MEDICINE

## 2025-01-29 PROCEDURE — 3700000002 HC GENERAL ANESTHESIA TIME - EACH INCREMENTAL 1 MINUTE

## 2025-01-29 PROCEDURE — 3700000001 HC GENERAL ANESTHESIA TIME - INITIAL BASE CHARGE

## 2025-01-29 PROCEDURE — 2500000004 HC RX 250 GENERAL PHARMACY W/ HCPCS (ALT 636 FOR OP/ED): Performed by: NURSE ANESTHETIST, CERTIFIED REGISTERED

## 2025-01-29 PROCEDURE — 7100000010 HC PHASE TWO TIME - EACH INCREMENTAL 1 MINUTE

## 2025-01-29 PROCEDURE — 2720000007 HC OR 272 NO HCPCS

## 2025-01-29 RX ORDER — HYDROMORPHONE HYDROCHLORIDE 1 MG/ML
1 INJECTION, SOLUTION INTRAMUSCULAR; INTRAVENOUS; SUBCUTANEOUS EVERY 5 MIN PRN
OUTPATIENT
Start: 2025-01-29

## 2025-01-29 RX ORDER — HYDRALAZINE HYDROCHLORIDE 20 MG/ML
5 INJECTION INTRAMUSCULAR; INTRAVENOUS EVERY 30 MIN PRN
OUTPATIENT
Start: 2025-01-29

## 2025-01-29 RX ORDER — MIDAZOLAM HYDROCHLORIDE 1 MG/ML
1 INJECTION, SOLUTION INTRAMUSCULAR; INTRAVENOUS ONCE AS NEEDED
OUTPATIENT
Start: 2025-01-29

## 2025-01-29 RX ORDER — ONDANSETRON HYDROCHLORIDE 2 MG/ML
4 INJECTION, SOLUTION INTRAVENOUS ONCE AS NEEDED
OUTPATIENT
Start: 2025-01-29

## 2025-01-29 RX ORDER — SODIUM CHLORIDE, SODIUM LACTATE, POTASSIUM CHLORIDE, CALCIUM CHLORIDE 600; 310; 30; 20 MG/100ML; MG/100ML; MG/100ML; MG/100ML
100 INJECTION, SOLUTION INTRAVENOUS CONTINUOUS
OUTPATIENT
Start: 2025-01-29 | End: 2025-01-30

## 2025-01-29 RX ORDER — FENTANYL CITRATE 50 UG/ML
INJECTION, SOLUTION INTRAMUSCULAR; INTRAVENOUS AS NEEDED
Status: DISCONTINUED | OUTPATIENT
Start: 2025-01-29 | End: 2025-01-29

## 2025-01-29 RX ORDER — MEPERIDINE HYDROCHLORIDE 50 MG/ML
12.5 INJECTION INTRAMUSCULAR; INTRAVENOUS; SUBCUTANEOUS EVERY 10 MIN PRN
OUTPATIENT
Start: 2025-01-29

## 2025-01-29 RX ORDER — DIPHENHYDRAMINE HYDROCHLORIDE 50 MG/ML
12.5 INJECTION INTRAMUSCULAR; INTRAVENOUS ONCE AS NEEDED
OUTPATIENT
Start: 2025-01-29

## 2025-01-29 RX ORDER — FENTANYL CITRATE 50 UG/ML
12.5 INJECTION, SOLUTION INTRAMUSCULAR; INTRAVENOUS EVERY 5 MIN PRN
OUTPATIENT
Start: 2025-01-29

## 2025-01-29 RX ORDER — PROPOFOL 10 MG/ML
INJECTION, EMULSION INTRAVENOUS AS NEEDED
Status: DISCONTINUED | OUTPATIENT
Start: 2025-01-29 | End: 2025-01-29

## 2025-01-29 RX ORDER — ALBUTEROL SULFATE 0.83 MG/ML
2.5 SOLUTION RESPIRATORY (INHALATION) ONCE AS NEEDED
OUTPATIENT
Start: 2025-01-29

## 2025-01-29 RX ORDER — ACETAMINOPHEN 325 MG/1
975 TABLET ORAL ONCE
OUTPATIENT
Start: 2025-01-29 | End: 2025-01-29

## 2025-01-29 RX ORDER — LIDOCAINE HYDROCHLORIDE 10 MG/ML
0.1 INJECTION, SOLUTION INFILTRATION; PERINEURAL ONCE
OUTPATIENT
Start: 2025-01-29 | End: 2025-01-29

## 2025-01-29 RX ORDER — LIDOCAINE HYDROCHLORIDE 20 MG/ML
INJECTION, SOLUTION INFILTRATION; PERINEURAL AS NEEDED
Status: DISCONTINUED | OUTPATIENT
Start: 2025-01-29 | End: 2025-01-29

## 2025-01-29 RX ORDER — OXYCODONE HYDROCHLORIDE 5 MG/1
5 TABLET ORAL EVERY 4 HOURS PRN
OUTPATIENT
Start: 2025-01-29

## 2025-01-29 RX ADMIN — FENTANYL CITRATE 50 MCG: 50 INJECTION, SOLUTION INTRAMUSCULAR; INTRAVENOUS at 14:09

## 2025-01-29 RX ADMIN — PROPOFOL 120 MCG/KG/MIN: 10 INJECTION, EMULSION INTRAVENOUS at 14:10

## 2025-01-29 RX ADMIN — PROPOFOL 100 MG: 10 INJECTION, EMULSION INTRAVENOUS at 14:09

## 2025-01-29 RX ADMIN — LIDOCAINE HYDROCHLORIDE 50 MG: 20 INJECTION, SOLUTION INFILTRATION; PERINEURAL at 14:09

## 2025-01-29 ASSESSMENT — COLUMBIA-SUICIDE SEVERITY RATING SCALE - C-SSRS
2. HAVE YOU ACTUALLY HAD ANY THOUGHTS OF KILLING YOURSELF?: NO
1. IN THE PAST MONTH, HAVE YOU WISHED YOU WERE DEAD OR WISHED YOU COULD GO TO SLEEP AND NOT WAKE UP?: NO
6. HAVE YOU EVER DONE ANYTHING, STARTED TO DO ANYTHING, OR PREPARED TO DO ANYTHING TO END YOUR LIFE?: NO

## 2025-01-29 ASSESSMENT — PAIN DESCRIPTION - DESCRIPTORS: DESCRIPTORS: THROBBING

## 2025-01-29 ASSESSMENT — PAIN SCALES - GENERAL
PAINLEVEL_OUTOF10: 0 - NO PAIN
PAINLEVEL_OUTOF10: 5 - MODERATE PAIN

## 2025-01-29 ASSESSMENT — PAIN - FUNCTIONAL ASSESSMENT
PAIN_FUNCTIONAL_ASSESSMENT: 0-10

## 2025-01-29 NOTE — DISCHARGE INSTRUCTIONS

## 2025-01-29 NOTE — ANESTHESIA POSTPROCEDURE EVALUATION
"Patient: Sathish Iqbal \"Bill\"    Procedure Summary       Date: 01/29/25 [place] [facility] [place]    Anesthesia Start: 1404 Anesthesia Stop: 1455    Procedure: ENDOSCOPIC ULTRASOUND (UPPER) Diagnosis: Mass of head of pancreas (HHS-HCC)    Scheduled Providers: Laura Hernandez MD Responsible Provider: Kane Weeks DO    Anesthesia Type: MAC ASA Status: 3            Anesthesia Type: MAC    Vitals Value Taken Time   /86 01/29/25 1455   Temp 36.4 01/29/25 1455   Pulse 72 01/29/25 1455   Resp 16 01/29/25 1455   SpO2 97 01/29/25 1455       Anesthesia Post Evaluation    Patient location during evaluation: PACU  Patient participation: complete - patient participated  Level of consciousness: awake  Pain management: adequate  Airway patency: patent  Cardiovascular status: acceptable  Respiratory status: acceptable  Hydration status: acceptable  Postoperative Nausea and Vomiting: none      No notable events documented.    "

## 2025-01-31 LAB
LAB AP ASR DISCLAIMER: NORMAL
LABORATORY COMMENT REPORT: NORMAL
PATH REPORT.FINAL DX SPEC: NORMAL
PATH REPORT.FINAL DX SPEC: NORMAL
PATH REPORT.GROSS SPEC: NORMAL
PATH REPORT.GROSS SPEC: NORMAL
PATH REPORT.RELEVANT HX SPEC: NORMAL
PATH REPORT.TOTAL CANCER: NORMAL
PATH REPORT.TOTAL CANCER: NORMAL
RESIDENT REVIEW: NORMAL

## 2025-02-03 ENCOUNTER — TELEPHONE (OUTPATIENT)
Dept: SURGICAL ONCOLOGY | Facility: HOSPITAL | Age: 60
End: 2025-02-03
Payer: COMMERCIAL

## 2025-02-03 PROBLEM — C25.9 ADENOCARCINOMA OF PANCREAS (MULTI): Status: ACTIVE | Noted: 2025-02-03

## 2025-02-03 NOTE — TELEPHONE ENCOUNTER
Would like a call to go over results of recent testing and discuss next steps. He got a call from Dr. Hernandez and was told to follow up with you.

## 2025-02-05 LAB
LAB AP ASR DISCLAIMER: NORMAL
LABORATORY COMMENT REPORT: NORMAL
PATH REPORT.ADDENDUM SPEC: NORMAL
PATH REPORT.FINAL DX SPEC: NORMAL
PATH REPORT.GROSS SPEC: NORMAL
PATH REPORT.RELEVANT HX SPEC: NORMAL
PATH REPORT.TOTAL CANCER: NORMAL
RESIDENT REVIEW: NORMAL

## 2025-02-08 NOTE — PROGRESS NOTES
"Patient ID: Hardy Iqbal is a 59 y.o. male.  Referring Physician: Drew Castro MD  [id number] [facility]  Department of Surgery-[place] [address]  Primary Care Provider: Tamy Pascual MD      Subjective    HPI  Mr. Sathish Iqbal is a 58 y/o M presenting for initial consultation at the request of Dr. Drew Castro for diagnosis of pancreatic cancer. Patient was initially presented to the emergency room on 1/2/25 for worsening abdominal pain. CT abdomen pelvis at that time revealed a defined mass involving the uncinate process of the pancreas. Patient subsequently had a biopsy on 1/29/25, which showed invasive adenocarcinoma poorly differentiated. MSI status intact. CA 19-9 less than 4. CT of the chest on 1/24/25 showed no clear evidence of metastases. Patient met with Dr. Castro on 1/23/25. He plans for adjuvant chemotherapy, which he presents to discuss today.     His wife accompanied him today. He has not had a bowel movement today but has been regular throughout the week. Constipation has recently become a major issue. He is unable to sleep on his stomach due to discomfort. No new cough, chest pain, rashes, leg swelling, or signs of bowel obstruction. He has three older children, and his wife has two. Patient smokes, but is trying to reduce the amount he smokes a day. He has not been drinking alcoholic beverages for the past 3 weeks. No family history of cancers. However, there is family history of diabetes and myocardial infarctions.     Patient's past medical history, surgical history, family history and social history reviewed.    Review of Systems:   Review of Systems:    Positive per HPI, otherwise negative.     Objective   /81   Pulse 70   Temp 36.2 °C (97.2 °F)   Resp 18   Ht 1.759 m (5' 9.25\")   Wt 92.7 kg (204 lb 5.9 oz)   SpO2 97%   BMI 29.96 kg/m²     Sathish Iqbal \"Bill\"  reports that he has been smoking cigarettes. He started smoking about 43 years ago. He has " a 10.8 pack-year smoking history. He has never used smokeless tobacco.  He  reports that he does not currently use alcohol.  He  reports no history of drug use.    Physical Exam  Gen: appears well in clinic, NAD  HEENT: atraumatic head, normocephalic, EOMI, conjunctiva normal  LUNG: no increased WOB, CTAB  CV: No JVD. RRR  GI: soft, NT, ND  LE: no LE edema  Skin: no obvious rashes or lesions on visible skin  Neuro: interactive, no focal deficits noted  Psych: normal mood and affect    Performance Status:  Symptomatic; fully ambulatory  Labs/Imaging/Pathology: personally reviewed reports and images in Epic electronic medical record system. Pertinent results as it related to the plan represented in below in assessment and plan.     Assessment/Plan    Invasive adenocarcinoma of pancreas, MSI-S  - Initial CA 19-9 less than 4   - Patient was initially presented to the emergency room on 1/2/25 for worsening abdominal pain.   - CT abdomen pelvis at that time revealed a defined mass involving the uncinate process of the pancreas  - biopsy on 1/29/25, which showed invasive adenocarcinoma poorly differentiated. MSI status intact  - CT of the chest on 1/24/25 showed no clear evidence of metastases  - met with Dr. Castro on 1/23/25 and presents to discuss neoadjuvant  chemotherapy today  - patient has a good PS and discussed FOLFIRINOS with 5-FU, oxaliplatin, and irinotecan  - Reviewed side effects, consent signed.   - Planned for labs today including  DDPD, CBC, CMP, and UGT1A1  - genetics referral  - dietician referral to meet on D1  - will send tissue off for [facility]  - Will plan for port placement this week with goal to start C1 next week  - RTC with me for cycle 2       RTC with me for cycle 2 . This note has been transcribed using a medical scribe and there is a possibility of unintentional typing misprints.    Diagnoses and all orders for this visit:  Adenocarcinoma of pancreas (Multi)  -     IR CVC port placement;  Future  -     Hepatitis B Core Antibody, Total; Future  -     Hepatitis B surface antibody; Future  -     Hepatitis B surface antigen; Future  -     ondansetron (Zofran) 8 mg tablet; Take 1 tablet (8 mg) by mouth every 8 hours if needed for nausea or vomiting.  -     prochlorperazine (Compazine) 10 mg tablet; Take 1 tablet (10 mg) by mouth every 6 hours if needed for nausea or vomiting.  -     loperamide (Imodium) 2 mg capsule; Take 2 capsules (4 mg) by mouth with the first episode of diarrhea and 1 capsule (2 mg) by mouth with any additional episodes. Maximum 8 capsules (16 mg) per day.  -     UGT1A1; Other-indicate in comment; unknown - Miscellaneous Test; Future  -     CBC and Auto Differential; Future  -     Comprehensive metabolic panel; Future  -     Hepatitis B surface antigen; Future  -     Hepatitis B Core Antibody, Total; Future  -     Hepatitis B surface antibody; Future  -     Dihydropyrimidine Dehydrogenase Genotype; Future  -     CBC and Auto Differential; Future  -     Clinic Appointment Request; Future  -     Infusion Appointment Request; Future  -     Oncology Line Draw Appointment Request; Future  -     Infusion Appointment Request; Future  -     Clinic Appointment Request; Future  -     Infusion Appointment Request; Future  -     Oncology Line Draw Appointment Request; Future  -     CBC and Auto Differential; Future  -     Comprehensive metabolic panel; Future  -     Infusion Appointment Request; Future  -     Referral to Nutrition Services; Future  -     Referral to Genetics; Future  -     [facility] Solid Tumor DNA/RNA (xT/xR, paired Germline testing optional); Future  Mass of head of pancreas (HHS-HCC)  -     Referral To Hematology and Oncology  -     Referral to Nutrition Services; Future  Other orders  -     heparin flush 10 unit/mL syringe 50 Units  -     heparin flush 100 unit/mL syringe 500 Units  -     alteplase (Cathflo Activase) injection 2 mg  -     ondansetron (Zofran) 16 mg in dextrose  5% 50 mL IV  -     atropine syringe 0.4 mg  -     diphenhydrAMINE (BENADryl) capsule 50 mg  -     atropine injection 0.4 mg  -     dexAMETHasone (Decadron) 12 mg in dextrose 5% 50 mL IV  -     prochlorperazine (Compazine) tablet 10 mg  -     prochlorperazine (Compazine) injection 10 mg  -     OXALIplatin (Eloxatin) 180 mg in dextrose 5% 536 mL IV  -     leucovorin 860 mg in dextrose 5% 143 mL IV  -     irinotecan (Camptosar) 400 mg in dextrose 5% 520 mL IV  -     fluorouracil (Adrucil) 5,100 mg in sodium chloride 0.9% 138 mL IV via Home Infusion  -     LORazepam (Ativan) injection 1 mg  -     sodium chloride 0.9 % bolus 500 mL  -     dextrose 5 % in water (D5W) bolus 500 mL  -     diphenhydrAMINE (BENADryl) injection 50 mg  -     methylPREDNISolone sod succinate (SOLU-Medrol) 40 mg/mL injection 40 mg  -     famotidine PF (Pepcid) injection 20 mg  -     EPINEPHrine (Epipen) injection syringe 0.3 mg  -     albuterol 2.5 mg /3 mL (0.083 %) nebulizer solution 3 mL  -     dexAMETHasone (Decadron) injection 8 mg  -     palonosetron (Aloxi) injection 250 mcg  -     pegfilgrastim-fpgk (Stimufend) injection 6 mg  -     sodium chloride 0.9 % bolus 500 mL  -     dextrose 5 % in water (D5W) bolus 500 mL  -     diphenhydrAMINE (BENADryl) injection 50 mg  -     methylPREDNISolone sod succinate (SOLU-Medrol) 40 mg/mL injection 40 mg  -     famotidine PF (Pepcid) injection 20 mg  -     EPINEPHrine (Epipen) injection syringe 0.3 mg  -     albuterol 2.5 mg /3 mL (0.083 %) nebulizer solution 3 mL  -     ondansetron (Zofran) 16 mg in dextrose 5% 50 mL IV  -     atropine syringe 0.4 mg  -     diphenhydrAMINE (BENADryl) capsule 50 mg  -     atropine injection 0.4 mg  -     dexAMETHasone (Decadron) 12 mg in dextrose 5% 50 mL IV  -     prochlorperazine (Compazine) tablet 10 mg  -     prochlorperazine (Compazine) injection 10 mg  -     OXALIplatin (Eloxatin) 180 mg in dextrose 5% 536 mL IV  -     leucovorin 860 mg in dextrose 5% 143 mL  IV  -     irinotecan (Camptosar) 400 mg in dextrose 5% 520 mL IV  -     fluorouracil (Adrucil) 5,100 mg in sodium chloride 0.9% 138 mL IV via Home Infusion  -     LORazepam (Ativan) injection 1 mg  -     sodium chloride 0.9 % bolus 500 mL  -     dextrose 5 % in water (D5W) bolus 500 mL  -     diphenhydrAMINE (BENADryl) injection 50 mg  -     methylPREDNISolone sod succinate (SOLU-Medrol) 40 mg/mL injection 40 mg  -     famotidine PF (Pepcid) injection 20 mg  -     EPINEPHrine (Epipen) injection syringe 0.3 mg  -     albuterol 2.5 mg /3 mL (0.083 %) nebulizer solution 3 mL  -     dexAMETHasone (Decadron) injection 8 mg  -     palonosetron (Aloxi) injection 250 mcg  -     pegfilgrastim-fpgk (Stimufend) injection 6 mg  -     sodium chloride 0.9 % bolus 500 mL  -     dextrose 5 % in water (D5W) bolus 500 mL  -     diphenhydrAMINE (BENADryl) injection 50 mg  -     methylPREDNISolone sod succinate (SOLU-Medrol) 40 mg/mL injection 40 mg  -     famotidine PF (Pepcid) injection 20 mg  -     EPINEPHrine (Epipen) injection syringe 0.3 mg  -     albuterol 2.5 mg /3 mL (0.083 %) nebulizer solution 3 mL    Ashley Mesa MD  Hematology/Oncology  [facility]    Scribe Attestation  By signing my name below, IYanira Otoniel Mcnally attest that this documentation has been prepared under the direction and in the presence of Ashley Mesa MD.    Time Spent  Prep time on day of patient encounter: 5 minutes  Time spent directly with patient, family or caregiver: 60 minutes  Additional Time Spent on Patient Care Activities: 10 minutes  Documentation Time: 7 minutes  Other Time Spent: 0 minutes  Total: 82 minutes

## 2025-02-10 ENCOUNTER — OFFICE VISIT (OUTPATIENT)
Dept: HEMATOLOGY/ONCOLOGY | Facility: CLINIC | Age: 60
End: 2025-02-10
Payer: COMMERCIAL

## 2025-02-10 ENCOUNTER — LAB (OUTPATIENT)
Dept: LAB | Facility: CLINIC | Age: 60
End: 2025-02-10
Payer: COMMERCIAL

## 2025-02-10 ENCOUNTER — PATIENT OUTREACH (OUTPATIENT)
Dept: HEMATOLOGY/ONCOLOGY | Facility: CLINIC | Age: 60
End: 2025-02-10

## 2025-02-10 ENCOUNTER — DOCUMENTATION (OUTPATIENT)
Dept: HEMATOLOGY/ONCOLOGY | Facility: CLINIC | Age: 60
End: 2025-02-10

## 2025-02-10 VITALS
HEIGHT: 69 IN | SYSTOLIC BLOOD PRESSURE: 141 MMHG | WEIGHT: 204.37 LBS | RESPIRATION RATE: 18 BRPM | HEART RATE: 70 BPM | BODY MASS INDEX: 30.27 KG/M2 | OXYGEN SATURATION: 97 % | DIASTOLIC BLOOD PRESSURE: 81 MMHG | TEMPERATURE: 97.2 F

## 2025-02-10 DIAGNOSIS — C25.9 ADENOCARCINOMA OF PANCREAS (MULTI): ICD-10-CM

## 2025-02-10 DIAGNOSIS — K86.89 MASS OF HEAD OF PANCREAS (HHS-HCC): ICD-10-CM

## 2025-02-10 DIAGNOSIS — C25.9 ADENOCARCINOMA OF PANCREAS (MULTI): Primary | ICD-10-CM

## 2025-02-10 LAB
BASOPHILS # BLD AUTO: 0.02 X10*3/UL (ref 0–0.1)
BASOPHILS NFR BLD AUTO: 0.2 %
EOSINOPHIL # BLD AUTO: 0.24 X10*3/UL (ref 0–0.7)
EOSINOPHIL NFR BLD AUTO: 2.3 %
ERYTHROCYTE [DISTWIDTH] IN BLOOD BY AUTOMATED COUNT: 11.5 % (ref 11.5–14.5)
HCT VFR BLD AUTO: 44.3 % (ref 41–52)
HGB BLD-MCNC: 15.3 G/DL (ref 13.5–17.5)
HOLD SPECIMEN: NORMAL
IMM GRANULOCYTES # BLD AUTO: 0.01 X10*3/UL (ref 0–0.7)
IMM GRANULOCYTES NFR BLD AUTO: 0.1 % (ref 0–0.9)
LYMPHOCYTES # BLD AUTO: 1.99 X10*3/UL (ref 1.2–4.8)
LYMPHOCYTES NFR BLD AUTO: 18.8 %
MCH RBC QN AUTO: 30.4 PG (ref 26–34)
MCHC RBC AUTO-ENTMCNC: 34.5 G/DL (ref 32–36)
MCV RBC AUTO: 88 FL (ref 80–100)
MONOCYTES # BLD AUTO: 0.59 X10*3/UL (ref 0.1–1)
MONOCYTES NFR BLD AUTO: 5.6 %
NEUTROPHILS # BLD AUTO: 7.75 X10*3/UL (ref 1.2–7.7)
NEUTROPHILS NFR BLD AUTO: 73 %
PLATELET # BLD AUTO: 259 X10*3/UL (ref 150–450)
RBC # BLD AUTO: 5.04 X10*6/UL (ref 4.5–5.9)
WBC # BLD AUTO: 10.6 X10*3/UL (ref 4.4–11.3)

## 2025-02-10 PROCEDURE — 85025 COMPLETE CBC W/AUTO DIFF WBC: CPT

## 2025-02-10 PROCEDURE — 3079F DIAST BP 80-89 MM HG: CPT | Performed by: INTERNAL MEDICINE

## 2025-02-10 PROCEDURE — 87340 HEPATITIS B SURFACE AG IA: CPT

## 2025-02-10 PROCEDURE — 36415 COLL VENOUS BLD VENIPUNCTURE: CPT

## 2025-02-10 PROCEDURE — 3077F SYST BP >= 140 MM HG: CPT | Performed by: INTERNAL MEDICINE

## 2025-02-10 PROCEDURE — 99417 PROLNG OP E/M EACH 15 MIN: CPT | Performed by: INTERNAL MEDICINE

## 2025-02-10 PROCEDURE — 3008F BODY MASS INDEX DOCD: CPT | Performed by: INTERNAL MEDICINE

## 2025-02-10 PROCEDURE — 99215 OFFICE O/P EST HI 40 MIN: CPT | Mod: 25 | Performed by: INTERNAL MEDICINE

## 2025-02-10 PROCEDURE — 99205 OFFICE O/P NEW HI 60 MIN: CPT | Performed by: INTERNAL MEDICINE

## 2025-02-10 PROCEDURE — 86704 HEP B CORE ANTIBODY TOTAL: CPT

## 2025-02-10 PROCEDURE — 86706 HEP B SURFACE ANTIBODY: CPT

## 2025-02-10 RX ORDER — EPINEPHRINE 0.3 MG/.3ML
0.3 INJECTION SUBCUTANEOUS EVERY 5 MIN PRN
OUTPATIENT
Start: 2025-02-18

## 2025-02-10 RX ORDER — PALONOSETRON 0.05 MG/ML
250 INJECTION, SOLUTION INTRAVENOUS ONCE
OUTPATIENT
Start: 2025-02-20

## 2025-02-10 RX ORDER — FAMOTIDINE 10 MG/ML
20 INJECTION INTRAVENOUS ONCE AS NEEDED
OUTPATIENT
Start: 2025-03-04

## 2025-02-10 RX ORDER — HEPARIN 100 UNIT/ML
500 SYRINGE INTRAVENOUS AS NEEDED
OUTPATIENT
Start: 2025-02-10

## 2025-02-10 RX ORDER — LORAZEPAM 2 MG/ML
1 INJECTION INTRAMUSCULAR AS NEEDED
OUTPATIENT
Start: 2025-03-04

## 2025-02-10 RX ORDER — ALBUTEROL SULFATE 0.83 MG/ML
3 SOLUTION RESPIRATORY (INHALATION) AS NEEDED
OUTPATIENT
Start: 2025-03-06

## 2025-02-10 RX ORDER — FAMOTIDINE 10 MG/ML
20 INJECTION INTRAVENOUS ONCE AS NEEDED
OUTPATIENT
Start: 2025-02-18

## 2025-02-10 RX ORDER — EPINEPHRINE 0.3 MG/.3ML
0.3 INJECTION SUBCUTANEOUS EVERY 5 MIN PRN
OUTPATIENT
Start: 2025-03-06

## 2025-02-10 RX ORDER — PROCHLORPERAZINE EDISYLATE 5 MG/ML
10 INJECTION INTRAMUSCULAR; INTRAVENOUS EVERY 6 HOURS PRN
OUTPATIENT
Start: 2025-03-04

## 2025-02-10 RX ORDER — ATROPINE SULFATE 0.1 MG/ML
0.4 INJECTION INTRAVENOUS ONCE
OUTPATIENT
Start: 2025-03-04

## 2025-02-10 RX ORDER — ATROPINE SULFATE 0.4 MG/ML
0.4 INJECTION, SOLUTION ENDOTRACHEAL; INTRAMEDULLARY; INTRAMUSCULAR; INTRAVENOUS; SUBCUTANEOUS
OUTPATIENT
Start: 2025-03-04

## 2025-02-10 RX ORDER — ALBUTEROL SULFATE 0.83 MG/ML
3 SOLUTION RESPIRATORY (INHALATION) AS NEEDED
OUTPATIENT
Start: 2025-02-20

## 2025-02-10 RX ORDER — LORAZEPAM 2 MG/ML
1 INJECTION INTRAMUSCULAR AS NEEDED
OUTPATIENT
Start: 2025-02-18

## 2025-02-10 RX ORDER — FAMOTIDINE 10 MG/ML
20 INJECTION INTRAVENOUS ONCE AS NEEDED
OUTPATIENT
Start: 2025-02-20

## 2025-02-10 RX ORDER — DIPHENHYDRAMINE HCL 25 MG
50 CAPSULE ORAL NIGHTLY PRN
OUTPATIENT
Start: 2025-02-18

## 2025-02-10 RX ORDER — LOPERAMIDE HYDROCHLORIDE 2 MG/1
CAPSULE ORAL
Qty: 100 CAPSULE | Refills: 2 | Status: SHIPPED | OUTPATIENT
Start: 2025-02-10

## 2025-02-10 RX ORDER — ALBUTEROL SULFATE 0.83 MG/ML
3 SOLUTION RESPIRATORY (INHALATION) AS NEEDED
OUTPATIENT
Start: 2025-02-18

## 2025-02-10 RX ORDER — DIPHENHYDRAMINE HYDROCHLORIDE 50 MG/ML
50 INJECTION INTRAMUSCULAR; INTRAVENOUS AS NEEDED
OUTPATIENT
Start: 2025-02-18

## 2025-02-10 RX ORDER — EPINEPHRINE 0.3 MG/.3ML
0.3 INJECTION SUBCUTANEOUS EVERY 5 MIN PRN
OUTPATIENT
Start: 2025-03-04

## 2025-02-10 RX ORDER — PROCHLORPERAZINE MALEATE 10 MG
10 TABLET ORAL EVERY 6 HOURS PRN
OUTPATIENT
Start: 2025-02-18

## 2025-02-10 RX ORDER — DIPHENHYDRAMINE HYDROCHLORIDE 50 MG/ML
50 INJECTION INTRAMUSCULAR; INTRAVENOUS AS NEEDED
OUTPATIENT
Start: 2025-02-20

## 2025-02-10 RX ORDER — PALONOSETRON 0.05 MG/ML
250 INJECTION, SOLUTION INTRAVENOUS ONCE
OUTPATIENT
Start: 2025-03-06

## 2025-02-10 RX ORDER — PROCHLORPERAZINE MALEATE 10 MG
10 TABLET ORAL EVERY 6 HOURS PRN
Qty: 30 TABLET | Refills: 5 | Status: SHIPPED | OUTPATIENT
Start: 2025-02-10

## 2025-02-10 RX ORDER — DIPHENHYDRAMINE HYDROCHLORIDE 50 MG/ML
50 INJECTION INTRAMUSCULAR; INTRAVENOUS AS NEEDED
OUTPATIENT
Start: 2025-03-04

## 2025-02-10 RX ORDER — PROCHLORPERAZINE MALEATE 10 MG
10 TABLET ORAL EVERY 6 HOURS PRN
OUTPATIENT
Start: 2025-03-04

## 2025-02-10 RX ORDER — PROCHLORPERAZINE EDISYLATE 5 MG/ML
10 INJECTION INTRAMUSCULAR; INTRAVENOUS EVERY 6 HOURS PRN
OUTPATIENT
Start: 2025-02-18

## 2025-02-10 RX ORDER — FAMOTIDINE 10 MG/ML
20 INJECTION INTRAVENOUS ONCE AS NEEDED
OUTPATIENT
Start: 2025-03-06

## 2025-02-10 RX ORDER — ATROPINE SULFATE 0.4 MG/ML
0.4 INJECTION, SOLUTION ENDOTRACHEAL; INTRAMEDULLARY; INTRAMUSCULAR; INTRAVENOUS; SUBCUTANEOUS
OUTPATIENT
Start: 2025-02-18

## 2025-02-10 RX ORDER — HEPARIN SODIUM,PORCINE/PF 10 UNIT/ML
50 SYRINGE (ML) INTRAVENOUS AS NEEDED
OUTPATIENT
Start: 2025-02-10

## 2025-02-10 RX ORDER — ALBUTEROL SULFATE 0.83 MG/ML
3 SOLUTION RESPIRATORY (INHALATION) AS NEEDED
OUTPATIENT
Start: 2025-03-04

## 2025-02-10 RX ORDER — DIPHENHYDRAMINE HYDROCHLORIDE 50 MG/ML
50 INJECTION INTRAMUSCULAR; INTRAVENOUS AS NEEDED
OUTPATIENT
Start: 2025-03-06

## 2025-02-10 RX ORDER — DIPHENHYDRAMINE HCL 25 MG
50 CAPSULE ORAL NIGHTLY PRN
OUTPATIENT
Start: 2025-03-04

## 2025-02-10 RX ORDER — ONDANSETRON HYDROCHLORIDE 8 MG/1
8 TABLET, FILM COATED ORAL EVERY 8 HOURS PRN
Qty: 30 TABLET | Refills: 5 | Status: SHIPPED | OUTPATIENT
Start: 2025-02-10

## 2025-02-10 RX ORDER — ATROPINE SULFATE 0.1 MG/ML
0.4 INJECTION INTRAVENOUS ONCE
OUTPATIENT
Start: 2025-02-18

## 2025-02-10 RX ORDER — EPINEPHRINE 0.3 MG/.3ML
0.3 INJECTION SUBCUTANEOUS EVERY 5 MIN PRN
OUTPATIENT
Start: 2025-02-20

## 2025-02-10 ASSESSMENT — PAIN SCALES - GENERAL: PAINLEVEL_OUTOF10: 7

## 2025-02-10 ASSESSMENT — NCCN CANCER DISTRESS MANAGEMENT
NCCN PHYSICAL CONCERNS: 2
NCCN PHYSICAL CONCERNS: 4
NCCN EMOTIONAL CONCERNS: 1
NCCN PHYSICAL CONCERNS: 8
NCCN PHYSICAL CONCERNS: 1

## 2025-02-10 ASSESSMENT — ENCOUNTER SYMPTOMS
OCCASIONAL FEELINGS OF UNSTEADINESS: 0
DEPRESSION: 0
LOSS OF SENSATION IN FEET: 0

## 2025-02-10 ASSESSMENT — PATIENT HEALTH QUESTIONNAIRE - PHQ9
1. LITTLE INTEREST OR PLEASURE IN DOING THINGS: NOT AT ALL
2. FEELING DOWN, DEPRESSED OR HOPELESS: NOT AT ALL
SUM OF ALL RESPONSES TO PHQ9 QUESTIONS 1 AND 2: 0

## 2025-02-10 NOTE — PROGRESS NOTES
Met with patient and his wife after clinic visit with Dr. Mesa to provide education about upcoming treatment plan. Medication handouts and copy of signed consent provided on planned treatment: FOLFIRINOX. Plan for port placement on 2/12/24 and start of treatment on 2/18/24. We reviewed upcoming schedule of treatment days, supportive care visits with NP and PF/labs prior to cycle 2. We discussed management and monitoring of possible side effects from treatment, written handouts provided. Reviewed take-home medications and how to appropriately use them as needed. Reviewed handouts for cold sensitivity with oxaliplatin and safe handling of body fluids while on treatment. Education red bag and binder on pancreatic cancer given to patient. We discussed what to expect at infusion/clinic visits, handouts provided. Handouts given on fever management. Pt currently experiencing constipation, last bowel movement Saturday 2/8 - Dr. Mesa instructed to start miralax - I provided education on how to take.     Community and support resources provided, handout for gathering place given to pt. Pt introduced to role of social work and dietitian, plan to meet with both during treatment next week.     Office contact information provided. We discussed office hours and on-call information. Encouraged patient to call office with any concerns or questions.

## 2025-02-11 LAB
HBV CORE AB SER QL: NONREACTIVE
HBV SURFACE AB SER-ACNC: 3.9 MIU/ML
HBV SURFACE AG SERPL QL IA: NONREACTIVE

## 2025-02-12 ENCOUNTER — HOSPITAL ENCOUNTER (OUTPATIENT)
Dept: RADIOLOGY | Facility: HOSPITAL | Age: 60
Discharge: HOME | End: 2025-02-12
Payer: COMMERCIAL

## 2025-02-12 VITALS
SYSTOLIC BLOOD PRESSURE: 132 MMHG | HEIGHT: 69 IN | RESPIRATION RATE: 12 BRPM | WEIGHT: 205 LBS | HEART RATE: 71 BPM | TEMPERATURE: 97.5 F | DIASTOLIC BLOOD PRESSURE: 74 MMHG | BODY MASS INDEX: 30.36 KG/M2 | OXYGEN SATURATION: 99 %

## 2025-02-12 DIAGNOSIS — C25.9 ADENOCARCINOMA OF PANCREAS (MULTI): ICD-10-CM

## 2025-02-12 PROCEDURE — 99153 MOD SED SAME PHYS/QHP EA: CPT

## 2025-02-12 PROCEDURE — 2500000005 HC RX 250 GENERAL PHARMACY W/O HCPCS: Performed by: RADIOLOGY

## 2025-02-12 PROCEDURE — 99152 MOD SED SAME PHYS/QHP 5/>YRS: CPT | Performed by: RADIOLOGY

## 2025-02-12 PROCEDURE — 2500000004 HC RX 250 GENERAL PHARMACY W/ HCPCS (ALT 636 FOR OP/ED): Performed by: STUDENT IN AN ORGANIZED HEALTH CARE EDUCATION/TRAINING PROGRAM

## 2025-02-12 PROCEDURE — 2780000003 HC OR 278 NO HCPCS

## 2025-02-12 PROCEDURE — 99153 MOD SED SAME PHYS/QHP EA: CPT | Performed by: RADIOLOGY

## 2025-02-12 PROCEDURE — 36010 PLACE CATHETER IN VEIN: CPT | Mod: RT | Performed by: RADIOLOGY

## 2025-02-12 PROCEDURE — 99152 MOD SED SAME PHYS/QHP 5/>YRS: CPT

## 2025-02-12 PROCEDURE — 2720000007 HC OR 272 NO HCPCS

## 2025-02-12 PROCEDURE — 76942 ECHO GUIDE FOR BIOPSY: CPT | Mod: RT | Performed by: RADIOLOGY

## 2025-02-12 PROCEDURE — 2500000004 HC RX 250 GENERAL PHARMACY W/ HCPCS (ALT 636 FOR OP/ED): Performed by: RADIOLOGY

## 2025-02-12 PROCEDURE — 7100000009 HC PHASE TWO TIME - INITIAL BASE CHARGE

## 2025-02-12 PROCEDURE — 36561 INSERT TUNNELED CV CATH: CPT | Mod: RT | Performed by: RADIOLOGY

## 2025-02-12 PROCEDURE — 7100000010 HC PHASE TWO TIME - EACH INCREMENTAL 1 MINUTE

## 2025-02-12 PROCEDURE — C1788 PORT, INDWELLING, IMP: HCPCS

## 2025-02-12 RX ORDER — LIDOCAINE HYDROCHLORIDE AND EPINEPHRINE 10; 10 UG/ML; MG/ML
INJECTION, SOLUTION INFILTRATION; PERINEURAL
Status: COMPLETED | OUTPATIENT
Start: 2025-02-12 | End: 2025-02-12

## 2025-02-12 RX ORDER — HEPARIN 100 UNIT/ML
SYRINGE INTRAVENOUS
Status: COMPLETED | OUTPATIENT
Start: 2025-02-12 | End: 2025-02-12

## 2025-02-12 RX ORDER — MIDAZOLAM HYDROCHLORIDE 1 MG/ML
INJECTION, SOLUTION INTRAMUSCULAR; INTRAVENOUS
Status: COMPLETED | OUTPATIENT
Start: 2025-02-12 | End: 2025-02-12

## 2025-02-12 RX ORDER — FENTANYL CITRATE 50 UG/ML
INJECTION, SOLUTION INTRAMUSCULAR; INTRAVENOUS
Status: COMPLETED | OUTPATIENT
Start: 2025-02-12 | End: 2025-02-12

## 2025-02-12 RX ADMIN — LIDOCAINE HYDROCHLORIDE,EPINEPHRINE BITARTRATE 10 ML: 10; .01 INJECTION, SOLUTION INFILTRATION; PERINEURAL at 14:58

## 2025-02-12 RX ADMIN — Medication 3 L/MIN: at 14:36

## 2025-02-12 RX ADMIN — Medication 5 ML: at 15:10

## 2025-02-12 RX ADMIN — LIDOCAINE HYDROCHLORIDE,EPINEPHRINE BITARTRATE 6 ML: 10; .01 INJECTION, SOLUTION INFILTRATION; PERINEURAL at 15:00

## 2025-02-12 RX ADMIN — CEFAZOLIN SODIUM 2 G: 1 POWDER, FOR SOLUTION INTRAMUSCULAR; INTRAVENOUS at 14:39

## 2025-02-12 RX ADMIN — FENTANYL CITRATE 50 MCG: 50 INJECTION, SOLUTION INTRAMUSCULAR; INTRAVENOUS at 14:52

## 2025-02-12 RX ADMIN — LIDOCAINE HYDROCHLORIDE,EPINEPHRINE BITARTRATE 5 ML: 10; .01 INJECTION, SOLUTION INFILTRATION; PERINEURAL at 14:53

## 2025-02-12 RX ADMIN — MIDAZOLAM 1 MG: 1 INJECTION INTRAMUSCULAR; INTRAVENOUS at 14:52

## 2025-02-12 ASSESSMENT — PAIN SCALES - GENERAL
PAINLEVEL_OUTOF10: 0 - NO PAIN
PAINLEVEL_OUTOF10: 7
PAINLEVEL_OUTOF10: 0 - NO PAIN

## 2025-02-12 ASSESSMENT — PAIN - FUNCTIONAL ASSESSMENT
PAIN_FUNCTIONAL_ASSESSMENT: 0-10
PAIN_FUNCTIONAL_ASSESSMENT: 0-10

## 2025-02-12 NOTE — DISCHARGE INSTRUCTIONS
No driving or operating machinery/power tools for 24 hours.   No alcohol for 24 hours.  No important decision-making or signing of legal documents for 24 hours.   No shower for 48 hours. After 48 hours may shower and get the incisions wet, however avoid direct water stream on the incisions until healed. Wash incisions gently with soap, rinse, pat dry and leave open to air. Leave steri-strips and skin glue in place as they will fall off on their own over 1-3 weeks.   Avoid lifting or movements that pull or stretch the skin/muscle around the incisions until they are healed.   [facility] [employer] is ready for immediate use.   Please carry the [employer] ID card with you.

## 2025-02-12 NOTE — Clinical Note
Right chest mediport placed, Incision sutured; dermabond and steri strips applied to incision and venotomy sites. Site clean and dry, free of bleeding and hematoma. Patient tolerated well; denies pain, nausea or dizziness. Patient to return to [facility] for recovery via RN transport.

## 2025-02-12 NOTE — PRE-PROCEDURE NOTE
"INTERVENTIONAL RADIOLOGY PRE-PROCEDURE NOTE    Sathish Iqbal \"Hardy\" is a 59 y.o. male with PMHx of pancreatic adenocarcinoma who presents to the interventional radiology department for mediport placement.    Procedure: mediport placement    Indication for procedure: The encounter diagnosis was Adenocarcinoma of pancreas (Multi).    Past Medical History:   Diagnosis Date    Hyperlipidemia     RCA occlusion (Multi) 06/22/2023      Past Surgical History:   Procedure Laterality Date    OTHER SURGICAL HISTORY  04/16/2021    No history of surgery       Relevant Labs:   Lab Results   Component Value Date    CREATININE 0.80 01/23/2025    EGFR >90 01/23/2025    INR 1.1 01/23/2025    PROTIME 12.3 01/23/2025       Planned Sedation/Anesthesia: Moderate    Directed physical examination:    General: Normal appearance, behavior, cognition and NAD  Heart: Heart regular rate and rhythm  Lungs: No increased work of breathing  Abdomen: soft and nontender  Psych: oriented to time, place and person      Current Outpatient Medications:     aspirin 81 mg chewable tablet, CHEW AND SWALLOW ONE TABLET BY MOUTH DAILY, Disp: 90 tablet, Rfl: 3    atorvastatin (Lipitor) 40 mg tablet, Take 1 tablet (40 mg) by mouth once daily at bedtime., Disp: 90 tablet, Rfl: 3    clopidogrel (Plavix) 75 mg tablet, Take 1 tablet (75 mg) by mouth once daily., Disp: 90 tablet, Rfl: 3    loperamide (Imodium) 2 mg capsule, Take 2 capsules (4 mg) by mouth with the first episode of diarrhea and 1 capsule (2 mg) by mouth with any additional episodes. Maximum 8 capsules (16 mg) per day., Disp: 100 capsule, Rfl: 2    losartan (Cozaar) 50 mg tablet, Take 1 tablet (50 mg) by mouth once daily., Disp: 90 tablet, Rfl: 3    metoprolol succinate XL (Toprol-XL) 50 mg 24 hr tablet, Take 1 tablet (50 mg) by mouth once daily., Disp: 90 tablet, Rfl: 3    nitroglycerin (Nitrostat) 0.4 mg SL tablet, Place 1 tablet (0.4 mg) under the tongue every 5 minutes if needed for chest pain., " Disp: 25 tablet, Rfl: 5    ondansetron (Zofran) 8 mg tablet, Take 1 tablet (8 mg) by mouth every 8 hours if needed for nausea or vomiting. (Patient not taking: Reported on 2/12/2025), Disp: 30 tablet, Rfl: 5    prochlorperazine (Compazine) 10 mg tablet, Take 1 tablet (10 mg) by mouth every 6 hours if needed for nausea or vomiting. (Patient not taking: Reported on 2/12/2025), Disp: 30 tablet, Rfl: 5    sildenafil (Viagra) 50 mg tablet, Take 1 tablet (50 mg) by mouth if needed. (Patient not taking: Reported on 2/12/2025), Disp: , Rfl:   No current facility-administered medications for this encounter.     Mallampati: II (hard and soft palate, upper portion of tonsils anduvula visible)    ASA Score: ASA 3 - Patient with moderate systemic disease with functional limitations    Benefits, risks and alternatives of procedure and planned sedation have been discussed with the patient and/or their representative. All questions answered and they agree to proceed.     Sebastián Andrew MD, PGY-6  Vascular & Interventional Radiology  IR pager: [phone]    NON-Urgent on call weekends and after hours weekdays (5pm - 5am) IR pager: [phone]  Urgent & emergent on call weekends and after hours weekdays (5pm-7am) IR pager: [phone]

## 2025-02-12 NOTE — POST-PROCEDURE NOTE
Interventional Radiology Post-Procedure Note    Right internal jugular mediport placement    Procedure Details:  Technically successful and uncomplicated right internal jugular mediport placement.  Please see PACS for full procedural details.    Patient Tolerance: good  Complications: None    Indication for procedure: The encounter diagnosis was Adenocarcinoma of pancreas (Multi).    Pre-Procedure Verification and Time Out:  · Procedure Location procedure area   · HUDDLE - Pre-procedure Verification completed   · TIME OUT - Final Verification completed immediately prior to procedure start   · DEBRIEF completed     General Information:  Date/Time of Procedure: 02/12/25 at 3:50 PM  Indication(s): pancreatic adenocarcinoma  Findings: See PACS  Procedure performed by: Sebastián Andrew MD   Assistant(s): Dr. Javier Barrera MD  Estimated Blood Loss (mL): minimal  Specimen: No  Informed Consent: written consent obtained    Prep:  Ultrasound Guided Insertion: Yes  Large Drape, Hand Hygiene, Surgical Cap, Surgical Mask, Sterile Gown, Sterile Gloves, Glasses, and Scrubs  Patient Position: Supine  Site Prep: chlorhexidine, draped, usual sterile procedure followed    Anesthesia/Medications:  Procedural Sedation:  Fentanyl: 50 mcg  Versed: 1 mg  1% Lidocaine with Epinephrine: 21 mL    Sebastián Andrew MD, PGY-6  Interventional Radiology  IR pager: [phone]    NON-Urgent on call weekends and after hours weekdays (5pm - 5am) IR pager: [phone]  Urgent & emergent on call weekends and after hours weekdays (5pm-7am) IR pager: [phone]

## 2025-02-12 NOTE — NURSING NOTE
Ate 100% of meal tray and tolerated well. Out of bed independently with steady gait. Ambulated to BR and back. Voided in BR.

## 2025-02-12 NOTE — NURSING NOTE
Discharge instructions given and reviewed with patient. Mediport pamphlet w/ID card given/reviewed with patient. Patient's sig other present for discharge instructions. Discharged to private vehicle via ambulatory status with steady gait, accompanied by his sig other and escorted to OP lobby door by cath lab RN.

## 2025-02-13 ENCOUNTER — APPOINTMENT (OUTPATIENT)
Dept: HEMATOLOGY/ONCOLOGY | Facility: CLINIC | Age: 60
End: 2025-02-13
Payer: COMMERCIAL

## 2025-02-17 ENCOUNTER — APPOINTMENT (OUTPATIENT)
Dept: HEMATOLOGY/ONCOLOGY | Facility: CLINIC | Age: 60
End: 2025-02-17
Payer: COMMERCIAL

## 2025-02-17 DIAGNOSIS — K86.89 MASS OF HEAD OF PANCREAS (HHS-HCC): ICD-10-CM

## 2025-02-17 DIAGNOSIS — C25.9 ADENOCARCINOMA OF PANCREAS (MULTI): Primary | ICD-10-CM

## 2025-02-17 RX ORDER — ALBUTEROL SULFATE 0.83 MG/ML
3 SOLUTION RESPIRATORY (INHALATION) AS NEEDED
OUTPATIENT
Start: 2025-03-04

## 2025-02-17 RX ORDER — LORAZEPAM 2 MG/ML
1 INJECTION INTRAMUSCULAR AS NEEDED
Status: CANCELLED | OUTPATIENT
Start: 2025-02-18

## 2025-02-17 RX ORDER — DIPHENHYDRAMINE HCL 25 MG
50 CAPSULE ORAL ONCE
Status: CANCELLED | OUTPATIENT
Start: 2025-02-18

## 2025-02-17 RX ORDER — FAMOTIDINE 10 MG/ML
20 INJECTION, SOLUTION INTRAVENOUS ONCE AS NEEDED
Status: CANCELLED | OUTPATIENT
Start: 2025-02-20

## 2025-02-17 RX ORDER — PROCHLORPERAZINE EDISYLATE 5 MG/ML
10 INJECTION INTRAMUSCULAR; INTRAVENOUS EVERY 6 HOURS PRN
OUTPATIENT
Start: 2025-03-04

## 2025-02-17 RX ORDER — EPINEPHRINE 0.3 MG/.3ML
0.3 INJECTION SUBCUTANEOUS EVERY 5 MIN PRN
Status: CANCELLED | OUTPATIENT
Start: 2025-02-20

## 2025-02-17 RX ORDER — DIPHENHYDRAMINE HYDROCHLORIDE 50 MG/ML
50 INJECTION INTRAMUSCULAR; INTRAVENOUS AS NEEDED
OUTPATIENT
Start: 2025-03-06

## 2025-02-17 RX ORDER — EPINEPHRINE 0.3 MG/.3ML
0.3 INJECTION SUBCUTANEOUS EVERY 5 MIN PRN
OUTPATIENT
Start: 2025-03-06

## 2025-02-17 RX ORDER — EPINEPHRINE 0.3 MG/.3ML
0.3 INJECTION SUBCUTANEOUS EVERY 5 MIN PRN
OUTPATIENT
Start: 2025-03-04

## 2025-02-17 RX ORDER — ATROPINE SULFATE 0.4 MG/ML
0.4 INJECTION, SOLUTION ENDOTRACHEAL; INTRAMEDULLARY; INTRAMUSCULAR; INTRAVENOUS; SUBCUTANEOUS
OUTPATIENT
Start: 2025-03-04

## 2025-02-17 RX ORDER — EPINEPHRINE 0.3 MG/.3ML
0.3 INJECTION SUBCUTANEOUS EVERY 5 MIN PRN
Status: CANCELLED | OUTPATIENT
Start: 2025-02-18

## 2025-02-17 RX ORDER — DIPHENHYDRAMINE HYDROCHLORIDE 50 MG/ML
50 INJECTION INTRAMUSCULAR; INTRAVENOUS AS NEEDED
Status: CANCELLED | OUTPATIENT
Start: 2025-02-18

## 2025-02-17 RX ORDER — FAMOTIDINE 10 MG/ML
20 INJECTION, SOLUTION INTRAVENOUS ONCE AS NEEDED
Status: CANCELLED | OUTPATIENT
Start: 2025-02-18

## 2025-02-17 RX ORDER — DIPHENHYDRAMINE HCL 25 MG
50 CAPSULE ORAL ONCE
OUTPATIENT
Start: 2025-03-04

## 2025-02-17 RX ORDER — DIPHENHYDRAMINE HYDROCHLORIDE 50 MG/ML
50 INJECTION INTRAMUSCULAR; INTRAVENOUS AS NEEDED
Status: CANCELLED | OUTPATIENT
Start: 2025-02-20

## 2025-02-17 RX ORDER — DIPHENHYDRAMINE HYDROCHLORIDE 50 MG/ML
50 INJECTION INTRAMUSCULAR; INTRAVENOUS AS NEEDED
OUTPATIENT
Start: 2025-03-04

## 2025-02-17 RX ORDER — ATROPINE SULFATE 0.4 MG/ML
0.4 INJECTION, SOLUTION ENDOTRACHEAL; INTRAMEDULLARY; INTRAMUSCULAR; INTRAVENOUS; SUBCUTANEOUS
Status: CANCELLED | OUTPATIENT
Start: 2025-02-18

## 2025-02-17 RX ORDER — FAMOTIDINE 10 MG/ML
20 INJECTION, SOLUTION INTRAVENOUS ONCE AS NEEDED
OUTPATIENT
Start: 2025-03-06

## 2025-02-17 RX ORDER — PALONOSETRON 0.05 MG/ML
250 INJECTION, SOLUTION INTRAVENOUS ONCE
Status: CANCELLED | OUTPATIENT
Start: 2025-02-20

## 2025-02-17 RX ORDER — PALONOSETRON 0.05 MG/ML
250 INJECTION, SOLUTION INTRAVENOUS ONCE
OUTPATIENT
Start: 2025-03-06

## 2025-02-17 RX ORDER — ALBUTEROL SULFATE 0.83 MG/ML
3 SOLUTION RESPIRATORY (INHALATION) AS NEEDED
Status: CANCELLED | OUTPATIENT
Start: 2025-02-18

## 2025-02-17 RX ORDER — LORAZEPAM 2 MG/ML
1 INJECTION INTRAMUSCULAR AS NEEDED
OUTPATIENT
Start: 2025-03-04

## 2025-02-17 RX ORDER — PROCHLORPERAZINE MALEATE 10 MG
10 TABLET ORAL EVERY 6 HOURS PRN
Status: CANCELLED | OUTPATIENT
Start: 2025-02-18

## 2025-02-17 RX ORDER — PROCHLORPERAZINE EDISYLATE 5 MG/ML
10 INJECTION INTRAMUSCULAR; INTRAVENOUS EVERY 6 HOURS PRN
Status: CANCELLED | OUTPATIENT
Start: 2025-02-18

## 2025-02-17 RX ORDER — PALONOSETRON 0.05 MG/ML
0.25 INJECTION, SOLUTION INTRAVENOUS ONCE
Status: CANCELLED | OUTPATIENT
Start: 2025-02-17

## 2025-02-17 RX ORDER — PROCHLORPERAZINE MALEATE 10 MG
10 TABLET ORAL EVERY 6 HOURS PRN
OUTPATIENT
Start: 2025-03-04

## 2025-02-17 RX ORDER — ALBUTEROL SULFATE 0.83 MG/ML
3 SOLUTION RESPIRATORY (INHALATION) AS NEEDED
OUTPATIENT
Start: 2025-03-06

## 2025-02-17 RX ORDER — ATROPINE SULFATE 0.1 MG/ML
0.4 INJECTION INTRAVENOUS ONCE
OUTPATIENT
Start: 2025-03-04

## 2025-02-17 RX ORDER — ATROPINE SULFATE 0.1 MG/ML
0.4 INJECTION INTRAVENOUS ONCE
Status: CANCELLED | OUTPATIENT
Start: 2025-02-18

## 2025-02-17 RX ORDER — FAMOTIDINE 10 MG/ML
20 INJECTION, SOLUTION INTRAVENOUS ONCE AS NEEDED
OUTPATIENT
Start: 2025-03-04

## 2025-02-17 RX ORDER — ALBUTEROL SULFATE 0.83 MG/ML
3 SOLUTION RESPIRATORY (INHALATION) AS NEEDED
Status: CANCELLED | OUTPATIENT
Start: 2025-02-20

## 2025-02-18 ENCOUNTER — INFUSION (OUTPATIENT)
Dept: HEMATOLOGY/ONCOLOGY | Facility: CLINIC | Age: 60
End: 2025-02-18
Payer: COMMERCIAL

## 2025-02-18 ENCOUNTER — NUTRITION (OUTPATIENT)
Dept: HEMATOLOGY/ONCOLOGY | Facility: CLINIC | Age: 60
End: 2025-02-18
Payer: COMMERCIAL

## 2025-02-18 ENCOUNTER — APPOINTMENT (OUTPATIENT)
Dept: HEMATOLOGY/ONCOLOGY | Facility: CLINIC | Age: 60
End: 2025-02-18
Payer: COMMERCIAL

## 2025-02-18 VITALS
OXYGEN SATURATION: 99 % | RESPIRATION RATE: 16 BRPM | BODY MASS INDEX: 29.32 KG/M2 | HEART RATE: 86 BPM | TEMPERATURE: 96.3 F | SYSTOLIC BLOOD PRESSURE: 113 MMHG | DIASTOLIC BLOOD PRESSURE: 72 MMHG | HEIGHT: 69 IN | WEIGHT: 197.97 LBS

## 2025-02-18 VITALS — BODY MASS INDEX: 29.32 KG/M2 | HEIGHT: 69 IN | WEIGHT: 197.97 LBS

## 2025-02-18 DIAGNOSIS — K86.89 MASS OF HEAD OF PANCREAS (HHS-HCC): ICD-10-CM

## 2025-02-18 DIAGNOSIS — G89.3 CANCER RELATED PAIN: ICD-10-CM

## 2025-02-18 DIAGNOSIS — C25.9 ADENOCARCINOMA OF PANCREAS (MULTI): Primary | ICD-10-CM

## 2025-02-18 DIAGNOSIS — C25.9 ADENOCARCINOMA OF PANCREAS (MULTI): ICD-10-CM

## 2025-02-18 LAB
ALBUMIN SERPL BCP-MCNC: 4.3 G/DL (ref 3.4–5)
ALP SERPL-CCNC: 63 U/L (ref 33–136)
ALT SERPL W P-5'-P-CCNC: 25 U/L (ref 10–52)
ANION GAP SERPL CALC-SCNC: 12 MMOL/L (ref 10–20)
AST SERPL W P-5'-P-CCNC: 16 U/L (ref 9–39)
BILIRUB SERPL-MCNC: 0.6 MG/DL (ref 0–1.2)
BUN SERPL-MCNC: 16 MG/DL (ref 6–23)
CALCIUM SERPL-MCNC: 9 MG/DL (ref 8.6–10.3)
CHLORIDE SERPL-SCNC: 102 MMOL/L (ref 98–107)
CO2 SERPL-SCNC: 26 MMOL/L (ref 21–32)
CREAT SERPL-MCNC: 0.66 MG/DL (ref 0.5–1.3)
EGFRCR SERPLBLD CKD-EPI 2021: >90 ML/MIN/1.73M*2
GLUCOSE SERPL-MCNC: 154 MG/DL (ref 74–99)
POTASSIUM SERPL-SCNC: 3.8 MMOL/L (ref 3.5–5.3)
PROT SERPL-MCNC: 6.5 G/DL (ref 6.4–8.2)
SODIUM SERPL-SCNC: 136 MMOL/L (ref 136–145)

## 2025-02-18 PROCEDURE — 96416 CHEMO PROLONG INFUSE W/PUMP: CPT

## 2025-02-18 PROCEDURE — 96375 TX/PRO/DX INJ NEW DRUG ADDON: CPT | Mod: INF

## 2025-02-18 PROCEDURE — 96413 CHEMO IV INFUSION 1 HR: CPT

## 2025-02-18 PROCEDURE — 2500000004 HC RX 250 GENERAL PHARMACY W/ HCPCS (ALT 636 FOR OP/ED): Performed by: INTERNAL MEDICINE

## 2025-02-18 PROCEDURE — 81232 DPYD GENE COMMON VARIANTS: CPT

## 2025-02-18 PROCEDURE — 2500000001 HC RX 250 WO HCPCS SELF ADMINISTERED DRUGS (ALT 637 FOR MEDICARE OP): Performed by: INTERNAL MEDICINE

## 2025-02-18 PROCEDURE — 80053 COMPREHEN METABOLIC PANEL: CPT

## 2025-02-18 PROCEDURE — 96415 CHEMO IV INFUSION ADDL HR: CPT

## 2025-02-18 PROCEDURE — 96417 CHEMO IV INFUS EACH ADDL SEQ: CPT

## 2025-02-18 RX ORDER — HEPARIN 100 UNIT/ML
500 SYRINGE INTRAVENOUS AS NEEDED
Status: CANCELLED | OUTPATIENT
Start: 2025-02-18

## 2025-02-18 RX ORDER — FAMOTIDINE 10 MG/ML
20 INJECTION, SOLUTION INTRAVENOUS ONCE AS NEEDED
Status: DISCONTINUED | OUTPATIENT
Start: 2025-02-18 | End: 2025-02-18 | Stop reason: HOSPADM

## 2025-02-18 RX ORDER — HEPARIN SODIUM,PORCINE/PF 10 UNIT/ML
50 SYRINGE (ML) INTRAVENOUS AS NEEDED
Status: CANCELLED | OUTPATIENT
Start: 2025-02-18

## 2025-02-18 RX ORDER — DEXAMETHASONE 6 MG/1
12 TABLET ORAL ONCE
Status: COMPLETED | OUTPATIENT
Start: 2025-02-18 | End: 2025-02-18

## 2025-02-18 RX ORDER — PANCRELIPASE 24000; 76000; 120000 [USP'U]/1; [USP'U]/1; [USP'U]/1
CAPSULE, DELAYED RELEASE PELLETS ORAL
Qty: 240 CAPSULE | Refills: 6 | Status: SHIPPED | OUTPATIENT
Start: 2025-02-18

## 2025-02-18 RX ORDER — ATROPINE SULFATE 0.4 MG/ML
0.4 INJECTION, SOLUTION ENDOTRACHEAL; INTRAMEDULLARY; INTRAMUSCULAR; INTRAVENOUS; SUBCUTANEOUS ONCE
Status: COMPLETED | OUTPATIENT
Start: 2025-02-18 | End: 2025-02-18

## 2025-02-18 RX ORDER — DIPHENHYDRAMINE HCL 25 MG
50 CAPSULE ORAL ONCE
Status: COMPLETED | OUTPATIENT
Start: 2025-02-18 | End: 2025-02-18

## 2025-02-18 RX ORDER — ONDANSETRON HYDROCHLORIDE 8 MG/1
16 TABLET, FILM COATED ORAL ONCE
Status: COMPLETED | OUTPATIENT
Start: 2025-02-18 | End: 2025-02-18

## 2025-02-18 RX ORDER — DIPHENHYDRAMINE HYDROCHLORIDE 50 MG/ML
50 INJECTION INTRAMUSCULAR; INTRAVENOUS AS NEEDED
Status: DISCONTINUED | OUTPATIENT
Start: 2025-02-18 | End: 2025-02-18 | Stop reason: HOSPADM

## 2025-02-18 RX ORDER — OXYCODONE HYDROCHLORIDE 5 MG/1
5 TABLET ORAL EVERY 4 HOURS PRN
Qty: 30 TABLET | Refills: 0 | Status: SHIPPED | OUTPATIENT
Start: 2025-02-18 | End: 2025-03-20

## 2025-02-18 RX ORDER — ALBUTEROL SULFATE 0.83 MG/ML
3 SOLUTION RESPIRATORY (INHALATION) AS NEEDED
Status: DISCONTINUED | OUTPATIENT
Start: 2025-02-18 | End: 2025-02-18 | Stop reason: HOSPADM

## 2025-02-18 RX ORDER — EPINEPHRINE 0.3 MG/.3ML
0.3 INJECTION SUBCUTANEOUS EVERY 5 MIN PRN
Status: DISCONTINUED | OUTPATIENT
Start: 2025-02-18 | End: 2025-02-18 | Stop reason: HOSPADM

## 2025-02-18 RX ADMIN — OXALIPLATIN 180 MG: 5 INJECTION, SOLUTION INTRAVENOUS at 11:36

## 2025-02-18 RX ADMIN — DIPHENHYDRAMINE HYDROCHLORIDE 50 MG: 25 CAPSULE ORAL at 11:18

## 2025-02-18 RX ADMIN — DEXAMETHASONE 12 MG: 6 TABLET ORAL at 11:18

## 2025-02-18 RX ADMIN — IRINOTECAN HYDROCHLORIDE 320 MG: 20 INJECTION, SOLUTION INTRAVENOUS at 13:54

## 2025-02-18 RX ADMIN — ATROPINE SULFATE 0.4 MG: 0.4 INJECTION, SOLUTION INTRAVENOUS at 13:54

## 2025-02-18 RX ADMIN — FLUOROURACIL 5100 MG: 50 INJECTION, SOLUTION INTRAVENOUS at 15:37

## 2025-02-18 RX ADMIN — ONDANSETRON HYDROCHLORIDE 16 MG: 8 TABLET, FILM COATED ORAL at 11:18

## 2025-02-18 ASSESSMENT — PAIN SCALES - GENERAL: PAINLEVEL_OUTOF10: 6

## 2025-02-18 NOTE — PROGRESS NOTES
"NUTRITION Assessment NOTE    Nutrition Assessment     Reason for Visit:  Sathish Iqbal \"Hardy\" is a 59 y.o. male with pancreatic cancer. He is starting FOLFIRINOX.     Patient Active Problem List   Diagnosis    CAD (coronary artery disease)    Current every day smoker    Diastolic dysfunction    Erectile dysfunction    History of coronary artery stent placement    Hyperlipidemia    NSTEMI, initial episode of care (Multi)    Obesity, Class I, BMI 30-34.9    Subsequent non-ST elevation (NSTEMI) myocardial infarction    Vitamin D deficiency    Hypertension    Encounter for annual physical exam    Depression screen    BMI 33.0-33.9,adult    Adenocarcinoma of pancreas (Multi)       Nutrition Significant Labs:    CMP Trend:    Recent Labs     02/18/25  1003 01/23/25  1000 01/02/25  2111   GLUCOSE 154* 108* 114*    137 135*   K 3.8 4.1 4.0    101 100   CO2 26 28 26   ANIONGAP 12 12 13   BUN 16 12 15   CREATININE 0.66 0.80 0.91   EGFR >90 >90 >90   CALCIUM 9.0 10.0 9.7   ALBUMIN 4.3 4.9 4.8   ALKPHOS 63 77 71   PROT 6.5 7.8 7.7   AST 16 16 17   BILITOT 0.6 0.9 0.7   ALT 25 20 21   , LFT Trend:   Recent Labs     02/18/25  1003 01/23/25  1000 01/02/25  2111   ALBUMIN 4.3 4.9 4.8   BILITOT 0.6 0.9 0.7   ALKPHOS 63 77 71   ALT 25 20 21   AST 16 16 17   PROT 6.5 7.8 7.7     Vitamin D:   Lab Results   Component Value Date    VITD25 19 (A) 04/24/2021        Anthropometrics:  Height: 175.4 cm (5' 9.06\")   Weight: 89.8 kg (197 lb 15.6 oz)   BMI (Calculated): 29.19    IBW/kg (Dietitian Calculated): 72.73 kg Percent of IBW: 123.47 %     Adjusted Body Weight (kg): 77 kg    Weight History:   Daily Weight  02/18/25 : 89.8 kg (197 lb 15.6 oz)  02/18/25 : 89.8 kg (197 lb 15.6 oz)  02/12/25 : 93 kg (205 lb)  02/10/25 : 92.7 kg (204 lb 5.9 oz)  01/29/25 : 98.9 kg (218 lb)  01/23/25 : 97.9 kg (215 lb 12.8 oz)  01/23/25 : 97.8 kg (215 lb 9.8 oz)  01/02/25 : 107 kg (235 lb)  07/23/24 : 106 kg (233 lb 1.6 oz)  05/11/24 : (!) 227 kg " (500 lb 7.1 oz)    Weight Change %:  Weight History / % Weight Change: 30 lb (13%) weight loss x 1 month  Significant Weight Loss: Yes  Interpretation of Weight Loss: >5% in 1 month    Nutrition History:  Food & Nutrition History:  Met with the patient and his wife during first infusion. Patient acknowledged 30 lb weight loss. Attributes this to eating less overall, decreased alcohol intake, and stress/anxiety re: diagnosis. No specific diet recall given today. Patient recently given benadryl and was drowsy. Most of conversation spent with SO.     Patient denies any N/V. Is presently having constipation. He endorses occasional floating stools. Does have some postprandial discomfort after eating. He is interested in pancreatic enzymes. His SO purchased Liquid IV and Ensure to have on hand in case he is unable to eat well after treatment.     Medications:  Current Outpatient Medications   Medication Instructions    aspirin 81 mg, oral, Daily    atorvastatin (LIPITOR) 40 mg, oral, Nightly    clopidogrel (PLAVIX) 75 mg, oral, Daily    loperamide (Imodium) 2 mg capsule Take 2 capsules (4 mg) by mouth with the first episode of diarrhea and 1 capsule (2 mg) by mouth with any additional episodes. Maximum 8 capsules (16 mg) per day.    losartan (COZAAR) 50 mg, oral, Daily    metoprolol succinate XL (TOPROL-XL) 50 mg, oral, Daily    nitroglycerin (NITROSTAT) 0.4 mg, sublingual, Every 5 min PRN    ondansetron (ZOFRAN) 8 mg, oral, Every 8 hours PRN    prochlorperazine (COMPAZINE) 10 mg, oral, Every 6 hours PRN    sildenafil (VIAGRA) 50 mg, As needed       Nutrition Focused Physical Exam Findings:    Subcutaneous Fat Loss:   Orbital Fat Pads: Well nourished (slightly bulging fat pads)  Buccal Fat Pads: Mild-Moderate (flat cheeks, minimal bounce)    Muscle Wasting:  Temporalis: Well nourished (well-defined muscle)  Pectoralis (Clavicular Region): Well nourished (clavicle not visible)    Physical Findings:          Estimated  Needs:       Total Energy Estimated Needs in 24 hours (kCal): 2310 kCal (2306-4331 kcal/day)  Method for Estimating Needs: 30-35 kcal/kg ABW  Total Protein Estimated Needs in 24 Hours (g): 115.5 g ()  Method for Estimating 24 Hour Protein Needs: 1.2-1.5g/kg ABW  Total Fluid Estimated Needs in 24 Hours (mL): 2310 mL (6437-1522 mL/day)  Method for Estimating 24 Hour Fluid Needs: 1 mL/kcal             Nutrition Diagnosis        Nutrition Diagnosis  Patient has Nutrition Diagnosis: Yes  Nutrition Diagnosis 1: Altered GI function  Related to (1): pancreatic cancer  As Evidenced by (1): s/s of pancreatic insufficiency  Additional Assessment Information (1): reports occasional floating stools. Limited PO intake may be masking symptoms  Additional Nutrition Diagnosis: Diagnosis 2  Nutrition Diagnosis 2: Unintended weight loss  Related to (2): diagnosis and stress leading to decreased oral intake  As Evidenced by (2): 30 lb (13%) weight loss x 1 month  Additional Assessment Information (2): at risk for malnutrition if weight loss continues       Nutrition Interventions/Recommendations   Nutrition Prescription: Oral nutrition high protein/high calorie diet with a focus on GI soft foods as needed    Nutrition Interventions:   Food and Nutrient Delivery: Meals & Snacks: Energy-modified diet  Goals: high protein/high calorie diet for weight and LBM maintenance; adjust diet as needed, based on nutrition impact symptoms following treatment. Reviewed how to manage diet based on treatment side effects with patient and SO. Education materials, food lists, and list of additional resources provided.  Medical Food Supplement: Commercial beverage medical food supplement therapy  Goals: utlize high protein/high calorie supplements and/or homemade shakes/smoothies PRN during periods of poor appetite and PO intake. Utilize ORS if having N/V/D/C.     Coordination of Care: Collaboration and referral of nutrition care: Team meeting  involving nutrition professional  Goals: Discussed with team re: Creon. Recommend starting Creon 24,000 lipase units/capsule, 2 with meals and 1 with snacks #240. Reviewed with patient how to take. Education material re: enzymes also provided.     Nutrition Education:   Nutrition Education Content:  Education materials provided.            Nutrition Monitoring and Evaluation   Food and Nutrient Intake  Monitoring and Evaluation Plan: Energy intake, Fluid intake, Protein intake    Anthropometric measurements  Monitoring and Evaluation Plan: Weight  Criteria: weight and LBM maintenance         Nutrition Focused Physical Findings  Monitoring and Evaluation Plan: Digestive System  Criteria: monitor for any adverse GI side effects from treatment and adjust diet as needed  Other: monitor response to Creon 24,000 lipase units/capsule 2 with meals and 1 with snacks       Follow Up: RD contact information provided. This service will continue to follow throughout treatment.

## 2025-02-18 NOTE — SIGNIFICANT EVENT
02/18/25 1037   Prechemo Checklist   Has the patient been in the hospital, ED, or urgent care since last date of service No   Chemo/Immuno Consent Completed and Signed Yes  (2/10/25)   Protocol/Indications Verified Yes   Confirmed to previous date/time of medication N/A  (C1D1 today)   Compared to previous dose N/A  (C1D1 today)   All medications are dated accurately Yes   Pregnancy Test Negative Not applicable   Parameters Met Yes  (2/10 (CBC) - ANC: 7.75, PLTS: 259; 2/18 (CMP) - bili: 0.6, CrCl: greater than 30mL/min)   BSA/Weight-Height Verified Yes   Dose Calculations Verified (current, total, cumulative) Yes

## 2025-02-19 ENCOUNTER — TELEPHONE (OUTPATIENT)
Dept: SURGICAL ONCOLOGY | Facility: HOSPITAL | Age: 60
End: 2025-02-19
Payer: COMMERCIAL

## 2025-02-19 NOTE — TELEPHONE ENCOUNTER
Wife left a message on Dr. Castro voicemail after hours regarding pain med script, hopefully this is already taken care of by your office.   
Opt out

## 2025-02-20 ENCOUNTER — INFUSION (OUTPATIENT)
Dept: HEMATOLOGY/ONCOLOGY | Facility: CLINIC | Age: 60
End: 2025-02-20
Payer: COMMERCIAL

## 2025-02-20 VITALS
DIASTOLIC BLOOD PRESSURE: 84 MMHG | RESPIRATION RATE: 16 BRPM | WEIGHT: 195 LBS | HEART RATE: 95 BPM | SYSTOLIC BLOOD PRESSURE: 117 MMHG | TEMPERATURE: 97.2 F | OXYGEN SATURATION: 99 % | BODY MASS INDEX: 28.75 KG/M2

## 2025-02-20 DIAGNOSIS — C25.9 ADENOCARCINOMA OF PANCREAS (MULTI): ICD-10-CM

## 2025-02-20 LAB
DPYD GENE MUT ANL BLD/T: NORMAL
ELECTRONICALLY SIGNED BY: NORMAL

## 2025-02-20 PROCEDURE — 96375 TX/PRO/DX INJ NEW DRUG ADDON: CPT | Mod: INF

## 2025-02-20 PROCEDURE — 2500000004 HC RX 250 GENERAL PHARMACY W/ HCPCS (ALT 636 FOR OP/ED): Performed by: INTERNAL MEDICINE

## 2025-02-20 PROCEDURE — 96374 THER/PROPH/DIAG INJ IV PUSH: CPT | Mod: INF

## 2025-02-20 RX ORDER — PALONOSETRON 0.05 MG/ML
250 INJECTION, SOLUTION INTRAVENOUS ONCE
Status: COMPLETED | OUTPATIENT
Start: 2025-02-20 | End: 2025-02-20

## 2025-02-20 RX ORDER — HEPARIN SODIUM,PORCINE/PF 10 UNIT/ML
50 SYRINGE (ML) INTRAVENOUS AS NEEDED
OUTPATIENT
Start: 2025-02-20

## 2025-02-20 RX ORDER — HEPARIN 100 UNIT/ML
500 SYRINGE INTRAVENOUS AS NEEDED
OUTPATIENT
Start: 2025-02-20

## 2025-02-20 RX ADMIN — PALONOSETRON HYDROCHLORIDE 250 MCG: 0.25 INJECTION INTRAVENOUS at 14:03

## 2025-02-20 RX ADMIN — DEXAMETHASONE SODIUM PHOSPHATE 8 MG: 4 INJECTION, SOLUTION INTRA-ARTICULAR; INTRALESIONAL; INTRAMUSCULAR; INTRAVENOUS; SOFT TISSUE at 14:06

## 2025-02-20 ASSESSMENT — PAIN SCALES - GENERAL: PAINLEVEL_OUTOF10: 0-NO PAIN

## 2025-02-20 NOTE — PATIENT INSTRUCTIONS
Please do NOT take Zofran (ondansetron) for nausea over the next 2 days. You received Aloxi (palonosetron) as an IV push today and this works the same as Zofran and can have side effects if taken together.     Feel free to take Compazine (prochlorperazine) as prescribed if you are having nausea. Once 48 hrs (2 days) has elapsed, you can also incorporate Zofran into your antinausea regimen, as needed.

## 2025-02-23 LAB
DNA RANGE(S) EXAMINED NAR: NORMAL
GENE DIS ANL INTERP-IMP: POSITIVE
GENE DIS ASSESSED: NORMAL
GENE MUT TESTED BLD/T: 1.1 M/MB
REASON FOR STUDY: NORMAL
TEMPUS GERMLINE NOTE: NORMAL
TEMPUS LCA: NORMAL
TEMPUS MSI NOTE: NORMAL
TEMPUS PORTAL: NORMAL
TEMPUS TREATMENT IMPLICATIONS NOTE: NORMAL
TEMPUS TRIALCOUNT: 3
TEMPUS TRIALMATCHES1: NORMAL
TEMPUS TRIALMATCHES2: NORMAL
TEMPUS TRIALMATCHES3: NORMAL
TEMPUS XR RESULT 1: NORMAL

## 2025-02-24 NOTE — PROGRESS NOTES
"  Patient ID: Sathish Iqbal \"Bill\" is a 59 y.o. male.  Diagnosis:  Pancreatic Cancer  MedOnc: Dr. Mesa  SurgOnc: Sr. Castro   Primary Care Provider: Tamy Pascual MD  Referring Provider: Ashley Mesa MD  [address]  [place] [address]  Visit Type: Follow Up    Date of Service: 02/25/25  Location: [facility]     Patient Care Team:  Tamy Pascual MD as PCP - General  Tamy Pascual MD as PCP - [facility] PCP  Eliezer Puente DO as Cardiologist (Cardiology)  Ashley Mesa MD as Consulting Physician (Hematology and Oncology)    Current Therapy: mFOLFIRINOX     ONCOLOGIC HISTORY     No matching staging information was found for the patient.    Invasive adenocarcinoma of pancreas, MSI-S  Initial CA 19-9 less than 4   1/2/2025: ED for abd pain. CT abdomen pelvis showed pancreatic mass   1/29/2025: biopsy - adenocarcinoma poorly differentiated. MSI status intact  1/23/2025: Saw Dr. Castro   1/24/2025: CT of the chest - no evidence of metastases  2/12/2025: Port placement  2/15/2025: [facility] somatic testing - No reportable pathogenic variants were found  2/18/2025: Cycle 1 FOLFIRINOX     Oncology History   Adenocarcinoma of pancreas (Multi)   2/3/2025 Initial Diagnosis    Adenocarcinoma of pancreas (Multi)     2/18/2025 -  Chemotherapy    mFOLFIRINOX (Fluorouracil Continuous Infusion / Leucovorin / Irinotecan / Oxaliplatin), 14 Day Cycles     7/22/2025 - 7/22/2025 Chemotherapy    mFOLFIRINOX (Fluorouracil Continuous Infusion / Leucovorin / Irinotecan / Oxaliplatin), 14 Day Cycles        Other Contributing History  NSTEMI, CAD, stents, HLD, HTN    Subjective      INTERVAL HISTORY     Sathish Iqbal \"Bill\" is a 59 y.o. male who presents today for follow up of pancreatic adenocarcinoma. Patient of Ashley Mesa MD currently on FOLFIRINOX. Here today for toxicity check. 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 Doing as expected. Some " nausea and constipation. No vomiting. Poor appetite. Lower abdominal pain continues - same since diagnosis - asking for imaging. He has taken a couple antiemetics. He is using dulcolax. Has been doing Creon. Had some dry heaving today after his coffee and started to smoke a cigarette but couldn't finish it. No signs of infection. He would like some IVF today.     Review of Systems   Constitutional:  Positive for appetite change and fatigue. Negative for chills, fever and unexpected weight change.   HENT:  Negative.     Eyes: Negative.    Respiratory: Negative.     Cardiovascular: Negative.    Gastrointestinal:  Positive for abdominal pain, constipation and nausea. Negative for diarrhea and vomiting.   Endocrine: Negative.    Genitourinary: Negative.     Musculoskeletal: Negative.    Skin: Negative.    Neurological: Negative.    Hematological: Negative.    Psychiatric/Behavioral: Negative.         Objective      BP (!) 137/91   Pulse 89   Temp 36.1 °C (97 °F)   Resp 16   Wt 86.8 kg (191 lb 5.8 oz)   SpO2 97%   BMI 28.21 kg/m²   BSA: 2.06 meters squared    Wt Readings from Last 5 Encounters:   02/25/25 86.8 kg (191 lb 5.8 oz)   02/25/25 86.8 kg (191 lb 5.8 oz)   02/20/25 88.5 kg (195 lb)   02/18/25 89.8 kg (197 lb 15.6 oz)   02/18/25 89.8 kg (197 lb 15.6 oz)     Performance Status:  ECOG Score: 0- Fully active, able to carry on all pre-disease performance w/o restriction.  Karnofsky Score: 90 - Able to carry on normal activity; minor signs or symptoms of disease     PHYSICAL EXAM   Physical Exam  Constitutional:       General: He is not in acute distress.     Appearance: Normal appearance. He is not toxic-appearing.   HENT:      Head: Normocephalic and atraumatic.   Eyes:      Pupils: Pupils are equal, round, and reactive to light.   Pulmonary:      Effort: Pulmonary effort is normal.   Musculoskeletal:         General: Normal range of motion.      Cervical back: Normal range of motion.      Right lower leg: No  edema.      Left lower leg: No edema.   Neurological:      General: No focal deficit present.      Mental Status: He is alert and oriented to person, place, and time.      Motor: No weakness.   Psychiatric:         Mood and Affect: Mood normal.         Behavior: Behavior normal.         Thought Content: Thought content normal.         Judgment: Judgment normal.         Allergies  No Known Allergies   Medications  Current Outpatient Medications   Medication Instructions    aspirin 81 mg, oral, Daily    atorvastatin (LIPITOR) 40 mg, oral, Nightly    clopidogrel (PLAVIX) 75 mg, oral, Daily    lipase-protease-amylase (Creon) 24,000-76,000 -120,000 unit capsule Take 2 capsules with meals Take 1 capsule with snacks    loperamide (Imodium) 2 mg capsule Take 2 capsules (4 mg) by mouth with the first episode of diarrhea and 1 capsule (2 mg) by mouth with any additional episodes. Maximum 8 capsules (16 mg) per day.    losartan (COZAAR) 50 mg, oral, Daily    metoprolol succinate XL (TOPROL-XL) 50 mg, oral, Daily    nitroglycerin (NITROSTAT) 0.4 mg, sublingual, Every 5 min PRN    ondansetron (ZOFRAN) 8 mg, oral, Every 8 hours PRN    oxyCODONE (ROXICODONE) 5 mg, oral, Every 4 hours PRN    prochlorperazine (COMPAZINE) 10 mg, oral, Every 6 hours PRN    sildenafil (VIAGRA) 50 mg, As needed        Diagnostic Results   RESULTS   No results found for this or any previous visit (from the past 96 hours).    Recent Imaging     Assessment/Plan   ASSESSMENT     Sathish Iqbal is a 59 y.o. male with invasive adenocarcinoma of pancreas, MSI-S diagnosed in January 2025 after present with abdominal pain who presents in follow up for toxicity check today after his first cycle of neoadjuvant chemotherapy last week. He started neoadjuvant mFOLFIRINOX on 2/18/2025. DPYD Phenotype: Normal Metabolizer, DPYD Activity Score: 2.0. UGT1A1 is pending. Genetics referral was placed and appointment is scheduled in March. Somatic testing completed - no  "pathologic variants.     Patient experiencing expected side effects, no concerning toxicities. Feeling fatigued. Having constipation and nausea. Abdominal pain since diagnosis persists. He is asking for a scan. Explained that it is too soon to scan and the pain is likely related to cancer and will hopefully improve with treatment. We will continue to monitor his pain. He isn't optimizing his antiemetics. We discussed using them more regularly and premedicating before meals. He is taking Creon. He is using Dulcolax when he goes two days without BM. Recommended daily Miralax unless he develops loose stools and Dulcolax as needed if no BM in two days. His regular routine is about 2 times a day. I believe constipation may also be contributing to his nausea and abd pain. He would like IVF today and is scheduled in Infusion - I will give NS Bolus with IV dex and zofran. No need for blood work today.     Briefly reviewed reasoning for genetics consult and explained that genetics counselor will provide all the information needed regarding testing and then will also provide recommendations if his 3 children would require testing.      PLAN     # Pancreatic cancer  - Continue FOLFIRINOX every 2 weeks as scheduled - C2 due 3/4   - Genetics consult scheduled 3/10   - Dietician is following  - Continue Creon with meals     # Constipation  - Start Miralax daily - hold for loose stools  - Dulcolax as needed for no BM after 2 days  - PO hydration     # Nausea/ Decreased Appetite  - Zofran and Compazine as needed  - Try premedicating with antiemetics prior to meals  - Add protein supplementation  - 1L NS today in Infusion  - IV Zofran and Dex today in Infusion     # Fatigue  - Try adding in exercise     Follow up as scheduled.     Sathish \"Hardy\" was seen today for follow-up.  Diagnoses and all orders for this visit:  Adenocarcinoma of pancreas (Multi) (Primary)  Mass of head of pancreas ([facility])  -     Clinic Appointment " Request  Drug-induced constipation  Chemotherapy induced nausea and vomiting  Other orders  -     Cancel: sodium chloride 0.9% infusion  -     Cancel: ondansetron (Zofran) injection 4 mg  -     Cancel: dexAMETHasone (Decadron) injection 4 mg  -     Cancel: sodium chloride 0.9 % bolus 500 mL  -     Cancel: dextrose 5 % in water (D5W) bolus 500 mL  -     Cancel: diphenhydrAMINE (BENADryl) injection 50 mg  -     Cancel: methylPREDNISolone sod succinate (SOLU-Medrol) 40 mg/mL injection 40 mg  -     Cancel: famotidine PF (Pepcid) injection 20 mg  -     Cancel: EPINEPHrine (Epipen) injection syringe 0.3 mg  -     Cancel: albuterol 2.5 mg /3 mL (0.083 %) nebulizer solution 3 mL    Venous Access Orders  mFOLFIRINOX (Fluorouracil Continuous Infusion / Leucovorin / Irinotecan / Oxaliplatin), 14 Day Cycles    Patient verbalizes understanding of above plan. Time provided for patient's questions. All questions answered to patient's satisfaction in office. Patient instructed to reach out for any new concerning issues at [phone].    Rolanda Gallardo MSN, APRN, A-GNP-C, AOCNP  [facility]  Division of Hematology & Medical Oncology   [address] [place] [address]  Phone: [phone]  Available via [employer] Secure Chat  [email or web address]

## 2025-02-25 ENCOUNTER — NUTRITION (OUTPATIENT)
Dept: HEMATOLOGY/ONCOLOGY | Facility: CLINIC | Age: 60
End: 2025-02-25

## 2025-02-25 ENCOUNTER — OFFICE VISIT (OUTPATIENT)
Dept: HEMATOLOGY/ONCOLOGY | Facility: CLINIC | Age: 60
End: 2025-02-25
Payer: COMMERCIAL

## 2025-02-25 ENCOUNTER — INFUSION (OUTPATIENT)
Dept: HEMATOLOGY/ONCOLOGY | Facility: CLINIC | Age: 60
End: 2025-02-25
Payer: COMMERCIAL

## 2025-02-25 VITALS — WEIGHT: 191.36 LBS | HEIGHT: 69 IN | BODY MASS INDEX: 28.34 KG/M2

## 2025-02-25 VITALS
WEIGHT: 191.36 LBS | RESPIRATION RATE: 16 BRPM | TEMPERATURE: 97 F | HEART RATE: 89 BPM | BODY MASS INDEX: 28.21 KG/M2 | OXYGEN SATURATION: 97 % | DIASTOLIC BLOOD PRESSURE: 91 MMHG | SYSTOLIC BLOOD PRESSURE: 137 MMHG

## 2025-02-25 DIAGNOSIS — K86.89 MASS OF HEAD OF PANCREAS (HHS-HCC): ICD-10-CM

## 2025-02-25 DIAGNOSIS — K59.03 DRUG-INDUCED CONSTIPATION: ICD-10-CM

## 2025-02-25 DIAGNOSIS — C25.9 ADENOCARCINOMA OF PANCREAS (MULTI): Primary | ICD-10-CM

## 2025-02-25 DIAGNOSIS — T45.1X5A CHEMOTHERAPY INDUCED NAUSEA AND VOMITING: ICD-10-CM

## 2025-02-25 DIAGNOSIS — R11.2 CHEMOTHERAPY INDUCED NAUSEA AND VOMITING: ICD-10-CM

## 2025-02-25 DIAGNOSIS — C25.9 ADENOCARCINOMA OF PANCREAS (MULTI): ICD-10-CM

## 2025-02-25 PROCEDURE — 3075F SYST BP GE 130 - 139MM HG: CPT

## 2025-02-25 PROCEDURE — 96375 TX/PRO/DX INJ NEW DRUG ADDON: CPT | Mod: INF

## 2025-02-25 PROCEDURE — 99215 OFFICE O/P EST HI 40 MIN: CPT

## 2025-02-25 PROCEDURE — 96360 HYDRATION IV INFUSION INIT: CPT | Mod: INF

## 2025-02-25 PROCEDURE — 2500000004 HC RX 250 GENERAL PHARMACY W/ HCPCS (ALT 636 FOR OP/ED)

## 2025-02-25 PROCEDURE — 3080F DIAST BP >= 90 MM HG: CPT

## 2025-02-25 RX ORDER — FAMOTIDINE 10 MG/ML
20 INJECTION, SOLUTION INTRAVENOUS ONCE AS NEEDED
OUTPATIENT
Start: 2025-02-25

## 2025-02-25 RX ORDER — ONDANSETRON HYDROCHLORIDE 2 MG/ML
4 INJECTION, SOLUTION INTRAVENOUS ONCE
Status: CANCELLED | OUTPATIENT
Start: 2025-02-25 | End: 2025-02-25

## 2025-02-25 RX ORDER — DIPHENHYDRAMINE HYDROCHLORIDE 50 MG/ML
50 INJECTION INTRAMUSCULAR; INTRAVENOUS AS NEEDED
OUTPATIENT
Start: 2025-02-25

## 2025-02-25 RX ORDER — EPINEPHRINE 0.3 MG/.3ML
0.3 INJECTION SUBCUTANEOUS EVERY 5 MIN PRN
Status: CANCELLED | OUTPATIENT
Start: 2025-02-25

## 2025-02-25 RX ORDER — ALBUTEROL SULFATE 0.83 MG/ML
3 SOLUTION RESPIRATORY (INHALATION) AS NEEDED
Status: DISCONTINUED | OUTPATIENT
Start: 2025-02-25 | End: 2025-02-25 | Stop reason: HOSPADM

## 2025-02-25 RX ORDER — SODIUM CHLORIDE 9 MG/ML
999 INJECTION, SOLUTION INTRAVENOUS CONTINUOUS
Status: CANCELLED | OUTPATIENT
Start: 2025-02-25

## 2025-02-25 RX ORDER — ONDANSETRON HYDROCHLORIDE 2 MG/ML
4 INJECTION, SOLUTION INTRAVENOUS ONCE
Status: COMPLETED | OUTPATIENT
Start: 2025-02-25 | End: 2025-02-25

## 2025-02-25 RX ORDER — ALBUTEROL SULFATE 0.83 MG/ML
3 SOLUTION RESPIRATORY (INHALATION) AS NEEDED
OUTPATIENT
Start: 2025-02-25

## 2025-02-25 RX ORDER — DIPHENHYDRAMINE HYDROCHLORIDE 50 MG/ML
50 INJECTION INTRAMUSCULAR; INTRAVENOUS AS NEEDED
Status: DISCONTINUED | OUTPATIENT
Start: 2025-02-25 | End: 2025-02-25 | Stop reason: HOSPADM

## 2025-02-25 RX ORDER — SODIUM CHLORIDE 9 MG/ML
999 INJECTION, SOLUTION INTRAVENOUS CONTINUOUS
Status: ACTIVE | OUTPATIENT
Start: 2025-02-25 | End: 2025-02-25

## 2025-02-25 RX ORDER — FAMOTIDINE 10 MG/ML
20 INJECTION, SOLUTION INTRAVENOUS ONCE AS NEEDED
Status: DISCONTINUED | OUTPATIENT
Start: 2025-02-25 | End: 2025-02-25 | Stop reason: HOSPADM

## 2025-02-25 RX ORDER — ALBUTEROL SULFATE 0.83 MG/ML
3 SOLUTION RESPIRATORY (INHALATION) AS NEEDED
Status: CANCELLED | OUTPATIENT
Start: 2025-02-25

## 2025-02-25 RX ORDER — FAMOTIDINE 10 MG/ML
20 INJECTION, SOLUTION INTRAVENOUS ONCE AS NEEDED
Status: CANCELLED | OUTPATIENT
Start: 2025-02-25

## 2025-02-25 RX ORDER — EPINEPHRINE 0.3 MG/.3ML
0.3 INJECTION SUBCUTANEOUS EVERY 5 MIN PRN
Status: DISCONTINUED | OUTPATIENT
Start: 2025-02-25 | End: 2025-02-25 | Stop reason: HOSPADM

## 2025-02-25 RX ORDER — DIPHENHYDRAMINE HYDROCHLORIDE 50 MG/ML
50 INJECTION INTRAMUSCULAR; INTRAVENOUS AS NEEDED
Status: CANCELLED | OUTPATIENT
Start: 2025-02-25

## 2025-02-25 RX ORDER — EPINEPHRINE 0.3 MG/.3ML
0.3 INJECTION SUBCUTANEOUS EVERY 5 MIN PRN
OUTPATIENT
Start: 2025-02-25

## 2025-02-25 RX ADMIN — ONDANSETRON 4 MG: 2 INJECTION INTRAMUSCULAR; INTRAVENOUS at 08:54

## 2025-02-25 RX ADMIN — SODIUM CHLORIDE 999 ML/HR: 9 INJECTION, SOLUTION INTRAVENOUS at 08:50

## 2025-02-25 RX ADMIN — DEXAMETHASONE SODIUM PHOSPHATE 4 MG: 4 INJECTION, SOLUTION INTRA-ARTICULAR; INTRALESIONAL; INTRAMUSCULAR; INTRAVENOUS; SOFT TISSUE at 08:56

## 2025-02-25 ASSESSMENT — ENCOUNTER SYMPTOMS
NEUROLOGICAL NEGATIVE: 1
APPETITE CHANGE: 1
VOMITING: 0
FATIGUE: 1
NAUSEA: 1
CARDIOVASCULAR NEGATIVE: 1
DIARRHEA: 0
FEVER: 0
MUSCULOSKELETAL NEGATIVE: 1
CHILLS: 0
RESPIRATORY NEGATIVE: 1
CONSTIPATION: 1
ENDOCRINE NEGATIVE: 1
ABDOMINAL PAIN: 1
UNEXPECTED WEIGHT CHANGE: 0
PSYCHIATRIC NEGATIVE: 1
HEMATOLOGIC/LYMPHATIC NEGATIVE: 1
EYES NEGATIVE: 1

## 2025-02-25 ASSESSMENT — PAIN SCALES - GENERAL: PAINLEVEL_OUTOF10: 8

## 2025-02-25 NOTE — PROGRESS NOTES
Dr. Mesa & Rolanda VAZQUEZ, NP notified of Pt's c/o 7-8/10 abdominal pain.    Reports no BM since Sunday.    + Flatus  No fever  Intermittent nausea    Per team, requested he try miralax daily with stool softener, increase fluid intake and ambulate every few hours.   Emergent symptoms reviewed.    Pt will call / to to ED if these should occur.

## 2025-02-25 NOTE — PROGRESS NOTES
"NUTRITION Follow-Up NOTE    Nutrition Assessment     Reason for Visit:  Sathish Iqbal \"Hardy\" is a 59 y.o. male with pancreatic cancer, currently receiving FOLFIRINOX.     Attempted to meet with patient, but unfortunately unable to meet with them today. Will see patient during second cycle next week. Of note, did receive Creon and is taking per NP note.     This service will continue to follow.        Patient Active Problem List   Diagnosis    CAD (coronary artery disease)    Current every day smoker    Diastolic dysfunction    Erectile dysfunction    History of coronary artery stent placement    Hyperlipidemia    NSTEMI, initial episode of care (Multi)    Obesity, Class I, BMI 30-34.9    Subsequent non-ST elevation (NSTEMI) myocardial infarction    Vitamin D deficiency    Hypertension    Encounter for annual physical exam    Depression screen    BMI 33.0-33.9,adult    Adenocarcinoma of pancreas (Multi)       Nutrition Significant Labs:  Lab Results   Component Value Date/Time    GLUCOSE 154 (H) 02/18/2025 1003     02/18/2025 1003    K 3.8 02/18/2025 1003     02/18/2025 1003    CO2 26 02/18/2025 1003    ANIONGAP 12 02/18/2025 1003    BUN 16 02/18/2025 1003    CREATININE 0.66 02/18/2025 1003    EGFR >90 02/18/2025 1003    CALCIUM 9.0 02/18/2025 1003    ALBUMIN 4.3 02/18/2025 1003    ALKPHOS 63 02/18/2025 1003    PROT 6.5 02/18/2025 1003    AST 16 02/18/2025 1003    BILITOT 0.6 02/18/2025 1003    ALT 25 02/18/2025 1003    MG 2.30 07/17/2023 1135     Lab Results   Component Value Date/Time    VITD25 19 (A) 04/24/2021 0804     CMP Trend:    Recent Labs     02/18/25  1003 01/23/25  1000 01/02/25  2111   GLUCOSE 154* 108* 114*    137 135*   K 3.8 4.1 4.0    101 100   CO2 26 28 26   ANIONGAP 12 12 13   BUN 16 12 15   CREATININE 0.66 0.80 0.91   EGFR >90 >90 >90   CALCIUM 9.0 10.0 9.7   ALBUMIN 4.3 4.9 4.8   ALKPHOS 63 77 71   PROT 6.5 7.8 7.7   AST 16 16 17   BILITOT 0.6 0.9 0.7   ALT 25 20 21 " "        Anthropometrics:  Height: 175.4 cm (5' 9.06\")   Weight: 86.8 kg (191 lb 5.8 oz)   BMI (Calculated): 28.21    IBW/kg (Dietitian Calculated): 72.73 kg Percent of IBW: 119.35 %     Adjusted Body Weight (kg): 77 kg    Weight History:   Daily Weight  02/25/25 : 86.8 kg (191 lb 5.8 oz)  02/25/25 : 86.8 kg (191 lb 5.8 oz)  02/20/25 : 88.5 kg (195 lb)  02/18/25 : 89.8 kg (197 lb 15.6 oz)  02/18/25 : 89.8 kg (197 lb 15.6 oz)  02/12/25 : 93 kg (205 lb)  02/10/25 : 92.7 kg (204 lb 5.9 oz)  01/29/25 : 98.9 kg (218 lb)  01/23/25 : 97.9 kg (215 lb 12.8 oz)  01/23/25 : 97.8 kg (215 lb 9.8 oz)    Weight Change %:  Weight History / % Weight Change: 6 lb (3%) weight loss x 1 week; 44 lb (19%) weight loss x ~2 months  Significant Weight Loss: Yes  Interpretation of Weight Loss: >5% in 1 month    Nutrition History:  Food & Nutrition History:    Food Allergies:    Food Intolerances:    Vitamin/mineral intake:       Herbal supplements:    Medication and Complementary/Alternative Medicine Use:    Dentition:    Sleep Habits:      Diet Recall:  Meal 1:    Snack 1:    Meal 2:    Snack 2:    Meal 3:    Snack 3:    Food Variety:    Oral Nutrition Supplement Use:          Fluid Intake:    Energy Intake:      Food Preparation:  Cooking:    Grocery Shopping:    Dining Out:      Medications:  Current Outpatient Medications   Medication Instructions    aspirin 81 mg, oral, Daily    atorvastatin (LIPITOR) 40 mg, oral, Nightly    clopidogrel (PLAVIX) 75 mg, oral, Daily    lipase-protease-amylase (Creon) 24,000-76,000 -120,000 unit capsule Take 2 capsules with meals Take 1 capsule with snacks    loperamide (Imodium) 2 mg capsule Take 2 capsules (4 mg) by mouth with the first episode of diarrhea and 1 capsule (2 mg) by mouth with any additional episodes. Maximum 8 capsules (16 mg) per day.    losartan (COZAAR) 50 mg, oral, Daily    metoprolol succinate XL (TOPROL-XL) 50 mg, oral, Daily    nitroglycerin (NITROSTAT) 0.4 mg, sublingual, Every 5 " min PRN    ondansetron (ZOFRAN) 8 mg, oral, Every 8 hours PRN    oxyCODONE (ROXICODONE) 5 mg, oral, Every 4 hours PRN    prochlorperazine (COMPAZINE) 10 mg, oral, Every 6 hours PRN    sildenafil (VIAGRA) 50 mg, As needed       Nutrition Focused Physical Exam Findings:    Subcutaneous Fat Loss:        Muscle Wasting:       Physical Findings:          Estimated Needs:  Total Energy Estimated Needs in 24 hours (kCal): 2310 kCal  Method for Estimating Needs: 30-35 kcal/kg ABW  Total Protein Estimated Needs in 24 Hours (g): 115.5 g  Method for Estimating 24 Hour Protein Needs: 1.2-1.5g/kg ABW  Total Fluid Estimated Needs in 24 Hours (mL): 2310 mL  Method for Estimating 24 Hour Fluid Needs: 1 mL/kcal    Nutrition Diagnosis                Nutrition Interventions/Recommendations   Nutrition Prescription:        Nutrition Interventions:   Food and Nutrient Delivery:       Coordination of Care:       Nutrition Education:   Nutrition Education Content:                      Nutrition Monitoring and Evaluation                            Follow Up:

## 2025-02-27 ENCOUNTER — APPOINTMENT (OUTPATIENT)
Dept: GASTROENTEROLOGY | Facility: CLINIC | Age: 60
End: 2025-02-27
Payer: COMMERCIAL

## 2025-02-27 LAB
COMMENTS - MP RESULT TYPE: NORMAL
SCAN RESULT: NORMAL

## 2025-02-28 NOTE — PROGRESS NOTES
Patient ID: Hardy Iqbal is a 59 y.o. male.    Oncology History   Adenocarcinoma of pancreas (Multi)   2/3/2025 Initial Diagnosis    Adenocarcinoma of pancreas (Multi)     2/18/2025 -  Chemotherapy    mFOLFIRINOX (Fluorouracil Continuous Infusion / Leucovorin / Irinotecan / Oxaliplatin), 14 Day Cycles     7/22/2025 - 7/22/2025 Chemotherapy    mFOLFIRINOX (Fluorouracil Continuous Infusion / Leucovorin / Irinotecan / Oxaliplatin), 14 Day Cycles           Subjective    HPI  Mr. Sathish Iqbal is a 59 y.o. male presenting for follow up of pancreatic cancer.      ***    Patient's past medical history, surgical history, family history and social history reviewed.    Review of Systems:   Review of Systems:    Positive per HPI, otherwise negative.       Objective    BSA: There is no height or weight on file to calculate BSA.  There were no vitals taken for this visit.     Physical Exam  Gen: appears well in clinic, NAD  HEENT: atraumatic head, normocephalic, EOMI, conjunctiva normal  LUNG: no increased WOB, CTAB  CV: No JVD. RRR  GI: soft, NT, ND  LE: no LE edema  Skin: no obvious rashes or lesions on visible skin  Neuro: interactive, no focal deficits noted  Psych: normal mood and affect    Performance Status:  {ECOG performance status:64544}    Labs/Imaging/Pathology: Personally reviewed reports and images in Epic electronic medical record system. Pertinent results as it related to the plan represented in below in assessment and plan.       Assessment/Plan   Invasive adenocarcinoma of pancreas, MSI-S  - Initial CA 19-9 less than 4   - Patient was initially presented to the emergency room on 1/2/25 for worsening abdominal pain.   - CT abdomen pelvis at that time revealed a defined mass involving the uncinate process of the pancreas  - biopsy on 1/29/25, which showed invasive adenocarcinoma poorly differentiated. MSI status intact  - CT of the chest on 1/24/25 showed no clear evidence of metastases  - met with Dr. Castro  on 1/23/25 and presents to discuss neoadjuvant  chemotherapy today  - patient has a good PS and discussed FOLFIRINOS with 5-FU, oxaliplatin, and irinotecan  - Reviewed side effects, consent signed.   - Planned for labs today including  DDPD, CBC, CMP, and UGT1A1  - genetics referral  - dietician referral to meet on D1  - will send tissue off for [facility]  - Will plan for port placement this week with goal to start C1 next week  - RTC with me for cycle 2     3/3/25:  ***    Reviewed ongoing medical problems and how they relate to his malignancy, will continue long term monitoring.    RTC in ***  This note has been transcribed using a medical scribe and there is a possibility of unintentional typing misprints     {Assess/PlanSmartLinks:96434}    Ashley Mesa MD  Hematology/Oncology  [facility] at [facility]      Scribe Attestation  By signing my name below, ISanaz Scribe, attest that this documentation has been prepared under the direction and in the presence of Ashley Mesa MD.    Time Spent  Prep time on day of patient encounter:   Time spent directly with patient, family or caregiver:   Additional Time Spent on Patient Care Activities:   Documentation Time:   Other Time Spent:  Total:

## 2025-03-03 ENCOUNTER — LAB (OUTPATIENT)
Dept: LAB | Facility: CLINIC | Age: 60
End: 2025-03-03
Payer: COMMERCIAL

## 2025-03-03 ENCOUNTER — OFFICE VISIT (OUTPATIENT)
Dept: HEMATOLOGY/ONCOLOGY | Facility: CLINIC | Age: 60
End: 2025-03-03
Payer: COMMERCIAL

## 2025-03-03 ENCOUNTER — APPOINTMENT (OUTPATIENT)
Dept: HEMATOLOGY/ONCOLOGY | Facility: CLINIC | Age: 60
End: 2025-03-03
Payer: COMMERCIAL

## 2025-03-03 VITALS
WEIGHT: 193.67 LBS | TEMPERATURE: 98.1 F | SYSTOLIC BLOOD PRESSURE: 114 MMHG | BODY MASS INDEX: 28.56 KG/M2 | OXYGEN SATURATION: 97 % | RESPIRATION RATE: 18 BRPM | DIASTOLIC BLOOD PRESSURE: 74 MMHG | HEART RATE: 71 BPM

## 2025-03-03 DIAGNOSIS — C25.9 ADENOCARCINOMA OF PANCREAS (MULTI): ICD-10-CM

## 2025-03-03 DIAGNOSIS — C25.9 ADENOCARCINOMA OF PANCREAS (MULTI): Primary | ICD-10-CM

## 2025-03-03 LAB
ALBUMIN SERPL BCP-MCNC: 3.8 G/DL (ref 3.4–5)
ALP SERPL-CCNC: 61 U/L (ref 33–136)
ALT SERPL W P-5'-P-CCNC: 31 U/L (ref 10–52)
ANION GAP SERPL CALC-SCNC: 10 MMOL/L (ref 10–20)
AST SERPL W P-5'-P-CCNC: 19 U/L (ref 9–39)
BASOPHILS # BLD AUTO: 0.02 X10*3/UL (ref 0–0.1)
BASOPHILS NFR BLD AUTO: 0.4 %
BILIRUB SERPL-MCNC: 0.5 MG/DL (ref 0–1.2)
BUN SERPL-MCNC: 9 MG/DL (ref 6–23)
CALCIUM SERPL-MCNC: 8.9 MG/DL (ref 8.6–10.3)
CHLORIDE SERPL-SCNC: 105 MMOL/L (ref 98–107)
CO2 SERPL-SCNC: 29 MMOL/L (ref 21–32)
CREAT SERPL-MCNC: 0.76 MG/DL (ref 0.5–1.3)
EGFRCR SERPLBLD CKD-EPI 2021: >90 ML/MIN/1.73M*2
EOSINOPHIL # BLD AUTO: 0.08 X10*3/UL (ref 0–0.7)
EOSINOPHIL NFR BLD AUTO: 1.5 %
ERYTHROCYTE [DISTWIDTH] IN BLOOD BY AUTOMATED COUNT: 12.1 % (ref 11.5–14.5)
GLUCOSE SERPL-MCNC: 123 MG/DL (ref 74–99)
HCT VFR BLD AUTO: 39.3 % (ref 41–52)
HGB BLD-MCNC: 13.3 G/DL (ref 13.5–17.5)
IMM GRANULOCYTES # BLD AUTO: 0.01 X10*3/UL (ref 0–0.7)
IMM GRANULOCYTES NFR BLD AUTO: 0.2 % (ref 0–0.9)
LYMPHOCYTES # BLD AUTO: 1.73 X10*3/UL (ref 1.2–4.8)
LYMPHOCYTES NFR BLD AUTO: 32.4 %
MCH RBC QN AUTO: 30 PG (ref 26–34)
MCHC RBC AUTO-ENTMCNC: 33.8 G/DL (ref 32–36)
MCV RBC AUTO: 89 FL (ref 80–100)
MONOCYTES # BLD AUTO: 0.5 X10*3/UL (ref 0.1–1)
MONOCYTES NFR BLD AUTO: 9.4 %
NEUTROPHILS # BLD AUTO: 3 X10*3/UL (ref 1.2–7.7)
NEUTROPHILS NFR BLD AUTO: 56.1 %
PLATELET # BLD AUTO: 210 X10*3/UL (ref 150–450)
POTASSIUM SERPL-SCNC: 3.5 MMOL/L (ref 3.5–5.3)
PROT SERPL-MCNC: 6 G/DL (ref 6.4–8.2)
RBC # BLD AUTO: 4.44 X10*6/UL (ref 4.5–5.9)
SODIUM SERPL-SCNC: 140 MMOL/L (ref 136–145)
WBC # BLD AUTO: 5.3 X10*3/UL (ref 4.4–11.3)

## 2025-03-03 PROCEDURE — 3074F SYST BP LT 130 MM HG: CPT | Performed by: INTERNAL MEDICINE

## 2025-03-03 PROCEDURE — 84075 ASSAY ALKALINE PHOSPHATASE: CPT

## 2025-03-03 PROCEDURE — 3078F DIAST BP <80 MM HG: CPT | Performed by: INTERNAL MEDICINE

## 2025-03-03 PROCEDURE — 85025 COMPLETE CBC W/AUTO DIFF WBC: CPT

## 2025-03-03 PROCEDURE — G2211 COMPLEX E/M VISIT ADD ON: HCPCS | Performed by: INTERNAL MEDICINE

## 2025-03-03 PROCEDURE — 99215 OFFICE O/P EST HI 40 MIN: CPT | Performed by: INTERNAL MEDICINE

## 2025-03-03 RX ORDER — FAMOTIDINE 10 MG/ML
20 INJECTION, SOLUTION INTRAVENOUS ONCE AS NEEDED
OUTPATIENT
Start: 2025-03-18

## 2025-03-03 RX ORDER — DIPHENHYDRAMINE HYDROCHLORIDE 50 MG/ML
50 INJECTION INTRAMUSCULAR; INTRAVENOUS AS NEEDED
OUTPATIENT
Start: 2025-03-18

## 2025-03-03 RX ORDER — PROCHLORPERAZINE EDISYLATE 5 MG/ML
10 INJECTION INTRAMUSCULAR; INTRAVENOUS EVERY 6 HOURS PRN
OUTPATIENT
Start: 2025-03-18

## 2025-03-03 RX ORDER — DIPHENHYDRAMINE HCL 25 MG
50 CAPSULE ORAL ONCE
OUTPATIENT
Start: 2025-03-18 | End: 2025-03-18

## 2025-03-03 RX ORDER — ALBUTEROL SULFATE 0.83 MG/ML
3 SOLUTION RESPIRATORY (INHALATION) AS NEEDED
OUTPATIENT
Start: 2025-03-18

## 2025-03-03 RX ORDER — PALONOSETRON 0.05 MG/ML
250 INJECTION, SOLUTION INTRAVENOUS ONCE
OUTPATIENT
Start: 2025-03-20 | End: 2025-03-20

## 2025-03-03 RX ORDER — LORAZEPAM 2 MG/ML
1 INJECTION INTRAMUSCULAR AS NEEDED
OUTPATIENT
Start: 2025-03-18

## 2025-03-03 RX ORDER — DEXAMETHASONE 6 MG/1
12 TABLET ORAL ONCE
Status: CANCELLED | OUTPATIENT
Start: 2025-03-04 | End: 2025-03-04

## 2025-03-03 RX ORDER — ONDANSETRON HYDROCHLORIDE 8 MG/1
8 TABLET, FILM COATED ORAL EVERY 8 HOURS PRN
Qty: 60 TABLET | Refills: 5 | Status: SHIPPED | OUTPATIENT
Start: 2025-03-03 | End: 2025-07-01

## 2025-03-03 RX ORDER — ONDANSETRON HYDROCHLORIDE 8 MG/1
16 TABLET, FILM COATED ORAL ONCE
Status: CANCELLED | OUTPATIENT
Start: 2025-03-04 | End: 2025-03-04

## 2025-03-03 RX ORDER — ATROPINE SULFATE 0.1 MG/ML
0.4 INJECTION INTRAVENOUS ONCE
OUTPATIENT
Start: 2025-03-18 | End: 2025-03-18

## 2025-03-03 RX ORDER — DIPHENHYDRAMINE HYDROCHLORIDE 50 MG/ML
50 INJECTION INTRAMUSCULAR; INTRAVENOUS AS NEEDED
OUTPATIENT
Start: 2025-03-20

## 2025-03-03 RX ORDER — ATROPINE SULFATE 0.4 MG/ML
0.4 INJECTION, SOLUTION ENDOTRACHEAL; INTRAMEDULLARY; INTRAMUSCULAR; INTRAVENOUS; SUBCUTANEOUS
OUTPATIENT
Start: 2025-03-18

## 2025-03-03 RX ORDER — PROCHLORPERAZINE MALEATE 10 MG
10 TABLET ORAL EVERY 6 HOURS PRN
OUTPATIENT
Start: 2025-03-18

## 2025-03-03 RX ORDER — EPINEPHRINE 0.3 MG/.3ML
0.3 INJECTION SUBCUTANEOUS EVERY 5 MIN PRN
OUTPATIENT
Start: 2025-03-18

## 2025-03-03 RX ORDER — ONDANSETRON HYDROCHLORIDE 8 MG/1
16 TABLET, FILM COATED ORAL ONCE
OUTPATIENT
Start: 2025-03-18 | End: 2025-03-18

## 2025-03-03 RX ORDER — EPINEPHRINE 0.3 MG/.3ML
0.3 INJECTION SUBCUTANEOUS EVERY 5 MIN PRN
OUTPATIENT
Start: 2025-03-20

## 2025-03-03 RX ORDER — DEXAMETHASONE 6 MG/1
12 TABLET ORAL ONCE
OUTPATIENT
Start: 2025-03-18 | End: 2025-03-18

## 2025-03-03 RX ORDER — ALBUTEROL SULFATE 0.83 MG/ML
3 SOLUTION RESPIRATORY (INHALATION) AS NEEDED
OUTPATIENT
Start: 2025-03-20

## 2025-03-03 RX ORDER — FAMOTIDINE 10 MG/ML
20 INJECTION, SOLUTION INTRAVENOUS ONCE AS NEEDED
OUTPATIENT
Start: 2025-03-20

## 2025-03-03 ASSESSMENT — PAIN SCALES - GENERAL: PAINLEVEL_OUTOF10: 2

## 2025-03-03 NOTE — PROGRESS NOTES
Patient ID: Hardy Iqbal is a 59 y.o. male.    Oncology History   Adenocarcinoma of pancreas (Multi)   2/3/2025 Initial Diagnosis    Adenocarcinoma of pancreas (Multi)     2/18/2025 -  Chemotherapy    mFOLFIRINOX (Fluorouracil Continuous Infusion / Leucovorin / Irinotecan / Oxaliplatin), 14 Day Cycles     7/22/2025 - 7/22/2025 Chemotherapy    mFOLFIRINOX (Fluorouracil Continuous Infusion / Leucovorin / Irinotecan / Oxaliplatin), 14 Day Cycles           Subjective    HPI  Mr. Sathish Iqbal is a 59 y.o. male presenting for follow up of pancreatic cancer.  He is here for consideration for cycle 2 tomorrow; blood work of today unremarkable.     Of note, he has one variant of UGT1A1.    He did have GI upset and fatigue with cycle 1, noting that he spent more time on his couch. He has been taking his prescribed nausea medications which offered some relief.     He has noticed lower back pain, which gets better with rest, over the last two days. Of note, he has restarted working as a .     He denies rash, new abdominal pain, and leg swelling.    Patient's past medical history, surgical history, family history and social history reviewed.    Review of Systems:   Review of Systems:    Positive per HPI, otherwise negative.       Objective    BSA: 2.07 meters squared  /74   Pulse 71   Temp 36.7 °C (98.1 °F)   Resp 18   Wt 87.9 kg (193 lb 10.8 oz)   SpO2 97%   BMI 28.56 kg/m²      Physical Exam  Gen: appears well in clinic, NAD  HEENT: atraumatic head, normocephalic, EOMI, conjunctiva normal  LUNG: no increased WOB, CTAB  CV: No JVD. RRR  GI: soft, NT, ND  LE: no LE edema  Skin: no obvious rashes or lesions on visible skin  Neuro: interactive, no focal deficits noted  Psych: normal mood and affect    Performance Status:  Symptomatic; fully ambulatory    Labs/Imaging/Pathology: Personally reviewed reports and images in Epic electronic medical record system. Pertinent results as it related to the plan  represented in below in assessment and plan.       Assessment/Plan   Invasive adenocarcinoma of pancreas, MSI-S  - Initial CA 19-9 less than 4   - Patient was initially presented to the emergency room on 1/2/25 for worsening abdominal pain.   - CT abdomen pelvis at that time revealed a defined mass involving the uncinate process of the pancreas  - biopsy on 1/29/25, which showed invasive adenocarcinoma poorly differentiated. MSI status intact  - CT of the chest on 1/24/25 showed no clear evidence of metastases  - met with Dr. Castro on 1/23/25 and presents to discuss neoadjuvant  chemotherapy today  - patient has a good PS and discussed FOLFIRINOS with 5-FU, oxaliplatin, and irinotecan  - Reviewed side effects, consent signed.   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 He can cancel if he prefers.   - Labs unremarkable.    - Prescription for Zofran refilled at a higher quantity of 60 mg.    - RTC with me in two weeks    Reviewed ongoing medical problems and how they relate to his malignancy, will continue long term monitoring.    RTC in 2 weeks with me  This note has been transcribed using a medical scribe and there is a possibility of unintentional typing misprints     Diagnoses and all orders for this visit:  Adenocarcinoma of pancreas (Multi)  -     Clinic Appointment Request  -     Clinic Appointment Request; Future  -     Infusion Appointment Request; Future  -     Oncology Line Draw Appointment Request; Future  -     CBC and Auto Differential; Future  -     Comprehensive metabolic panel;  Future  -     Infusion Appointment Request; Future  -     ondansetron (Zofran) 8 mg tablet; Take 1 tablet (8 mg) by mouth every 8 hours if needed for nausea or vomiting.  -     Clinic Appointment Request [facility]; Future  -     Infusion Appointment Request [facility]; Future  Other orders  -     ondansetron (Zofran) tablet 16 mg  -     dexAMETHasone (Decadron) tablet 12 mg  -     irinotecan (Camptosar) 240 mg in dextrose 5% 512 mL IV  -     ondansetron (Zofran) tablet 16 mg  -     dexAMETHasone (Decadron) tablet 12 mg  -     atropine syringe 0.4 mg  -     diphenhydrAMINE (BENADryl) capsule 50 mg  -     atropine injection 0.4 mg  -     prochlorperazine (Compazine) tablet 10 mg  -     prochlorperazine (Compazine) injection 10 mg  -     OXALIplatin (Eloxatin) 180 mg in dextrose 5% 536 mL IV  -     leucovorin 860 mg in dextrose 5% 143 mL IV  -     irinotecan (Camptosar) 240 mg in dextrose 5% 512 mL IV  -     fluorouracil (Adrucil) 5,100 mg in sodium chloride 0.9% 138 mL IV via Home Infusion  -     LORazepam (Ativan) injection 1 mg  -     sodium chloride 0.9 % bolus 500 mL  -     dextrose 5 % in water (D5W) bolus 500 mL  -     diphenhydrAMINE (BENADryl) injection 50 mg  -     methylPREDNISolone sod succinate (SOLU-Medrol) 40 mg/mL injection 40 mg  -     famotidine PF (Pepcid) injection 20 mg  -     EPINEPHrine (Epipen) injection syringe 0.3 mg  -     albuterol 2.5 mg /3 mL (0.083 %) nebulizer solution 3 mL  -     dexAMETHasone (Decadron) injection 8 mg  -     palonosetron (Aloxi) injection 250 mcg  -     sodium chloride 0.9 % bolus 500 mL  -     dextrose 5 % in water (D5W) bolus 500 mL  -     diphenhydrAMINE (BENADryl) injection 50 mg  -     methylPREDNISolone sod succinate (SOLU-Medrol) 40 mg/mL injection 40 mg  -     famotidine PF (Pepcid) injection 20 mg  -     EPINEPHrine (Epipen) injection syringe 0.3 mg  -     albuterol 2.5 mg /3 mL (0.083 %) nebulizer solution 3 mL    Ashley Mesa,  MD  Hematology/Oncology  [facility] at [facility]      Otoniel Attestation  By signing my name below, I, Otoniel Fallon, attest that this documentation has been prepared under the direction and in the presence of Ashley Mesa MD.    Time Spent  Prep time on day of patient encounter: 5 minutes  Time spent directly with patient, family or caregiver: 25 minutes  Additional Time Spent on Patient Care Activities: 5 minutes  Documentation Time: 5 minutes  Other Time Spent: 0 minutes  Total: 40 minutes

## 2025-03-04 ENCOUNTER — INFUSION (OUTPATIENT)
Dept: HEMATOLOGY/ONCOLOGY | Facility: CLINIC | Age: 60
End: 2025-03-04
Payer: COMMERCIAL

## 2025-03-04 ENCOUNTER — NUTRITION (OUTPATIENT)
Dept: HEMATOLOGY/ONCOLOGY | Facility: CLINIC | Age: 60
End: 2025-03-04
Payer: COMMERCIAL

## 2025-03-04 ENCOUNTER — SOCIAL WORK (OUTPATIENT)
Dept: HEMATOLOGY/ONCOLOGY | Facility: CLINIC | Age: 60
End: 2025-03-04
Payer: COMMERCIAL

## 2025-03-04 ENCOUNTER — APPOINTMENT (OUTPATIENT)
Dept: HEMATOLOGY/ONCOLOGY | Facility: CLINIC | Age: 60
End: 2025-03-04
Payer: COMMERCIAL

## 2025-03-04 VITALS
WEIGHT: 191.36 LBS | BODY MASS INDEX: 28.21 KG/M2 | TEMPERATURE: 96.8 F | HEART RATE: 81 BPM | SYSTOLIC BLOOD PRESSURE: 127 MMHG | DIASTOLIC BLOOD PRESSURE: 74 MMHG | RESPIRATION RATE: 16 BRPM | OXYGEN SATURATION: 96 %

## 2025-03-04 VITALS — HEIGHT: 69 IN | BODY MASS INDEX: 28.34 KG/M2 | WEIGHT: 191.36 LBS

## 2025-03-04 DIAGNOSIS — C25.9 ADENOCARCINOMA OF PANCREAS (MULTI): Primary | ICD-10-CM

## 2025-03-04 PROCEDURE — 2500000004 HC RX 250 GENERAL PHARMACY W/ HCPCS (ALT 636 FOR OP/ED): Performed by: INTERNAL MEDICINE

## 2025-03-04 PROCEDURE — 96416 CHEMO PROLONG INFUSE W/PUMP: CPT

## 2025-03-04 PROCEDURE — 96415 CHEMO IV INFUSION ADDL HR: CPT

## 2025-03-04 PROCEDURE — 96375 TX/PRO/DX INJ NEW DRUG ADDON: CPT | Mod: INF

## 2025-03-04 PROCEDURE — 96417 CHEMO IV INFUS EACH ADDL SEQ: CPT

## 2025-03-04 PROCEDURE — 2500000001 HC RX 250 WO HCPCS SELF ADMINISTERED DRUGS (ALT 637 FOR MEDICARE OP): Performed by: INTERNAL MEDICINE

## 2025-03-04 PROCEDURE — 96413 CHEMO IV INFUSION 1 HR: CPT

## 2025-03-04 RX ORDER — DEXAMETHASONE 6 MG/1
12 TABLET ORAL ONCE
Status: COMPLETED | OUTPATIENT
Start: 2025-03-04 | End: 2025-03-04

## 2025-03-04 RX ORDER — ATROPINE SULFATE 0.4 MG/ML
0.4 INJECTION, SOLUTION ENDOTRACHEAL; INTRAMEDULLARY; INTRAMUSCULAR; INTRAVENOUS; SUBCUTANEOUS ONCE
Status: COMPLETED | OUTPATIENT
Start: 2025-03-04 | End: 2025-03-04

## 2025-03-04 RX ORDER — HEPARIN SODIUM,PORCINE/PF 10 UNIT/ML
50 SYRINGE (ML) INTRAVENOUS AS NEEDED
Status: CANCELLED | OUTPATIENT
Start: 2025-03-04

## 2025-03-04 RX ORDER — ONDANSETRON HYDROCHLORIDE 8 MG/1
16 TABLET, FILM COATED ORAL ONCE
Status: COMPLETED | OUTPATIENT
Start: 2025-03-04 | End: 2025-03-04

## 2025-03-04 RX ORDER — DIPHENHYDRAMINE HCL 25 MG
50 CAPSULE ORAL ONCE
Status: COMPLETED | OUTPATIENT
Start: 2025-03-04 | End: 2025-03-04

## 2025-03-04 RX ORDER — HEPARIN 100 UNIT/ML
500 SYRINGE INTRAVENOUS AS NEEDED
Status: CANCELLED | OUTPATIENT
Start: 2025-03-04

## 2025-03-04 RX ORDER — ATROPINE SULFATE 0.1 MG/ML
0.4 INJECTION INTRAVENOUS ONCE
Status: DISCONTINUED | OUTPATIENT
Start: 2025-03-04 | End: 2025-03-04 | Stop reason: SDUPTHER

## 2025-03-04 RX ADMIN — ONDANSETRON HYDROCHLORIDE 16 MG: 8 TABLET, FILM COATED ORAL at 09:38

## 2025-03-04 RX ADMIN — IRINOTECAN HYDROCHLORIDE 240 MG: 20 INJECTION, SOLUTION INTRAVENOUS at 12:20

## 2025-03-04 RX ADMIN — OXALIPLATIN 180 MG: 5 INJECTION, SOLUTION INTRAVENOUS at 10:08

## 2025-03-04 RX ADMIN — DEXAMETHASONE 12 MG: 6 TABLET ORAL at 09:38

## 2025-03-04 RX ADMIN — DIPHENHYDRAMINE HYDROCHLORIDE 50 MG: 25 CAPSULE ORAL at 09:38

## 2025-03-04 RX ADMIN — FLUOROURACIL 5100 MG: 50 INJECTION, SOLUTION INTRAVENOUS at 14:06

## 2025-03-04 RX ADMIN — ATROPINE SULFATE 0.4 MG: 0.4 INJECTION, SOLUTION INTRAVENOUS at 12:16

## 2025-03-04 ASSESSMENT — PAIN SCALES - GENERAL: PAINLEVEL_OUTOF10: 0-NO PAIN

## 2025-03-04 NOTE — PROGRESS NOTES
The patient is a 59 year old who has been diagnosed with pancreatic cancer. I attempted to meet with him today as he was receiving treatment to introduce social work services and offer support, however he was asleep each time I attempted to meet him. I will try again as he returns. Of note, the patient reported a distress score of 7 at his initial visit due to physical concerns and worry/anxiety. No social work needs have been reported thus far. Social work will remain available to assist this patient.    CARLINE Lambert, LILLIAN

## 2025-03-04 NOTE — PROGRESS NOTES
"NUTRITION Follow-Up NOTE    Nutrition Assessment     Reason for Visit:  Sathish Iqbal \"Hardy\" is a 59 y.o. male with pancreatic cancer, currently receiving mFOLFIRINOX.     Patient Active Problem List   Diagnosis    CAD (coronary artery disease)    Current every day smoker    Diastolic dysfunction    Erectile dysfunction    History of coronary artery stent placement    Hyperlipidemia    NSTEMI, initial episode of care (Multi)    Obesity, Class I, BMI 30-34.9    Subsequent non-ST elevation (NSTEMI) myocardial infarction    Vitamin D deficiency    Hypertension    Encounter for annual physical exam    Depression screen    BMI 33.0-33.9,adult    Adenocarcinoma of pancreas (Multi)    Chemotherapy induced nausea and vomiting    Drug-induced constipation       Nutrition Significant Labs:  Lab Results   Component Value Date/Time    GLUCOSE 123 (H) 03/03/2025 0807     03/03/2025 0807    K 3.5 03/03/2025 0807     03/03/2025 0807    CO2 29 03/03/2025 0807    ANIONGAP 10 03/03/2025 0807    BUN 9 03/03/2025 0807    CREATININE 0.76 03/03/2025 0807    EGFR >90 03/03/2025 0807    CALCIUM 8.9 03/03/2025 0807    ALBUMIN 3.8 03/03/2025 0807    ALKPHOS 61 03/03/2025 0807    PROT 6.0 (L) 03/03/2025 0807    AST 19 03/03/2025 0807    BILITOT 0.5 03/03/2025 0807    ALT 31 03/03/2025 0807    MG 2.30 07/17/2023 1135     Lab Results   Component Value Date/Time    VITD25 19 (A) 04/24/2021 0804     CMP Trend:    Recent Labs     03/03/25  0807 02/18/25  1003 01/23/25  1000   GLUCOSE 123* 154* 108*    136 137   K 3.5 3.8 4.1    102 101   CO2 29 26 28   ANIONGAP 10 12 12   BUN 9 16 12   CREATININE 0.76 0.66 0.80   EGFR >90 >90 >90   CALCIUM 8.9 9.0 10.0   ALBUMIN 3.8 4.3 4.9   ALKPHOS 61 63 77   PROT 6.0* 6.5 7.8   AST 19 16 16   BILITOT 0.5 0.6 0.9   ALT 31 25 20   , CBC Trend:   Recent Labs     03/03/25  0807 02/10/25  1230 01/23/25  1000 01/02/25  2111   WBC 5.3 10.6 9.2 10.9   NRBC  --   --   --  0.0   RBC 4.44* 5.04 " "5.13 5.06   HGB 13.3* 15.3 15.9 16.0   HCT 39.3* 44.3 46.7 45.5   MCV 89 88 91 90   MCH 30.0 30.4 31.0 31.6   MCHC 33.8 34.5 34.0 35.2   RDW 12.1 11.5 11.7 11.8    259 257 258   , and DM Specific Labs Trend (Includes HgbA1C, antibodies & fasting insulin): No results for input(s): \"HGBA1C\", \"CPEPTIDE\", \"INSULFAST\" in the last 11341 hours.    No lab exists for component: \"BHDRXBUT\", \"PHA5OEG\"    Anthropometrics:  Height: 175.4 cm (5' 9.06\")   Weight: 86.8 kg (191 lb 5.8 oz)   BMI (Calculated): 28.21    IBW/kg (Dietitian Calculated): 72.73 kg Percent of IBW: 119.35 %     Adjusted Body Weight (kg): 77 kg    Weight History:   Daily Weight  03/04/25 : 86.8 kg (191 lb 5.8 oz)  03/04/25 : 86.8 kg (191 lb 5.8 oz)  03/03/25 : 87.9 kg (193 lb 10.8 oz)  02/25/25 : 86.8 kg (191 lb 5.8 oz)  02/25/25 : 86.8 kg (191 lb 5.8 oz)  02/20/25 : 88.5 kg (195 lb)  02/18/25 : 89.8 kg (197 lb 15.6 oz)  02/18/25 : 89.8 kg (197 lb 15.6 oz)  02/12/25 : 93 kg (205 lb)  02/10/25 : 92.7 kg (204 lb 5.9 oz)    Weight Change %:  Weight History / % Weight Change: weight stable x 1 week, however, patient with 27 lb (12%) weight loss x 1.5 months. Additional weight loss prior  Significant Weight Loss: Yes  Interpretation of Weight Loss: >5% in 1 month    Nutrition History:  Food & Nutrition History:  Struggled with tx related side effects following treatment. Used anti-emetics to help with N/V. Experienced constipation and diarrhea as well. Feels that SS/laxative use for constipation caused diarrhea. Few days of decreased appetite, but was able to resume normal PO intake during second week. He is taking Creon and has noticed less abdominal pain. He feels prepared to better manage tx related side effects with this cycle. Of note, irinotecan dose reduced by 25% given UGT1A1 variant.     Medications:  Current Outpatient Medications   Medication Instructions    aspirin 81 mg, oral, Daily    atorvastatin (LIPITOR) 40 mg, oral, Nightly    clopidogrel " (PLAVIX) 75 mg, oral, Daily    lipase-protease-amylase (Creon) 24,000-76,000 -120,000 unit capsule Take 2 capsules with meals Take 1 capsule with snacks    loperamide (Imodium) 2 mg capsule Take 2 capsules (4 mg) by mouth with the first episode of diarrhea and 1 capsule (2 mg) by mouth with any additional episodes. Maximum 8 capsules (16 mg) per day.    losartan (COZAAR) 50 mg, oral, Daily    metoprolol succinate XL (TOPROL-XL) 50 mg, oral, Daily    nitroglycerin (NITROSTAT) 0.4 mg, sublingual, Every 5 min PRN    ondansetron (ZOFRAN) 8 mg, oral, Every 8 hours PRN    oxyCODONE (ROXICODONE) 5 mg, oral, Every 4 hours PRN    prochlorperazine (COMPAZINE) 10 mg, oral, Every 6 hours PRN    sildenafil (VIAGRA) 50 mg, As needed       Nutrition Focused Physical Exam Findings: Deferred       Estimated Needs:       Total Energy Estimated Needs in 24 hours (kCal): 2310 kCal  Method for Estimating Needs: 30-35 kcal/kg ABW  Total Protein Estimated Needs in 24 Hours (g): 115.5 g  Method for Estimating 24 Hour Protein Needs: 1.2-1.5g/kg ABW  Total Fluid Estimated Needs in 24 Hours (mL): 2310 mL  Method for Estimating 24 Hour Fluid Needs: 1 mL/kcal             Nutrition Diagnosis        Nutrition Diagnosis  Patient has Nutrition Diagnosis: Yes  Diagnosis Status (1): Active (improving)  Nutrition Diagnosis 1: Altered GI function  Related to (1): pancreatic cancer  As Evidenced by (1): s/s of pancreatic insufficiency  Additional Assessment Information (1): improved with Creon use  Additional Nutrition Diagnosis: Diagnosis 2  Diagnosis Status (2): Active  Nutrition Diagnosis 2: Unintended weight loss  Related to (2): diagnosis and stress leading to decreased oral intake  As Evidenced by (2): 30 lb (13%) weight loss x 1 month  Additional Assessment Information (2): stable weight x 1 week       Nutrition Interventions/Recommendations   Nutrition Prescription: Oral nutrition Continue with: high protein/high calorie diet with a focus on GI  soft foods as needed    Nutrition Interventions:   Food and Nutrient Delivery: Meals & Snacks: Energy-modified diet  Goals: Continue with: high protein/high calorie diet for weight and LBM maintenance; adjust diet as needed, based on nutrition impact symptoms following treatment. Reviewed how to manage diet based on treatment side effects with patient and SO. Education materials, food lists, and list of additional resources provided.  Medical Food Supplement: Commercial beverage medical food supplement therapy  Goals: utlize high protein/high calorie supplements and/or homemade shakes/smoothies PRN during periods of poor appetite and PO intake. Utilize ORS if having N/V/D/C. Disucssed using Enterade BID x 7 days to see if this improves GI toxicity     Nutrition Monitoring and Evaluation   Food and Nutrient Intake  Monitoring and Evaluation Plan: Energy intake, Fluid intake, Protein intake    Anthropometric measurements  Monitoring and Evaluation Plan: Weight  Criteria: weight and LBM maintenance         Nutrition Focused Physical Findings  Monitoring and Evaluation Plan: Digestive System  Criteria: monitor for any adverse GI side effects from treatment and adjust diet as needed  Other: continue to monitor response to Creon 24,000 lipase units/capsule 2 with meals and 1 with snacks       Follow Up: RD contact information provided. This service will continue to follow.

## 2025-03-04 NOTE — SIGNIFICANT EVENT
03/04/25 0907   Prechemo Checklist   Has the patient been in the hospital, ED, or urgent care since last date of service No   Chemo/Immuno Consent Completed and Signed Yes  (2/10/25)   Protocol/Indications Verified Yes   Confirmed to previous date/time of medication Yes  (C2D1 today)   Compared to previous dose Yes  (Irinotecan decreased by 25% d/t diarrhea)   All medications are dated accurately Yes   Pregnancy Test Negative Not applicable   Parameters Met Yes  (3/3/25 - ANC: 3.00, PLTS: 210, bili: 0.5, CrCl: greater than 30mL/min)   BSA/Weight-Height Verified Yes   Dose Calculations Verified (current, total, cumulative) Yes

## 2025-03-06 ENCOUNTER — INFUSION (OUTPATIENT)
Dept: HEMATOLOGY/ONCOLOGY | Facility: CLINIC | Age: 60
End: 2025-03-06
Payer: COMMERCIAL

## 2025-03-06 VITALS
RESPIRATION RATE: 16 BRPM | SYSTOLIC BLOOD PRESSURE: 100 MMHG | HEART RATE: 72 BPM | WEIGHT: 189.82 LBS | OXYGEN SATURATION: 94 % | DIASTOLIC BLOOD PRESSURE: 62 MMHG | TEMPERATURE: 97.7 F | BODY MASS INDEX: 27.99 KG/M2

## 2025-03-06 DIAGNOSIS — C25.9 ADENOCARCINOMA OF PANCREAS (MULTI): ICD-10-CM

## 2025-03-06 PROCEDURE — 96375 TX/PRO/DX INJ NEW DRUG ADDON: CPT | Mod: INF

## 2025-03-06 PROCEDURE — 2500000004 HC RX 250 GENERAL PHARMACY W/ HCPCS (ALT 636 FOR OP/ED): Performed by: INTERNAL MEDICINE

## 2025-03-06 PROCEDURE — 96374 THER/PROPH/DIAG INJ IV PUSH: CPT | Mod: INF

## 2025-03-06 RX ORDER — HEPARIN SODIUM,PORCINE/PF 10 UNIT/ML
50 SYRINGE (ML) INTRAVENOUS AS NEEDED
OUTPATIENT
Start: 2025-03-06

## 2025-03-06 RX ORDER — PALONOSETRON 0.05 MG/ML
250 INJECTION, SOLUTION INTRAVENOUS ONCE
Status: COMPLETED | OUTPATIENT
Start: 2025-03-06 | End: 2025-03-06

## 2025-03-06 RX ORDER — HEPARIN 100 UNIT/ML
500 SYRINGE INTRAVENOUS AS NEEDED
OUTPATIENT
Start: 2025-03-06

## 2025-03-06 RX ADMIN — PALONOSETRON 250 MCG: 0.05 INJECTION, SOLUTION INTRAVENOUS at 12:32

## 2025-03-06 RX ADMIN — DEXAMETHASONE SODIUM PHOSPHATE 8 MG: 4 INJECTION, SOLUTION INTRA-ARTICULAR; INTRALESIONAL; INTRAMUSCULAR; INTRAVENOUS; SOFT TISSUE at 12:34

## 2025-03-06 ASSESSMENT — ENCOUNTER SYMPTOMS
VOMITING: 0
CHILLS: 0
APPETITE CHANGE: 1
UNEXPECTED WEIGHT CHANGE: 0
EYES NEGATIVE: 1
PSYCHIATRIC NEGATIVE: 1
NEUROLOGICAL NEGATIVE: 1
NAUSEA: 1
HEMATOLOGIC/LYMPHATIC NEGATIVE: 1
MUSCULOSKELETAL NEGATIVE: 1
ENDOCRINE NEGATIVE: 1
CONSTIPATION: 1
FEVER: 0
ABDOMINAL PAIN: 1
CARDIOVASCULAR NEGATIVE: 1
FATIGUE: 1
DIARRHEA: 0
RESPIRATORY NEGATIVE: 1

## 2025-03-06 ASSESSMENT — PAIN SCALES - GENERAL: PAINLEVEL_OUTOF10: 0-NO PAIN

## 2025-03-06 NOTE — PROGRESS NOTES
"  Patient ID: Sathish Iqbal \"Bill\" is a 59 y.o. male.  Diagnosis:  Pancreatic Cancer  MedOnc: Dr. Mesa  SurgOnc: Sr. Castro   Primary Care Provider: Tamy Pascual MD  Referring Provider: Ashley Mesa MD  [address]  [place] [address]  Visit Type: Follow Up    Date of Service: 03/06/25  Location: [facility]     Patient Care Team:  Tamy Pascual MD as PCP - General  Tamy Pascual MD as PCP - [facility] PCP  Eliezer Puente DO as Cardiologist (Cardiology)  Ashley Mesa MD as Consulting Physician (Hematology and Oncology)    Current Therapy: mFOLFIRINOX     ONCOLOGIC HISTORY     No matching staging information was found for the patient.    Invasive adenocarcinoma of pancreas, MSI-S  Initial CA 19-9 less than 4   1/2/2025: ED for abd pain. CT abdomen pelvis showed pancreatic mass   1/29/2025: biopsy - adenocarcinoma poorly differentiated. MSI status intact  1/23/2025: Saw Dr. Castro   1/24/2025: CT of the chest - no evidence of metastases  2/12/2025: Port placement  2/15/2025: [facility] somatic testing - No reportable pathogenic variants were found  2/18/2025: Cycle 1 FOLFIRINOX   3/4/2025: Cycle 2 FOLFIRINOX - copy of UTG1A1 gene and given diarrhea will dose reduce iriniotecan by 25%     Oncology History   Adenocarcinoma of pancreas (Multi)   2/3/2025 Initial Diagnosis    Adenocarcinoma of pancreas (Multi)     2/18/2025 -  Chemotherapy    mFOLFIRINOX (Fluorouracil Continuous Infusion / Leucovorin / Irinotecan / Oxaliplatin), 14 Day Cycles     7/22/2025 - 7/22/2025 Chemotherapy    mFOLFIRINOX (Fluorouracil Continuous Infusion / Leucovorin / Irinotecan / Oxaliplatin), 14 Day Cycles        Other Contributing History  NSTEMI, CAD, stents, HLD, HTN    Subjective      INTERVAL HISTORY     Sathish Iqbal \"Bill\" is a 59 y.o. male who presents today for follow up of pancreatic adenocarcinoma. Patient of Ashley Mesa MD currently on FOLFIRINOX. Here today for toxicity check in between C2 " and C3.     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   Doing as expected.   Some nausea and constipation. No vomiting.   Poor appetite.   Lower abdominal pain continues - same since diagnosis - asking for imaging.   He has taken a couple antiemetics.   He is using dulcolax.   Has been doing Creon.   Had some dry heaving today after his coffee and started to smoke a cigarette but couldn't finish it.   No signs of infection.   He would like some IVF today.     Review of Systems   Constitutional:  Positive for appetite change and fatigue. Negative for chills, fever and unexpected weight change.   HENT:  Negative.     Eyes: Negative.    Respiratory: Negative.     Cardiovascular: Negative.    Gastrointestinal:  Positive for abdominal pain, constipation and nausea. Negative for diarrhea and vomiting.   Endocrine: Negative.    Genitourinary: Negative.     Musculoskeletal: Negative.    Skin: Negative.    Neurological: Negative.    Hematological: Negative.    Psychiatric/Behavioral: Negative.         Objective      There were no vitals taken for this visit.  BSA: There is no height or weight on file to calculate BSA.    Wt Readings from Last 5 Encounters:   03/06/25 86.1 kg (189 lb 13.1 oz)   03/04/25 86.8 kg (191 lb 5.8 oz)   03/04/25 86.8 kg (191 lb 5.8 oz)   03/03/25 87.9 kg (193 lb 10.8 oz)   02/25/25 86.8 kg (191 lb 5.8 oz)     Performance Status:  ECOG Score: 0- Fully active, able to carry on all pre-disease performance w/o restriction.  Karnofsky Score: 90 - Able to carry on normal activity; minor signs or symptoms of disease     PHYSICAL EXAM   Physical Exam  Constitutional:       General: He is not in acute distress.     Appearance: Normal appearance. He is not toxic-appearing.   HENT:      Head: Normocephalic and atraumatic.   Eyes:      Pupils: Pupils are equal, round, and reactive to light.   Pulmonary:      Effort: Pulmonary effort is normal.    Musculoskeletal:         General: Normal range of motion.      Cervical back: Normal range of motion.      Right lower leg: No edema.      Left lower leg: No edema.   Neurological:      General: No focal deficit present.      Mental Status: He is alert and oriented to person, place, and time.      Motor: No weakness.   Psychiatric:         Mood and Affect: Mood normal.         Behavior: Behavior normal.         Thought Content: Thought content normal.         Judgment: Judgment normal.         Allergies  No Known Allergies   Medications  Current Outpatient Medications   Medication Instructions    aspirin 81 mg, oral, Daily    atorvastatin (LIPITOR) 40 mg, oral, Nightly    clopidogrel (PLAVIX) 75 mg, oral, Daily    lipase-protease-amylase (Creon) 24,000-76,000 -120,000 unit capsule Take 2 capsules with meals Take 1 capsule with snacks    loperamide (Imodium) 2 mg capsule Take 2 capsules (4 mg) by mouth with the first episode of diarrhea and 1 capsule (2 mg) by mouth with any additional episodes. Maximum 8 capsules (16 mg) per day.    losartan (COZAAR) 50 mg, oral, Daily    metoprolol succinate XL (TOPROL-XL) 50 mg, oral, Daily    nitroglycerin (NITROSTAT) 0.4 mg, sublingual, Every 5 min PRN    ondansetron (ZOFRAN) 8 mg, oral, Every 8 hours PRN    oxyCODONE (ROXICODONE) 5 mg, oral, Every 4 hours PRN    prochlorperazine (COMPAZINE) 10 mg, oral, Every 6 hours PRN    sildenafil (VIAGRA) 50 mg, As needed        Diagnostic Results   RESULTS     Results for orders placed or performed in visit on 03/03/25 (from the past 96 hours)   CBC and Auto Differential   Result Value Ref Range    WBC 5.3 4.4 - 11.3 x10*3/uL    RBC 4.44 (L) 4.50 - 5.90 x10*6/uL    Hemoglobin 13.3 (L) 13.5 - 17.5 g/dL    Hematocrit 39.3 (L) 41.0 - 52.0 %    MCV 89 80 - 100 fL    MCH 30.0 26.0 - 34.0 pg    MCHC 33.8 32.0 - 36.0 g/dL    RDW 12.1 11.5 - 14.5 %    Platelets 210 150 - 450 x10*3/uL    Neutrophils % 56.1 40.0 - 80.0 %    Immature Granulocytes  %, Automated 0.2 0.0 - 0.9 %    Lymphocytes % 32.4 13.0 - 44.0 %    Monocytes % 9.4 2.0 - 10.0 %    Eosinophils % 1.5 0.0 - 6.0 %    Basophils % 0.4 0.0 - 2.0 %    Neutrophils Absolute 3.00 1.20 - 7.70 x10*3/uL    Immature Granulocytes Absolute, Automated 0.01 0.00 - 0.70 x10*3/uL    Lymphocytes Absolute 1.73 1.20 - 4.80 x10*3/uL    Monocytes Absolute 0.50 0.10 - 1.00 x10*3/uL    Eosinophils Absolute 0.08 0.00 - 0.70 x10*3/uL    Basophils Absolute 0.02 0.00 - 0.10 x10*3/uL   Comprehensive metabolic panel   Result Value Ref Range    Glucose 123 (H) 74 - 99 mg/dL    Sodium 140 136 - 145 mmol/L    Potassium 3.5 3.5 - 5.3 mmol/L    Chloride 105 98 - 107 mmol/L    Bicarbonate 29 21 - 32 mmol/L    Anion Gap 10 10 - 20 mmol/L    Urea Nitrogen 9 6 - 23 mg/dL    Creatinine 0.76 0.50 - 1.30 mg/dL    eGFR >90 >60 mL/min/1.73m*2    Calcium 8.9 8.6 - 10.3 mg/dL    Albumin 3.8 3.4 - 5.0 g/dL    Alkaline Phosphatase 61 33 - 136 U/L    Total Protein 6.0 (L) 6.4 - 8.2 g/dL    AST 19 9 - 39 U/L    Bilirubin, Total 0.5 0.0 - 1.2 mg/dL    ALT 31 10 - 52 U/L       Recent Imaging     Assessment/Plan   ASSESSMENT     Sathish Iqbal is a 59 y.o. male with invasive adenocarcinoma of pancreas, MSI-S diagnosed in January 2025 after present with abdominal pain who presents in follow up for toxicity check today after his second cycle of neoadjuvant chemotherapy last week. He started neoadjuvant mFOLFIRINOX on 2/18/2025. DPYD Phenotype: Normal Metabolizer, DPYD Activity Score: 2.0. Has copy of UGT1A1 gene and diarrhea so Irinotecan was reduced by 25%. Genetics referral was placed and appointment is scheduled in March *** . Somatic testing completed - no pathologic variants.     *** Patient experiencing expected side effects, no concerning toxicities. Feeling fatigued. Having constipation and nausea. Abdominal pain since diagnosis persists. He is asking for a scan. Explained that it is too soon to scan and the pain is likely related to cancer  and will hopefully improve with treatment. We will continue to monitor his pain. He isn't optimizing his antiemetics. We discussed using them more regularly and premedicating before meals. He is taking Creon. He is using Dulcolax when he goes two days without BM. Recommended daily Miralax unless he develops loose stools and Dulcolax as needed if no BM in two days. His regular routine is about 2 times a day. I believe constipation may also be contributing to his nausea and abd pain. He would like IVF today and is scheduled in Infusion - I will give NS Bolus with IV dex and zofran. No need for blood work today.     *** Briefly reviewed reasoning for genetics consult and explained that genetics counselor will provide all the information needed regarding testing and then will also provide recommendations if his 3 children would require testing.      PLAN     # Pancreatic cancer  - Continue FOLFIRINOX every 2 weeks as scheduled - C3 due 3/18  - Genetics consult scheduled 3/10 ***  - Dietician is following  - Continue Creon with meals     # Constipation  - Start Miralax daily - hold for loose stools  - Dulcolax as needed for no BM after 2 days  - PO hydration     # Nausea/ Decreased Appetite  - Zofran and Compazine as needed  - Try premedicating with antiemetics prior to meals  - Add protein supplementation  - 1L NS today in Infusion  - IV Zofran and Dex today in Infusion     # Fatigue  - Try adding in exercise     Follow up as scheduled.     There are no diagnoses linked to this encounter.    Venous Access Orders  mFOLFIRINOX (Fluorouracil Continuous Infusion / Leucovorin / Irinotecan / Oxaliplatin), 14 Day Cycles    Patient verbalizes understanding of above plan. Time provided for patient's questions. All questions answered to patient's satisfaction in office. Patient instructed to reach out for any new concerning issues at [phone].    Rolanda Gallardo MSN, APRN, A-GNP-C, AOP  [facility]  [place]  Division of Hematology & Medical Oncology   [facility]   [address] [place] [address]  Phone: [phone]  Available via [employer] Secure Chat  [email or web address]

## 2025-03-10 ENCOUNTER — TELEPHONE (OUTPATIENT)
Dept: HEMATOLOGY/ONCOLOGY | Facility: CLINIC | Age: 60
End: 2025-03-10
Payer: COMMERCIAL

## 2025-03-10 ENCOUNTER — TELEMEDICINE CLINICAL SUPPORT (OUTPATIENT)
Dept: GENETICS | Facility: HOSPITAL | Age: 60
End: 2025-03-10
Payer: COMMERCIAL

## 2025-03-10 DIAGNOSIS — Z13.71 ENCOUNTER FOR NONPROCREATIVE GENETIC COUNSELING AND TESTING: Primary | ICD-10-CM

## 2025-03-10 DIAGNOSIS — C25.9 ADENOCARCINOMA OF PANCREAS (MULTI): ICD-10-CM

## 2025-03-10 DIAGNOSIS — Z71.83 ENCOUNTER FOR NONPROCREATIVE GENETIC COUNSELING AND TESTING: Primary | ICD-10-CM

## 2025-03-10 PROCEDURE — 96041 GENETIC COUNSELING SVC EA 30: CPT | Performed by: GENETIC COUNSELOR, MS

## 2025-03-10 NOTE — TELEPHONE ENCOUNTER
"Spoke with the patient. States he talked with Dr. Mesa and the appointments on 3.11.25 were \"just in case.\" Pt states he feels good, is at work today, and doesn't need them. Reviewed upcoming appointments and patient will attend 3.18.25 appointments as scheduled. Encouraged patient to call office if he has any further questions or concerns.     3.1.25 appointments cancelled  "

## 2025-03-10 NOTE — PROGRESS NOTES
"A telephone (audio only) visit between the patient (at the originating site) and provider (at the distant site) was utilized to provide this telehealth service. Verbal consent was requested and obtained from Sathish Iqbal on this date, March 10, 2025 for a telehealth visit.  Patient confirmed they were located in the [facility] at the time of the appointment.    History of Present Illness:  Sathish Iqbal \"Bill\" is a 59 y.o. male with a recent diagnosis of pancreatic cancer. He was referred to the Cancer Genetics Clinic at [facility] by Dr. Mesa.  Mr. Iqbal is interested in genetic testing to clarify his personal risk for cancer, as well as the risks to his family members.    Cancer Medical History:  Personal history of cancer? Yes   Type: pancreatic cancer  Age at diagnosis: 59  Summary:  58 yo male with recently diagnosed invasive adenocarcinoma of pancreas, MMR proficient. Diagnosed in 2025. He is being treated with neoadjuvant chemotherapy.     He does not have a past medical history of pancreatitis.     History of other cancers: None     Prior genetic testing: None     Cancer screening history:  Prostate cancer screening?  PSA checked in , wnl.   Colonoscopy? None   He has not had any other type of colon cancer screening.    Dermatology? Not regular.  He saw derm 2 years ago due to a cyst on his shoulder and a cyst on the back of his head, both benign per patient report. Denies history of dark pigmented freckling on the lips.  Other cancer screening? No    Exposure hx:  He does have a history of smoking cigarettes. Reports he has cut back, and now smokes 1/2 ppd. He has a 10.8 pack-year smoking history.    Family history:  A 4-generation pedigree was obtained and was significant for the following:   - Patient, pancreatic cancer at 59;  - Niece, rare sarcoma in her mid-late 40s,  in her mid-late 40s.     No cancer history for his two sisters, parents, or children. He has three " adult children, age 28, who are non-identical triplets (2 girls, 1 boy).    There is very limited information about the rest of his maternal and paternal family history (aunts, uncles, cousins, grandparents, etc), as these other relatives lived in [facility].    There is no known family history of pancreatitis.     Maternal and paternal ancestry is Urdu. There is no known Ashkenazi Tenriism ancestry or consanguinity.      Discussion:  Mr. Iqbal is a 59 y.o. male with a recent diagnosis of pancreatic cancer. Based on his personal diagnosis, Mr. Iqbal meets NCCN criteria for testing of pancreatic cancer susceptibility genes, including BRCA1 and BRCA2. Genetic testing is considered medically necessary, as the results may impact treatment options (e.g. eligibility for a PARP inhibitor). Genetic testing for Mr. Iqbal may also help inform cancer risks for his relatives, including his three children.       We reviewed genes and the concept of hereditary cancer. We discussed that most cancers are not due to an inherited genetic susceptibility. However, in about 5-10% of cases of cancer, there is an inherited genetic mutation that can make a person more susceptible to developing certain types of cancer. Within families with a genetic predisposition to cancer, we often see certain patterns, such as multiple family members with cancer and cancers occurring in multiple generations. In addition, earlier onset and/or bilateral cancers are suggestive of an inherited predisposition. There also tends to be a clustering of certain types of cancer in these families, such as breast cancer and pancreatic cancer.     Approximately 5-10% of pancreatic cancer is due to an inherited mutation. We discussed that there are no single-genes that are associated with only pancreatic cancer, but rather pancreatic cancer is an associated cancer with other hereditary cancer predisposition syndromes.       There are multiple genes associated with  increased pancreatic cancer risk including BUBBA, BRCA1, BRCA2, CDKN2A, the Galarza syndrome genes, PALB2, STK11, and TP53. Each of these genes is associated with its own cancer risks, and some increase cancer risk to a greater extent than others. If an individual tests positive for a mutation in one of these genes, implications for family would be dependent on the specific result as well as the family history of cancer.      Mr. Iqbal was counseled about hereditary cancer susceptibility including cancer risks, options for increased screening and/or risk reduction, genetic testing, possible results, and potential implications for other family members. We discussed that, in some cases, genetic testing may impact treatment options.      We discussed performing testing of pancreatic cancer susceptibility genes in the context of a larger panel test that looks at multiple different genes that have been shown to increase cancer risk. We discussed various options for genetic testing, including smaller and larger panel options.  He is most interested in the [employer] CancerNext +[facility]HARES panel, which analyzes 39 genes associated with hereditary cancer (APC, BUBBA, AXIN2, BAP1, BARD1, BMPR1A, BRCA1, BRCA2, BRIP1, CDH1, CDKN2A, CHEK2, EPCAM, FH, FLCN, GREM1, HOXB13, MBD4, MET, MLH1, MSH2, MSH3, MSH6, MUTYH, NF1, NTHL1, PALB2, PMS2, POLD1, POLE, PTEN, RAD51C, RAD51D, SMAD4, STK11, TP53, TSC1, TSC2, VHL).  Of note, there is no known personal or family history of pancreatitis; thus, we are not including genes associated with hereditary pancreatitis with his testing.     After a discussion about the risks, benefits, and limitations of genetic testing, Mr. Iqbal elected to pursue genetic testing using the 39-gene [employer] CancerNext +Icar[facility]ight panel.  Verbal consent for testing was obtained.  He plans to have his blood drawn for testing at [facility] in the near future ([facility] in [place] on Mon 3/17, as he already has a blood draw  scheduled that day). The sample will then be sent out to [facility] for analysis.  Results are typically available within 3-4 weeks, and Mr. Iqbal will be contacted (or scheduled for a follow-up virtual visit) to discuss his test results once available. At that time, we will discuss the implications for both Mr. Iqbal and his family members.     Plan:  Patient elected to proceed with genetic testing via the 34-gene CancerNext +ProtonMediansight panel through [email or web address].     Note: He plans to have his blood drawn for testing at  facility in the near future ([facility] in [place] on Mon 3/17, as he already has a blood draw scheduled that day). He will be sent a test kit in the mail, and will bring this with him on 3/17. The sample will then be sent to [email or web address] for analysis. Results are typically available within 3-4 weeks.     2. We will discuss the panel results at a follow-up virtual visit or results will be called out once available.      3. Patient was encouraged to call with any updates, questions, or concerns. He was provided with my direct contact number: [phone].        Mearry Zambrano MGC, [facility]  Licensed Genetic Counselor  [facility]  Ph: [phone]     Total time spent on day of encounter: 56 minutes (41 minutes with patient, 15 minutes on pre/post patient care activities, including documentation).

## 2025-03-11 ENCOUNTER — APPOINTMENT (OUTPATIENT)
Dept: HEMATOLOGY/ONCOLOGY | Facility: CLINIC | Age: 60
End: 2025-03-11
Payer: COMMERCIAL

## 2025-03-17 ENCOUNTER — LAB (OUTPATIENT)
Dept: LAB | Facility: CLINIC | Age: 60
End: 2025-03-17
Payer: COMMERCIAL

## 2025-03-17 DIAGNOSIS — C25.9 ADENOCARCINOMA OF PANCREAS (MULTI): Primary | ICD-10-CM

## 2025-03-17 DIAGNOSIS — C25.9 ADENOCARCINOMA OF PANCREAS (MULTI): ICD-10-CM

## 2025-03-17 LAB
ALBUMIN SERPL BCP-MCNC: 3.7 G/DL (ref 3.4–5)
ALP SERPL-CCNC: 66 U/L (ref 33–136)
ALT SERPL W P-5'-P-CCNC: 52 U/L (ref 10–52)
ANION GAP SERPL CALC-SCNC: 8 MMOL/L (ref 10–20)
AST SERPL W P-5'-P-CCNC: 22 U/L (ref 9–39)
BASOPHILS # BLD AUTO: 0 X10*3/UL (ref 0–0.1)
BASOPHILS NFR BLD AUTO: 0 %
BILIRUB SERPL-MCNC: 0.5 MG/DL (ref 0–1.2)
BUN SERPL-MCNC: 8 MG/DL (ref 6–23)
CALCIUM SERPL-MCNC: 8.6 MG/DL (ref 8.6–10.3)
CHLORIDE SERPL-SCNC: 107 MMOL/L (ref 98–107)
CO2 SERPL-SCNC: 27 MMOL/L (ref 21–32)
CREAT SERPL-MCNC: 0.86 MG/DL (ref 0.5–1.3)
EGFRCR SERPLBLD CKD-EPI 2021: >90 ML/MIN/1.73M*2
EOSINOPHIL # BLD AUTO: 0.03 X10*3/UL (ref 0–0.7)
EOSINOPHIL NFR BLD AUTO: 0.7 %
ERYTHROCYTE [DISTWIDTH] IN BLOOD BY AUTOMATED COUNT: 13.6 % (ref 11.5–14.5)
GLUCOSE SERPL-MCNC: 155 MG/DL (ref 74–99)
HCT VFR BLD AUTO: 36.6 % (ref 41–52)
HGB BLD-MCNC: 12.5 G/DL (ref 13.5–17.5)
HOLD SPECIMEN: NORMAL
IMM GRANULOCYTES # BLD AUTO: 0 X10*3/UL (ref 0–0.7)
IMM GRANULOCYTES NFR BLD AUTO: 0 % (ref 0–0.9)
LYMPHOCYTES # BLD AUTO: 1.5 X10*3/UL (ref 1.2–4.8)
LYMPHOCYTES NFR BLD AUTO: 35.5 %
MCH RBC QN AUTO: 30 PG (ref 26–34)
MCHC RBC AUTO-ENTMCNC: 34.2 G/DL (ref 32–36)
MCV RBC AUTO: 88 FL (ref 80–100)
MONOCYTES # BLD AUTO: 0.35 X10*3/UL (ref 0.1–1)
MONOCYTES NFR BLD AUTO: 8.3 %
NEUTROPHILS # BLD AUTO: 2.34 X10*3/UL (ref 1.2–7.7)
NEUTROPHILS NFR BLD AUTO: 55.5 %
PLATELET # BLD AUTO: 201 X10*3/UL (ref 150–450)
POTASSIUM SERPL-SCNC: 3.1 MMOL/L (ref 3.5–5.3)
PROT SERPL-MCNC: 5.8 G/DL (ref 6.4–8.2)
RBC # BLD AUTO: 4.17 X10*6/UL (ref 4.5–5.9)
SODIUM SERPL-SCNC: 139 MMOL/L (ref 136–145)
WBC # BLD AUTO: 4.2 X10*3/UL (ref 4.4–11.3)

## 2025-03-17 PROCEDURE — 80053 COMPREHEN METABOLIC PANEL: CPT

## 2025-03-17 PROCEDURE — 85025 COMPLETE CBC W/AUTO DIFF WBC: CPT

## 2025-03-17 PROCEDURE — 86301 IMMUNOASSAY TUMOR CA 19-9: CPT

## 2025-03-18 ENCOUNTER — INFUSION (OUTPATIENT)
Dept: HEMATOLOGY/ONCOLOGY | Facility: CLINIC | Age: 60
End: 2025-03-18
Payer: COMMERCIAL

## 2025-03-18 ENCOUNTER — SOCIAL WORK (OUTPATIENT)
Dept: HEMATOLOGY/ONCOLOGY | Facility: CLINIC | Age: 60
End: 2025-03-18

## 2025-03-18 ENCOUNTER — OFFICE VISIT (OUTPATIENT)
Dept: HEMATOLOGY/ONCOLOGY | Facility: CLINIC | Age: 60
End: 2025-03-18
Payer: COMMERCIAL

## 2025-03-18 VITALS
DIASTOLIC BLOOD PRESSURE: 70 MMHG | SYSTOLIC BLOOD PRESSURE: 113 MMHG | RESPIRATION RATE: 17 BRPM | WEIGHT: 185.41 LBS | BODY MASS INDEX: 27.34 KG/M2 | HEART RATE: 84 BPM | TEMPERATURE: 97 F | OXYGEN SATURATION: 98 %

## 2025-03-18 DIAGNOSIS — C25.9 ADENOCARCINOMA OF PANCREAS (MULTI): ICD-10-CM

## 2025-03-18 DIAGNOSIS — C25.9 ADENOCARCINOMA OF PANCREAS (MULTI): Primary | ICD-10-CM

## 2025-03-18 LAB — CANCER AG19-9 SERPL-ACNC: <4 U/ML

## 2025-03-18 PROCEDURE — 3074F SYST BP LT 130 MM HG: CPT | Performed by: INTERNAL MEDICINE

## 2025-03-18 PROCEDURE — 96417 CHEMO IV INFUS EACH ADDL SEQ: CPT

## 2025-03-18 PROCEDURE — 96413 CHEMO IV INFUSION 1 HR: CPT

## 2025-03-18 PROCEDURE — 2500000004 HC RX 250 GENERAL PHARMACY W/ HCPCS (ALT 636 FOR OP/ED): Performed by: INTERNAL MEDICINE

## 2025-03-18 PROCEDURE — 3078F DIAST BP <80 MM HG: CPT | Performed by: INTERNAL MEDICINE

## 2025-03-18 PROCEDURE — 96368 THER/DIAG CONCURRENT INF: CPT

## 2025-03-18 PROCEDURE — 2500000001 HC RX 250 WO HCPCS SELF ADMINISTERED DRUGS (ALT 637 FOR MEDICARE OP): Performed by: INTERNAL MEDICINE

## 2025-03-18 PROCEDURE — 99215 OFFICE O/P EST HI 40 MIN: CPT | Mod: 25 | Performed by: INTERNAL MEDICINE

## 2025-03-18 PROCEDURE — 96416 CHEMO PROLONG INFUSE W/PUMP: CPT

## 2025-03-18 PROCEDURE — 96376 TX/PRO/DX INJ SAME DRUG ADON: CPT

## 2025-03-18 PROCEDURE — 96375 TX/PRO/DX INJ NEW DRUG ADDON: CPT | Mod: INF

## 2025-03-18 PROCEDURE — 99215 OFFICE O/P EST HI 40 MIN: CPT | Performed by: INTERNAL MEDICINE

## 2025-03-18 PROCEDURE — G2211 COMPLEX E/M VISIT ADD ON: HCPCS | Performed by: INTERNAL MEDICINE

## 2025-03-18 PROCEDURE — 96415 CHEMO IV INFUSION ADDL HR: CPT

## 2025-03-18 RX ORDER — PROCHLORPERAZINE EDISYLATE 5 MG/ML
10 INJECTION INTRAMUSCULAR; INTRAVENOUS EVERY 6 HOURS PRN
OUTPATIENT
Start: 2025-04-15

## 2025-03-18 RX ORDER — EPINEPHRINE 0.3 MG/.3ML
0.3 INJECTION SUBCUTANEOUS EVERY 5 MIN PRN
OUTPATIENT
Start: 2025-04-15

## 2025-03-18 RX ORDER — ATROPINE SULFATE 0.4 MG/ML
0.4 INJECTION, SOLUTION ENDOTRACHEAL; INTRAMEDULLARY; INTRAMUSCULAR; INTRAVENOUS; SUBCUTANEOUS
Status: DISCONTINUED | OUTPATIENT
Start: 2025-03-18 | End: 2025-03-18 | Stop reason: HOSPADM

## 2025-03-18 RX ORDER — DIPHENHYDRAMINE HCL 25 MG
50 CAPSULE ORAL ONCE
OUTPATIENT
Start: 2025-04-15 | End: 2025-04-15

## 2025-03-18 RX ORDER — ATROPINE SULFATE 0.1 MG/ML
0.4 INJECTION INTRAVENOUS ONCE
OUTPATIENT
Start: 2025-04-01 | End: 2025-04-01

## 2025-03-18 RX ORDER — PALONOSETRON 0.05 MG/ML
250 INJECTION, SOLUTION INTRAVENOUS ONCE
OUTPATIENT
Start: 2025-04-03 | End: 2025-04-03

## 2025-03-18 RX ORDER — DEXAMETHASONE 6 MG/1
12 TABLET ORAL ONCE
Status: COMPLETED | OUTPATIENT
Start: 2025-03-18 | End: 2025-03-18

## 2025-03-18 RX ORDER — DIPHENHYDRAMINE HYDROCHLORIDE 50 MG/ML
50 INJECTION, SOLUTION INTRAMUSCULAR; INTRAVENOUS AS NEEDED
OUTPATIENT
Start: 2025-04-03

## 2025-03-18 RX ORDER — POTASSIUM CHLORIDE 29.8 MG/ML
40 INJECTION INTRAVENOUS ONCE
Status: COMPLETED | OUTPATIENT
Start: 2025-03-18 | End: 2025-03-18

## 2025-03-18 RX ORDER — POTASSIUM CHLORIDE 750 MG/1
20 TABLET, FILM COATED, EXTENDED RELEASE ORAL DAILY
Qty: 60 TABLET | Refills: 3 | Status: SHIPPED | OUTPATIENT
Start: 2025-03-18 | End: 2026-03-18

## 2025-03-18 RX ORDER — DEXAMETHASONE 6 MG/1
12 TABLET ORAL ONCE
OUTPATIENT
Start: 2025-04-15 | End: 2025-04-15

## 2025-03-18 RX ORDER — PALONOSETRON 0.05 MG/ML
250 INJECTION, SOLUTION INTRAVENOUS ONCE
OUTPATIENT
Start: 2025-05-01 | End: 2025-05-01

## 2025-03-18 RX ORDER — FAMOTIDINE 10 MG/ML
20 INJECTION, SOLUTION INTRAVENOUS ONCE AS NEEDED
Status: DISCONTINUED | OUTPATIENT
Start: 2025-03-18 | End: 2025-03-18 | Stop reason: HOSPADM

## 2025-03-18 RX ORDER — ATROPINE SULFATE 0.1 MG/ML
0.4 INJECTION INTRAVENOUS ONCE
OUTPATIENT
Start: 2025-04-15 | End: 2025-04-15

## 2025-03-18 RX ORDER — ALBUTEROL SULFATE 0.83 MG/ML
3 SOLUTION RESPIRATORY (INHALATION) AS NEEDED
OUTPATIENT
Start: 2025-04-15

## 2025-03-18 RX ORDER — DIPHENHYDRAMINE HCL 25 MG
50 CAPSULE ORAL ONCE
OUTPATIENT
Start: 2025-04-29 | End: 2025-04-29

## 2025-03-18 RX ORDER — DEXAMETHASONE 6 MG/1
12 TABLET ORAL ONCE
OUTPATIENT
Start: 2025-04-29 | End: 2025-04-29

## 2025-03-18 RX ORDER — ONDANSETRON HYDROCHLORIDE 8 MG/1
16 TABLET, FILM COATED ORAL ONCE
Status: COMPLETED | OUTPATIENT
Start: 2025-03-18 | End: 2025-03-18

## 2025-03-18 RX ORDER — ALBUTEROL SULFATE 0.83 MG/ML
3 SOLUTION RESPIRATORY (INHALATION) AS NEEDED
OUTPATIENT
Start: 2025-05-01

## 2025-03-18 RX ORDER — HEPARIN 100 UNIT/ML
500 SYRINGE INTRAVENOUS AS NEEDED
Status: CANCELLED | OUTPATIENT
Start: 2025-03-18

## 2025-03-18 RX ORDER — EPINEPHRINE 0.3 MG/.3ML
0.3 INJECTION SUBCUTANEOUS EVERY 5 MIN PRN
Status: DISCONTINUED | OUTPATIENT
Start: 2025-03-18 | End: 2025-03-18 | Stop reason: HOSPADM

## 2025-03-18 RX ORDER — DEXAMETHASONE 6 MG/1
12 TABLET ORAL ONCE
OUTPATIENT
Start: 2025-04-01 | End: 2025-04-01

## 2025-03-18 RX ORDER — POTASSIUM CHLORIDE 29.8 MG/ML
40 INJECTION INTRAVENOUS ONCE
Status: CANCELLED | OUTPATIENT
Start: 2025-03-18 | End: 2025-03-18

## 2025-03-18 RX ORDER — PROCHLORPERAZINE EDISYLATE 5 MG/ML
10 INJECTION INTRAMUSCULAR; INTRAVENOUS EVERY 6 HOURS PRN
OUTPATIENT
Start: 2025-04-29

## 2025-03-18 RX ORDER — DIPHENHYDRAMINE HCL 25 MG
50 CAPSULE ORAL ONCE
Status: COMPLETED | OUTPATIENT
Start: 2025-03-18 | End: 2025-03-18

## 2025-03-18 RX ORDER — FAMOTIDINE 10 MG/ML
20 INJECTION, SOLUTION INTRAVENOUS ONCE AS NEEDED
OUTPATIENT
Start: 2025-04-29

## 2025-03-18 RX ORDER — ATROPINE SULFATE 0.4 MG/ML
0.4 INJECTION, SOLUTION ENDOTRACHEAL; INTRAMEDULLARY; INTRAMUSCULAR; INTRAVENOUS; SUBCUTANEOUS ONCE
Status: COMPLETED | OUTPATIENT
Start: 2025-03-18 | End: 2025-03-18

## 2025-03-18 RX ORDER — FAMOTIDINE 10 MG/ML
20 INJECTION, SOLUTION INTRAVENOUS ONCE AS NEEDED
OUTPATIENT
Start: 2025-05-01

## 2025-03-18 RX ORDER — ATROPINE SULFATE 0.4 MG/ML
0.4 INJECTION, SOLUTION ENDOTRACHEAL; INTRAMEDULLARY; INTRAMUSCULAR; INTRAVENOUS; SUBCUTANEOUS
OUTPATIENT
Start: 2025-04-01

## 2025-03-18 RX ORDER — ONDANSETRON HYDROCHLORIDE 8 MG/1
16 TABLET, FILM COATED ORAL ONCE
OUTPATIENT
Start: 2025-04-15 | End: 2025-04-15

## 2025-03-18 RX ORDER — FAMOTIDINE 10 MG/ML
20 INJECTION, SOLUTION INTRAVENOUS ONCE AS NEEDED
OUTPATIENT
Start: 2025-04-15

## 2025-03-18 RX ORDER — ATROPINE SULFATE 0.1 MG/ML
0.4 INJECTION INTRAVENOUS ONCE
OUTPATIENT
Start: 2025-04-29 | End: 2025-04-29

## 2025-03-18 RX ORDER — ATROPINE SULFATE 0.4 MG/ML
0.4 INJECTION, SOLUTION ENDOTRACHEAL; INTRAMEDULLARY; INTRAMUSCULAR; INTRAVENOUS; SUBCUTANEOUS
OUTPATIENT
Start: 2025-04-15

## 2025-03-18 RX ORDER — LORAZEPAM 2 MG/ML
1 INJECTION INTRAMUSCULAR AS NEEDED
OUTPATIENT
Start: 2025-04-01

## 2025-03-18 RX ORDER — DIPHENHYDRAMINE HYDROCHLORIDE 50 MG/ML
50 INJECTION, SOLUTION INTRAMUSCULAR; INTRAVENOUS AS NEEDED
Status: DISCONTINUED | OUTPATIENT
Start: 2025-03-18 | End: 2025-03-18 | Stop reason: HOSPADM

## 2025-03-18 RX ORDER — FAMOTIDINE 10 MG/ML
20 INJECTION, SOLUTION INTRAVENOUS ONCE AS NEEDED
OUTPATIENT
Start: 2025-04-17

## 2025-03-18 RX ORDER — DIPHENHYDRAMINE HCL 25 MG
50 CAPSULE ORAL ONCE
OUTPATIENT
Start: 2025-04-01 | End: 2025-04-01

## 2025-03-18 RX ORDER — FAMOTIDINE 10 MG/ML
20 INJECTION, SOLUTION INTRAVENOUS ONCE AS NEEDED
OUTPATIENT
Start: 2025-04-01

## 2025-03-18 RX ORDER — ALBUTEROL SULFATE 0.83 MG/ML
3 SOLUTION RESPIRATORY (INHALATION) AS NEEDED
OUTPATIENT
Start: 2025-04-17

## 2025-03-18 RX ORDER — ALBUTEROL SULFATE 0.83 MG/ML
3 SOLUTION RESPIRATORY (INHALATION) AS NEEDED
OUTPATIENT
Start: 2025-04-03

## 2025-03-18 RX ORDER — EPINEPHRINE 0.3 MG/.3ML
0.3 INJECTION SUBCUTANEOUS EVERY 5 MIN PRN
OUTPATIENT
Start: 2025-04-29

## 2025-03-18 RX ORDER — DIPHENHYDRAMINE HYDROCHLORIDE 50 MG/ML
50 INJECTION, SOLUTION INTRAMUSCULAR; INTRAVENOUS AS NEEDED
OUTPATIENT
Start: 2025-04-15

## 2025-03-18 RX ORDER — LORAZEPAM 2 MG/ML
1 INJECTION INTRAMUSCULAR AS NEEDED
OUTPATIENT
Start: 2025-04-29

## 2025-03-18 RX ORDER — EPINEPHRINE 0.3 MG/.3ML
0.3 INJECTION SUBCUTANEOUS EVERY 5 MIN PRN
OUTPATIENT
Start: 2025-04-17

## 2025-03-18 RX ORDER — PROCHLORPERAZINE MALEATE 10 MG
10 TABLET ORAL EVERY 6 HOURS PRN
OUTPATIENT
Start: 2025-04-15

## 2025-03-18 RX ORDER — HEPARIN SODIUM,PORCINE/PF 10 UNIT/ML
50 SYRINGE (ML) INTRAVENOUS AS NEEDED
Status: CANCELLED | OUTPATIENT
Start: 2025-03-18

## 2025-03-18 RX ORDER — ONDANSETRON HYDROCHLORIDE 8 MG/1
16 TABLET, FILM COATED ORAL ONCE
OUTPATIENT
Start: 2025-04-29 | End: 2025-04-29

## 2025-03-18 RX ORDER — ATROPINE SULFATE 0.1 MG/ML
0.4 INJECTION INTRAVENOUS ONCE
Status: CANCELLED | OUTPATIENT
Start: 2025-03-18 | End: 2025-03-18

## 2025-03-18 RX ORDER — EPINEPHRINE 0.3 MG/.3ML
0.3 INJECTION SUBCUTANEOUS EVERY 5 MIN PRN
OUTPATIENT
Start: 2025-05-01

## 2025-03-18 RX ORDER — EPINEPHRINE 0.3 MG/.3ML
0.3 INJECTION SUBCUTANEOUS EVERY 5 MIN PRN
OUTPATIENT
Start: 2025-04-03

## 2025-03-18 RX ORDER — PROCHLORPERAZINE MALEATE 10 MG
10 TABLET ORAL EVERY 6 HOURS PRN
OUTPATIENT
Start: 2025-04-29

## 2025-03-18 RX ORDER — PALONOSETRON 0.05 MG/ML
250 INJECTION, SOLUTION INTRAVENOUS ONCE
OUTPATIENT
Start: 2025-04-17 | End: 2025-04-17

## 2025-03-18 RX ORDER — FAMOTIDINE 10 MG/ML
20 INJECTION, SOLUTION INTRAVENOUS ONCE AS NEEDED
OUTPATIENT
Start: 2025-04-03

## 2025-03-18 RX ORDER — LORAZEPAM 2 MG/ML
1 INJECTION INTRAMUSCULAR AS NEEDED
OUTPATIENT
Start: 2025-04-15

## 2025-03-18 RX ORDER — ONDANSETRON HYDROCHLORIDE 8 MG/1
16 TABLET, FILM COATED ORAL ONCE
OUTPATIENT
Start: 2025-04-01 | End: 2025-04-01

## 2025-03-18 RX ORDER — DIPHENHYDRAMINE HYDROCHLORIDE 50 MG/ML
50 INJECTION, SOLUTION INTRAMUSCULAR; INTRAVENOUS AS NEEDED
OUTPATIENT
Start: 2025-04-17

## 2025-03-18 RX ORDER — PROCHLORPERAZINE EDISYLATE 5 MG/ML
10 INJECTION INTRAMUSCULAR; INTRAVENOUS EVERY 6 HOURS PRN
OUTPATIENT
Start: 2025-04-01

## 2025-03-18 RX ORDER — DIPHENHYDRAMINE HYDROCHLORIDE 50 MG/ML
50 INJECTION, SOLUTION INTRAMUSCULAR; INTRAVENOUS AS NEEDED
OUTPATIENT
Start: 2025-05-01

## 2025-03-18 RX ORDER — EPINEPHRINE 0.3 MG/.3ML
0.3 INJECTION SUBCUTANEOUS EVERY 5 MIN PRN
OUTPATIENT
Start: 2025-04-01

## 2025-03-18 RX ORDER — ALBUTEROL SULFATE 0.83 MG/ML
3 SOLUTION RESPIRATORY (INHALATION) AS NEEDED
Status: DISCONTINUED | OUTPATIENT
Start: 2025-03-18 | End: 2025-03-18 | Stop reason: HOSPADM

## 2025-03-18 RX ORDER — PROCHLORPERAZINE MALEATE 10 MG
10 TABLET ORAL EVERY 6 HOURS PRN
OUTPATIENT
Start: 2025-04-01

## 2025-03-18 RX ORDER — DIPHENHYDRAMINE HYDROCHLORIDE 50 MG/ML
50 INJECTION, SOLUTION INTRAMUSCULAR; INTRAVENOUS AS NEEDED
OUTPATIENT
Start: 2025-04-01

## 2025-03-18 RX ORDER — DIPHENHYDRAMINE HYDROCHLORIDE 50 MG/ML
50 INJECTION, SOLUTION INTRAMUSCULAR; INTRAVENOUS AS NEEDED
OUTPATIENT
Start: 2025-04-29

## 2025-03-18 RX ORDER — NYSTATIN 100000 [USP'U]/ML
500000 SUSPENSION ORAL 4 TIMES DAILY
Qty: 280 ML | Refills: 3 | Status: SHIPPED | OUTPATIENT
Start: 2025-03-18 | End: 2025-04-17

## 2025-03-18 RX ORDER — ALBUTEROL SULFATE 0.83 MG/ML
3 SOLUTION RESPIRATORY (INHALATION) AS NEEDED
OUTPATIENT
Start: 2025-04-01

## 2025-03-18 RX ORDER — ATROPINE SULFATE 0.4 MG/ML
0.4 INJECTION, SOLUTION ENDOTRACHEAL; INTRAMEDULLARY; INTRAMUSCULAR; INTRAVENOUS; SUBCUTANEOUS
OUTPATIENT
Start: 2025-04-29

## 2025-03-18 RX ORDER — ALBUTEROL SULFATE 0.83 MG/ML
3 SOLUTION RESPIRATORY (INHALATION) AS NEEDED
OUTPATIENT
Start: 2025-04-29

## 2025-03-18 RX ADMIN — OXALIPLATIN 180 MG: 5 INJECTION, SOLUTION INTRAVENOUS at 11:26

## 2025-03-18 RX ADMIN — DEXTROSE MONOHYDRATE 240 MG: 50 INJECTION, SOLUTION INTRAVENOUS at 13:46

## 2025-03-18 RX ADMIN — ONDANSETRON HYDROCHLORIDE 16 MG: 8 TABLET, FILM COATED ORAL at 11:05

## 2025-03-18 RX ADMIN — DEXAMETHASONE 12 MG: 6 TABLET ORAL at 11:05

## 2025-03-18 RX ADMIN — ATROPINE SULFATE 0.4 MG: 0.4 INJECTION, SOLUTION INTRAVENOUS at 15:24

## 2025-03-18 RX ADMIN — FLUOROURACIL 5100 MG: 50 INJECTION, SOLUTION INTRAVENOUS at 15:34

## 2025-03-18 RX ADMIN — DIPHENHYDRAMINE HYDROCHLORIDE 50 MG: 25 CAPSULE ORAL at 11:05

## 2025-03-18 RX ADMIN — ATROPINE SULFATE 0.4 MG: 0.4 INJECTION, SOLUTION INTRAVENOUS at 13:42

## 2025-03-18 RX ADMIN — POTASSIUM CHLORIDE 40 MEQ: 29.8 INJECTION, SOLUTION INTRAVENOUS at 11:13

## 2025-03-18 ASSESSMENT — PAIN SCALES - GENERAL: PAINLEVEL_OUTOF10: 0-NO PAIN

## 2025-03-18 NOTE — PROGRESS NOTES
Patient ID: Hardy Iqbal is a 59 y.o. male.    Oncology History   Adenocarcinoma of pancreas (Multi)   2/3/2025 Initial Diagnosis    Adenocarcinoma of pancreas (Multi)     2/18/2025 -  Chemotherapy    mFOLFIRINOX (Fluorouracil Continuous Infusion / Leucovorin / Irinotecan / Oxaliplatin), 14 Day Cycles     7/22/2025 - 7/22/2025 Chemotherapy    mFOLFIRINOX (Fluorouracil Continuous Infusion / Leucovorin / Irinotecan / Oxaliplatin), 14 Day Cycles           Subjective    HPI  Mr. Sathish Iqbal is a 59 y.o. male presenting for follow up of pancreatic cancer. Blood work yesterday shows no significant cytopenias, and no electrolyte abnormalities.     Today, patient states the past two weeks he has been able to eat well, and has been having normal bowel movements with no help from medication. He states the first few days after treatment he had some nausea, mouth sores, and felt fatigued. He has had a few episodes of diarrhea, thinks it may have been due to the food he ate during that time. He wanted to discuss today food options for him to help when he is having issues with his stomach at times.     He denies rash, new abdominal pain, and leg swelling.    Patient's past medical history, surgical history, family history and social history reviewed.    Review of Systems:   Review of Systems:    Positive per HPI, otherwise negative.       Objective    BSA: 2.02 meters squared  /70 (BP Location: Right arm, Patient Position: Sitting)   Pulse 84   Temp 36.1 °C (97 °F) (Temporal)   Resp 17   Wt 84.1 kg (185 lb 6.5 oz)   SpO2 98%   BMI 27.34 kg/m²      Physical Exam  Gen: appears well in clinic, NAD  HEENT: atraumatic head, normocephalic, EOMI, conjunctiva normal  LUNG: no increased WOB, CTAB  CV: No JVD. RRR  GI: soft, NT, ND  LE: no LE edema  Skin: no obvious rashes or lesions on visible skin  Neuro: interactive, no focal deficits noted  Psych: normal mood and affect    Performance Status:  Symptomatic; fully  ambulatory    Labs/Imaging/Pathology: Personally reviewed reports and images in Epic electronic medical record system. Pertinent results as it related to the plan represented in below in assessment and plan.       Assessment/Plan   Invasive adenocarcinoma of pancreas, MSI-S  - Initial CA 19-9 less than 4   - Patient was initially presented to the emergency room on 1/2/25 for worsening abdominal pain.   - CT abdomen pelvis at that time revealed a defined mass involving the uncinate process of the pancreas  - biopsy on 1/29/25, which showed invasive adenocarcinoma poorly differentiated. MSI status intact  - CT of the chest on 1/24/25 showed no clear evidence of metastases  - met with Dr. Castro on 1/23/25 and presents to discuss neoadjuvant  chemotherapy today  - patient has a good PS and discussed FOLFIRINOS with 5-FU, oxaliplatin, and irinotecan  - Reviewed side effects, consent signed.   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 He can cancel if he prefers.   - Labs unremarkable.    - Prescription for Zofran refilled at a higher quantity of 60 mg.    - RTC with me in two weeks    03/18/2025:  -Proceed with treatment today.   -Patient overall tolerated reduced treatment dose during last infusion.   -Potassium is low we will supplement with 40mEq today, and we will start him on 20 mEq daily with the prescription sent to pharmacy.   -GI toxicity manageable at this time, patient complains of no new pain or abdominal issues.   - will continue intensive monitoring for chemo side effects    -RTC in 2  "weeks for next cycle.     Reviewed ongoing medical problems and how they relate to his malignancy, will continue long term monitoring.    RTC in 2 weeks with me  This note has been transcribed using a medical scribe and there is a possibility of unintentional typing misprints     Sathish \"Hardy\" was seen today for follow-up and op infusion.  Diagnoses and all orders for this visit:  Adenocarcinoma of pancreas (Multi) (Primary)  -     Clinic Appointment Request  -     nystatin (Mycostatin) 100,000 unit/mL suspension; Take 5 mL (500,000 Units) by mouth 4 times a day. Swish in mouth and swallow.  -     potassium chloride CR (Klor-Con) 10 mEq ER tablet; Take 2 tablets (20 mEq) by mouth once daily. Do not crush, chew, or split.  -     Cancer antigen 19-9; Future  -     Clinic Appointment Request; Future  -     Infusion Appointment Request; Future  -     Oncology Line Draw Appointment Request; Future  -     CBC and Auto Differential; Future  -     Comprehensive metabolic panel; Future  -     Infusion Appointment Request; Future  -     Clinic Appointment Request; Future  -     Infusion Appointment Request; Future  -     Oncology Line Draw Appointment Request; Future  -     CBC and Auto Differential; Future  -     Comprehensive metabolic panel; Future  -     Infusion Appointment Request; Future  -     Clinic Appointment Request; Future  -     Infusion Appointment Request; Future  -     Oncology Line Draw Appointment Request; Future  -     CBC and Auto Differential; Future  -     Comprehensive metabolic panel; Future  -     Infusion Appointment Request; Future  Other orders  -     potassium chloride 40 mEq in sterile water for injection 100 mL  -     atropine syringe 0.4 mg  -     atropine syringe 0.4 mg  -     ondansetron (Zofran) tablet 16 mg  -     dexAMETHasone (Decadron) tablet 12 mg  -     atropine syringe 0.4 mg  -     diphenhydrAMINE (BENADryl) capsule 50 mg  -     atropine injection 0.4 mg  -     prochlorperazine " (Compazine) tablet 10 mg  -     prochlorperazine (Compazine) injection 10 mg  -     OXALIplatin (Eloxatin) 180 mg in dextrose 5% 536 mL IV  -     leucovorin 860 mg in dextrose 5% 143 mL IV  -     irinotecan (Camptosar) 240 mg in dextrose 5% 512 mL IV  -     fluorouracil (Adrucil) 5,100 mg in sodium chloride 0.9% 138 mL IV via Home Infusion  -     LORazepam (Ativan) injection 1 mg  -     sodium chloride 0.9 % bolus 500 mL  -     dextrose 5 % in water (D5W) bolus 500 mL  -     diphenhydrAMINE (BENADryl) injection 50 mg  -     methylPREDNISolone sod succinate (SOLU-Medrol) 40 mg/mL injection 40 mg  -     famotidine PF (Pepcid) injection 20 mg  -     EPINEPHrine (Epipen) injection syringe 0.3 mg  -     albuterol 2.5 mg /3 mL (0.083 %) nebulizer solution 3 mL  -     dexAMETHasone (Decadron) injection 8 mg  -     palonosetron (Aloxi) injection 250 mcg  -     sodium chloride 0.9 % bolus 500 mL  -     dextrose 5 % in water (D5W) bolus 500 mL  -     diphenhydrAMINE (BENADryl) injection 50 mg  -     methylPREDNISolone sod succinate (SOLU-Medrol) 40 mg/mL injection 40 mg  -     famotidine PF (Pepcid) injection 20 mg  -     EPINEPHrine (Epipen) injection syringe 0.3 mg  -     albuterol 2.5 mg /3 mL (0.083 %) nebulizer solution 3 mL  -     atropine syringe 0.4 mg  -     ondansetron (Zofran) tablet 16 mg  -     dexAMETHasone (Decadron) tablet 12 mg  -     atropine syringe 0.4 mg  -     diphenhydrAMINE (BENADryl) capsule 50 mg  -     atropine injection 0.4 mg  -     prochlorperazine (Compazine) tablet 10 mg  -     prochlorperazine (Compazine) injection 10 mg  -     OXALIplatin (Eloxatin) 180 mg in dextrose 5% 536 mL IV  -     leucovorin 860 mg in dextrose 5% 143 mL IV  -     irinotecan (Camptosar) 240 mg in dextrose 5% 512 mL IV  -     fluorouracil (Adrucil) 5,100 mg in sodium chloride 0.9% 138 mL IV via Home Infusion  -     LORazepam (Ativan) injection 1 mg  -     sodium chloride 0.9 % bolus 500 mL  -     dextrose 5 % in water  (D5W) bolus 500 mL  -     diphenhydrAMINE (BENADryl) injection 50 mg  -     methylPREDNISolone sod succinate (SOLU-Medrol) 40 mg/mL injection 40 mg  -     famotidine PF (Pepcid) injection 20 mg  -     EPINEPHrine (Epipen) injection syringe 0.3 mg  -     albuterol 2.5 mg /3 mL (0.083 %) nebulizer solution 3 mL  -     dexAMETHasone (Decadron) injection 8 mg  -     palonosetron (Aloxi) injection 250 mcg  -     sodium chloride 0.9 % bolus 500 mL  -     dextrose 5 % in water (D5W) bolus 500 mL  -     diphenhydrAMINE (BENADryl) injection 50 mg  -     methylPREDNISolone sod succinate (SOLU-Medrol) 40 mg/mL injection 40 mg  -     famotidine PF (Pepcid) injection 20 mg  -     EPINEPHrine (Epipen) injection syringe 0.3 mg  -     albuterol 2.5 mg /3 mL (0.083 %) nebulizer solution 3 mL  -     atropine syringe 0.4 mg  -     ondansetron (Zofran) tablet 16 mg  -     dexAMETHasone (Decadron) tablet 12 mg  -     atropine syringe 0.4 mg  -     diphenhydrAMINE (BENADryl) capsule 50 mg  -     atropine injection 0.4 mg  -     prochlorperazine (Compazine) tablet 10 mg  -     prochlorperazine (Compazine) injection 10 mg  -     OXALIplatin (Eloxatin) 180 mg in dextrose 5% 536 mL IV  -     leucovorin 860 mg in dextrose 5% 143 mL IV  -     irinotecan (Camptosar) 240 mg in dextrose 5% 512 mL IV  -     fluorouracil (Adrucil) 5,100 mg in sodium chloride 0.9% 138 mL IV via Home Infusion  -     LORazepam (Ativan) injection 1 mg  -     sodium chloride 0.9 % bolus 500 mL  -     dextrose 5 % in water (D5W) bolus 500 mL  -     diphenhydrAMINE (BENADryl) injection 50 mg  -     methylPREDNISolone sod succinate (SOLU-Medrol) 40 mg/mL injection 40 mg  -     famotidine PF (Pepcid) injection 20 mg  -     EPINEPHrine (Epipen) injection syringe 0.3 mg  -     albuterol 2.5 mg /3 mL (0.083 %) nebulizer solution 3 mL  -     dexAMETHasone (Decadron) injection 8 mg  -     palonosetron (Aloxi) injection 250 mcg  -     sodium chloride 0.9 % bolus 500 mL  -      dextrose 5 % in water (D5W) bolus 500 mL  -     diphenhydrAMINE (BENADryl) injection 50 mg  -     methylPREDNISolone sod succinate (SOLU-Medrol) 40 mg/mL injection 40 mg  -     famotidine PF (Pepcid) injection 20 mg  -     EPINEPHrine (Epipen) injection syringe 0.3 mg  -     albuterol 2.5 mg /3 mL (0.083 %) nebulizer solution 3 mL      Ashley Mesa MD  Hematology/Oncology  [facility]      Unaib Attestation  By signing my name below, IAyla Scribe, attest that this documentation has been prepared under the direction and in the presence of Ashley Mesa MD.  Time Spent  Prep time on day of patient encounter: 5 minutes  Time spent directly with patient, family or caregiver: 27 minutes  Additional Time Spent on Patient Care Activities: 6 minutes  Documentation Time: 5 minutes  Other Time Spent: 0 minutes  Total: 43 minutes

## 2025-03-18 NOTE — SIGNIFICANT EVENT
03/18/25 0944   Prechemo Checklist   Has the patient been in the hospital, ED, or urgent care since last date of service No   Chemo/Immuno Consent Completed and Signed Yes  (2/10/25)   Protocol/Indications Verified Yes   Confirmed to previous date/time of medication Yes  (C3D1 today)   Compared to previous dose Yes   All medications are dated accurately Yes   Pregnancy Test Negative Not applicable   Parameters Met   (3/17/25 - ANC: 2.34, PLTS: 201, bili: 0.5, CrCl: greater than 30mL/min)   BSA/Weight-Height Verified Yes   Dose Calculations Verified (current, total, cumulative) Yes

## 2025-03-18 NOTE — PROGRESS NOTES
Social work continues to follow this patient for ongoing assessment and support. I met with the patient as he was receiving treatment today to introduce social work services and offer support. He was very pleasant and easily engaged in conversation. He was present with his wife, Nathaly. Of note, the patient had an initial distress score of 7 due to physical concerns and worry/anxiety. The patient denies concerns or needs today and reports that he is currently doing well. Today is his third treatment and he shared that he is adjusting to the routine of treatment. We discussed common initial anxiety related to start of treatment, which patient acknowledged. He reports having adult children in their 20's, no grandchildren. He reports he continues to work as a  and has FMLA in place when time off is needed; he states his employer has helped him with appropriate accommodations such as lifting restrictions and limiting interactions with people. He denies concerns with bills or insurance at this time; he discussed waiting for EOB from insurance to compare charges, etc. I encouraged the patient to let me know if questions or concerns arise with bills and we can look into assistance. He is agreeable. I explained how social work can be helpful and provided my contact information. I also provided information and current program guide for [facility] for additional support in the community as needed. He and his wife expressed appreciation for the visit and deny concerns at this time. Social work will remain available to assist this patient.    CARLINE Lambert, LILLIAN

## 2025-03-20 ENCOUNTER — INFUSION (OUTPATIENT)
Dept: HEMATOLOGY/ONCOLOGY | Facility: CLINIC | Age: 60
End: 2025-03-20
Payer: COMMERCIAL

## 2025-03-20 VITALS
OXYGEN SATURATION: 98 % | RESPIRATION RATE: 18 BRPM | DIASTOLIC BLOOD PRESSURE: 58 MMHG | SYSTOLIC BLOOD PRESSURE: 96 MMHG | TEMPERATURE: 97 F | WEIGHT: 186.95 LBS | BODY MASS INDEX: 27.56 KG/M2 | HEART RATE: 70 BPM

## 2025-03-20 DIAGNOSIS — C25.9 ADENOCARCINOMA OF PANCREAS (MULTI): ICD-10-CM

## 2025-03-20 PROCEDURE — 96375 TX/PRO/DX INJ NEW DRUG ADDON: CPT | Mod: INF

## 2025-03-20 PROCEDURE — 2500000004 HC RX 250 GENERAL PHARMACY W/ HCPCS (ALT 636 FOR OP/ED): Performed by: INTERNAL MEDICINE

## 2025-03-20 PROCEDURE — 96374 THER/PROPH/DIAG INJ IV PUSH: CPT | Mod: INF

## 2025-03-20 RX ORDER — HEPARIN SODIUM,PORCINE/PF 10 UNIT/ML
50 SYRINGE (ML) INTRAVENOUS AS NEEDED
OUTPATIENT
Start: 2025-03-20

## 2025-03-20 RX ORDER — PALONOSETRON 0.05 MG/ML
250 INJECTION, SOLUTION INTRAVENOUS ONCE
Status: COMPLETED | OUTPATIENT
Start: 2025-03-20 | End: 2025-03-20

## 2025-03-20 RX ORDER — HEPARIN 100 UNIT/ML
500 SYRINGE INTRAVENOUS AS NEEDED
OUTPATIENT
Start: 2025-03-20

## 2025-03-20 RX ADMIN — DEXAMETHASONE SODIUM PHOSPHATE 8 MG: 4 INJECTION, SOLUTION INTRA-ARTICULAR; INTRALESIONAL; INTRAMUSCULAR; INTRAVENOUS; SOFT TISSUE at 14:07

## 2025-03-20 RX ADMIN — PALONOSETRON 250 MCG: 0.05 INJECTION, SOLUTION INTRAVENOUS at 14:05

## 2025-03-20 ASSESSMENT — PAIN SCALES - GENERAL: PAINLEVEL_OUTOF10: 0-NO PAIN

## 2025-03-24 ENCOUNTER — TELEPHONE (OUTPATIENT)
Dept: CARDIOLOGY | Facility: CLINIC | Age: 60
End: 2025-03-24
Payer: COMMERCIAL

## 2025-03-24 NOTE — TELEPHONE ENCOUNTER
Patient called office with concerns over his B/P medication.  He is currently receiving Chemo for pancreatic cancer, recently received his 3rd treatment.  He has gone from 225# to 180-185#.  His B/P has been running lower. At home 117-70'S.  His B/P  at his last treatment was  97/76, although he remains asymptomatic.  He wanted ELIAN Casas to see if any medication changes were necessary.  Routed to Sylvia CASTILLO

## 2025-03-25 NOTE — TELEPHONE ENCOUNTER
Called and notified patient that per Dr. Eliezer Puente, DO patient does not need to change any medications unless symptomatic. He verbalized understanding.

## 2025-03-29 NOTE — PROGRESS NOTES
Patient ID: Hardy Iqbal is a 59 y.o. male.    Oncology History   Adenocarcinoma of pancreas (Multi)   2/3/2025 Initial Diagnosis    Adenocarcinoma of pancreas (Multi)     2/18/2025 -  Chemotherapy    mFOLFIRINOX (Fluorouracil Continuous Infusion / Leucovorin / Irinotecan / Oxaliplatin), 14 Day Cycles     7/22/2025 - 7/22/2025 Chemotherapy    mFOLFIRINOX (Fluorouracil Continuous Infusion / Leucovorin / Irinotecan / Oxaliplatin), 14 Day Cycles           Subjective    HPI  Mr. Sathish Iqbal is a 59 y.o. male presenting for follow up of pancreatic cancer, here for consideration of cycle 4. He notes his last treatment was more manageable with the dose reduction. He notes he has had some loose stools. He denies new swelling, palpitations, cough, chest pain, shortness of breath.    Patient's past medical history, surgical history, family history and social history reviewed.    Review of Systems:   Review of Systems:    Positive per HPI, otherwise negative.       Objective    BSA: 2.02 meters squared  /70 (BP Location: Right arm, Patient Position: Sitting)   Pulse 67   Temp 36.5 °C (97.7 °F) (Temporal)   Resp 17   Wt 83.9 kg (185 lb)   SpO2 99%   BMI 27.28 kg/m²        Physical Exam  Gen: appears well in clinic, NAD  HEENT: atraumatic head, normocephalic, EOMI, conjunctiva normal  LUNG: no increased WOB, CTAB  CV: No JVD. RRR  GI: soft, NT, ND  LE: no LE edema  Skin: no obvious rashes or lesions on visible skin  Neuro: interactive, no focal deficits noted  Psych: normal mood and affect    Performance Status:  Symptomatic; fully ambulatory    Labs/Imaging/Pathology: Personally reviewed reports and images in Epic electronic medical record system. Pertinent results as it related to the plan represented in below in assessment and plan.       Assessment/Plan   Invasive adenocarcinoma of pancreas, MSI-S  - Initial CA 19-9 less than 4   - Patient was initially presented to the emergency room on 1/2/25 for  worsening abdominal pain.   - CT abdomen pelvis at that time revealed a defined mass involving the uncinate process of the pancreas  - biopsy on 1/29/25, which showed invasive adenocarcinoma poorly differentiated. MSI status intact  - CT of the chest on 1/24/25 showed no clear evidence of metastases  - met with Dr. Castro on 1/23/25 and presents to discuss neoadjuvant  chemotherapy today  - patient has a good PS and discussed FOLFIRINOS with 5-FU, oxaliplatin, and irinotecan  - Reviewed side effects, consent signed.   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 He can cancel if he prefers.   - Labs unremarkable.    - Prescription for Zofran refilled at a higher quantity of 60 mg.    - RTC with me in two weeks    03/18/2025:  -Proceed with treatment today.   -Patient overall tolerated reduced treatment dose during last infusion.   -Potassium is low we will supplement with 40mEq today, and we will start him on 20 mEq daily with the prescription sent to pharmacy.   -GI toxicity manageable at this time, patient complains of no new pain or abdominal issues.   - will continue intensive monitoring for chemo side effects    -RTC in 2 weeks for next cycle.     3/31/25:   - Patient did tolerate treatment better with dose reduction   - We discussed CA 19-9 continues to be low, but may not be reliable given it was not ever elevated.    - Patient is concerned about not monitoring disease until after cycle 8 and prefers to have a CT before next treatment   - Will plan for CT CAP before next cycle   - He  continues to feel well, no longer has abdominal pain which is assuring for treatment response.    - ALT 70   - No dose reduction for next treatment, we will monitor   - CT scan as above   - RTC in 2 weeks for next cycle       Reviewed ongoing medical problems and how they relate to his malignancy, will continue long term monitoring.    RTC in 2 weeks for next cycle  This note has been transcribed using a medical scribe and there is a possibility of unintentional typing misprints    Diagnoses and all orders for this visit:  Adenocarcinoma of pancreas (Multi)  -     Clinic Appointment Request  -     CT abdomen pelvis w and wo IV contrast; Future  -     Clinic Appointment Request; Future  -     Infusion Appointment Request; Future  -     CBC and Auto Differential; Future  -     Comprehensive metabolic panel; Future  -     Infusion Appointment Request; Future  Other orders  -     atropine syringe 0.4 mg  -     ondansetron (Zofran) tablet 16 mg  -     dexAMETHasone (Decadron) tablet 12 mg  -     atropine syringe 0.4 mg  -     diphenhydrAMINE (BENADryl) capsule 50 mg  -     atropine injection 0.4 mg  -     prochlorperazine (Compazine) tablet 10 mg  -     prochlorperazine (Compazine) injection 10 mg  -     OXALIplatin (Eloxatin) 180 mg in dextrose 5% 536 mL IV  -     leucovorin 860 mg in dextrose 5% 143 mL IV  -     irinotecan (Camptosar) 240 mg in dextrose 5% 512 mL IV  -     fluorouracil (Adrucil) 5,100 mg in sodium chloride 0.9% 138 mL IV via Home Infusion  -     LORazepam (Ativan) injection 1 mg  -     sodium chloride 0.9 % bolus 500 mL  -     dextrose 5 % in water (D5W) bolus 500 mL  -     diphenhydrAMINE (BENADryl) injection 50 mg  -     methylPREDNISolone sod succinate (SOLU-Medrol) 40 mg/mL injection 40 mg  -     famotidine PF (Pepcid) injection 20 mg  -     EPINEPHrine (Epipen) injection syringe 0.3 mg  -     albuterol 2.5 mg /3 mL (0.083 %) nebulizer solution 3 mL  -     dexAMETHasone (Decadron) injection 8 mg  -      palonosetron (Aloxi) injection 250 mcg  -     sodium chloride 0.9 % bolus 500 mL  -     dextrose 5 % in water (D5W) bolus 500 mL  -     diphenhydrAMINE (BENADryl) injection 50 mg  -     methylPREDNISolone sod succinate (SOLU-Medrol) 40 mg/mL injection 40 mg  -     famotidine PF (Pepcid) injection 20 mg  -     EPINEPHrine (Epipen) injection syringe 0.3 mg  -     albuterol 2.5 mg /3 mL (0.083 %) nebulizer solution 3 mL      Ashley Mesa MD  Hematology/Oncology  [facility]      Otoniel Attestation  By signing my name below, I, Otoniel Fallon, attest that this documentation has been prepared under the direction and in the presence of Ashley Mesa MD.      Time Spent  Prep time on day of patient encounter:   Time spent directly with patient, family or caregiver:   Additional Time Spent on Patient Care Activities:   Documentation Time:   Other Time Spent:   Total:

## 2025-03-30 ENCOUNTER — TELEPHONE (OUTPATIENT)
Dept: GENETICS | Facility: CLINIC | Age: 60
End: 2025-03-30

## 2025-03-31 ENCOUNTER — LAB (OUTPATIENT)
Dept: LAB | Facility: CLINIC | Age: 60
End: 2025-03-31
Payer: COMMERCIAL

## 2025-03-31 DIAGNOSIS — C25.9 ADENOCARCINOMA OF PANCREAS (MULTI): ICD-10-CM

## 2025-03-31 LAB
ALBUMIN SERPL BCP-MCNC: 3.7 G/DL (ref 3.4–5)
ALP SERPL-CCNC: 84 U/L (ref 33–136)
ALT SERPL W P-5'-P-CCNC: 70 U/L (ref 10–52)
ANION GAP SERPL CALC-SCNC: 13 MMOL/L (ref 10–20)
AST SERPL W P-5'-P-CCNC: 27 U/L (ref 9–39)
BASOPHILS # BLD AUTO: 0.01 X10*3/UL (ref 0–0.1)
BASOPHILS NFR BLD AUTO: 0.2 %
BILIRUB SERPL-MCNC: 0.5 MG/DL (ref 0–1.2)
BUN SERPL-MCNC: 9 MG/DL (ref 6–23)
CALCIUM SERPL-MCNC: 8.6 MG/DL (ref 8.6–10.3)
CHLORIDE SERPL-SCNC: 110 MMOL/L (ref 98–107)
CO2 SERPL-SCNC: 23 MMOL/L (ref 21–32)
COMMENTS - MP RESULT TYPE: NORMAL
CREAT SERPL-MCNC: 0.66 MG/DL (ref 0.5–1.3)
EGFRCR SERPLBLD CKD-EPI 2021: >90 ML/MIN/1.73M*2
EOSINOPHIL # BLD AUTO: 0.04 X10*3/UL (ref 0–0.7)
EOSINOPHIL NFR BLD AUTO: 0.8 %
ERYTHROCYTE [DISTWIDTH] IN BLOOD BY AUTOMATED COUNT: 15.7 % (ref 11.5–14.5)
GLUCOSE SERPL-MCNC: 123 MG/DL (ref 74–99)
HCT VFR BLD AUTO: 34.5 % (ref 41–52)
HGB BLD-MCNC: 11.5 G/DL (ref 13.5–17.5)
IMM GRANULOCYTES # BLD AUTO: 0.01 X10*3/UL (ref 0–0.7)
IMM GRANULOCYTES NFR BLD AUTO: 0.2 % (ref 0–0.9)
LYMPHOCYTES # BLD AUTO: 1.98 X10*3/UL (ref 1.2–4.8)
LYMPHOCYTES NFR BLD AUTO: 41.3 %
MCH RBC QN AUTO: 30 PG (ref 26–34)
MCHC RBC AUTO-ENTMCNC: 33.3 G/DL (ref 32–36)
MCV RBC AUTO: 90 FL (ref 80–100)
MONOCYTES # BLD AUTO: 0.48 X10*3/UL (ref 0.1–1)
MONOCYTES NFR BLD AUTO: 10 %
NEUTROPHILS # BLD AUTO: 2.28 X10*3/UL (ref 1.2–7.7)
NEUTROPHILS NFR BLD AUTO: 47.5 %
PLATELET # BLD AUTO: 189 X10*3/UL (ref 150–450)
POTASSIUM SERPL-SCNC: 3.5 MMOL/L (ref 3.5–5.3)
PROT SERPL-MCNC: 5.7 G/DL (ref 6.4–8.2)
RBC # BLD AUTO: 3.83 X10*6/UL (ref 4.5–5.9)
SCAN RESULT: NORMAL
SODIUM SERPL-SCNC: 142 MMOL/L (ref 136–145)
WBC # BLD AUTO: 4.8 X10*3/UL (ref 4.4–11.3)

## 2025-03-31 PROCEDURE — 85025 COMPLETE CBC W/AUTO DIFF WBC: CPT

## 2025-03-31 PROCEDURE — 80053 COMPREHEN METABOLIC PANEL: CPT

## 2025-04-01 ENCOUNTER — INFUSION (OUTPATIENT)
Dept: HEMATOLOGY/ONCOLOGY | Facility: CLINIC | Age: 60
End: 2025-04-01
Payer: COMMERCIAL

## 2025-04-01 ENCOUNTER — OFFICE VISIT (OUTPATIENT)
Dept: HEMATOLOGY/ONCOLOGY | Facility: CLINIC | Age: 60
End: 2025-04-01
Payer: COMMERCIAL

## 2025-04-01 VITALS
HEART RATE: 67 BPM | RESPIRATION RATE: 17 BRPM | TEMPERATURE: 97.7 F | SYSTOLIC BLOOD PRESSURE: 114 MMHG | DIASTOLIC BLOOD PRESSURE: 70 MMHG | OXYGEN SATURATION: 99 % | BODY MASS INDEX: 27.28 KG/M2 | WEIGHT: 185 LBS

## 2025-04-01 DIAGNOSIS — C25.9 ADENOCARCINOMA OF PANCREAS (MULTI): Primary | ICD-10-CM

## 2025-04-01 PROCEDURE — 3074F SYST BP LT 130 MM HG: CPT | Performed by: INTERNAL MEDICINE

## 2025-04-01 PROCEDURE — 2500000001 HC RX 250 WO HCPCS SELF ADMINISTERED DRUGS (ALT 637 FOR MEDICARE OP): Performed by: INTERNAL MEDICINE

## 2025-04-01 PROCEDURE — 3078F DIAST BP <80 MM HG: CPT | Performed by: INTERNAL MEDICINE

## 2025-04-01 PROCEDURE — 96376 TX/PRO/DX INJ SAME DRUG ADON: CPT

## 2025-04-01 PROCEDURE — 96413 CHEMO IV INFUSION 1 HR: CPT

## 2025-04-01 PROCEDURE — 99215 OFFICE O/P EST HI 40 MIN: CPT | Mod: 25 | Performed by: INTERNAL MEDICINE

## 2025-04-01 PROCEDURE — G2211 COMPLEX E/M VISIT ADD ON: HCPCS | Performed by: INTERNAL MEDICINE

## 2025-04-01 PROCEDURE — 99215 OFFICE O/P EST HI 40 MIN: CPT | Performed by: INTERNAL MEDICINE

## 2025-04-01 PROCEDURE — 96375 TX/PRO/DX INJ NEW DRUG ADDON: CPT | Mod: INF

## 2025-04-01 PROCEDURE — 96417 CHEMO IV INFUS EACH ADDL SEQ: CPT

## 2025-04-01 PROCEDURE — 96416 CHEMO PROLONG INFUSE W/PUMP: CPT

## 2025-04-01 PROCEDURE — 2500000004 HC RX 250 GENERAL PHARMACY W/ HCPCS (ALT 636 FOR OP/ED): Performed by: INTERNAL MEDICINE

## 2025-04-01 PROCEDURE — 96415 CHEMO IV INFUSION ADDL HR: CPT

## 2025-04-01 RX ORDER — FAMOTIDINE 10 MG/ML
20 INJECTION, SOLUTION INTRAVENOUS ONCE AS NEEDED
OUTPATIENT
Start: 2025-05-15

## 2025-04-01 RX ORDER — ONDANSETRON HYDROCHLORIDE 8 MG/1
16 TABLET, FILM COATED ORAL ONCE
OUTPATIENT
Start: 2025-05-13 | End: 2025-05-13

## 2025-04-01 RX ORDER — PROCHLORPERAZINE EDISYLATE 5 MG/ML
10 INJECTION INTRAMUSCULAR; INTRAVENOUS EVERY 6 HOURS PRN
OUTPATIENT
Start: 2025-05-13

## 2025-04-01 RX ORDER — ATROPINE SULFATE 0.1 MG/ML
0.4 INJECTION INTRAVENOUS ONCE
OUTPATIENT
Start: 2025-05-13 | End: 2025-05-13

## 2025-04-01 RX ORDER — ALBUTEROL SULFATE 0.83 MG/ML
3 SOLUTION RESPIRATORY (INHALATION) AS NEEDED
OUTPATIENT
Start: 2025-05-15

## 2025-04-01 RX ORDER — DIPHENHYDRAMINE HYDROCHLORIDE 50 MG/ML
50 INJECTION, SOLUTION INTRAMUSCULAR; INTRAVENOUS AS NEEDED
OUTPATIENT
Start: 2025-05-15

## 2025-04-01 RX ORDER — ONDANSETRON HYDROCHLORIDE 8 MG/1
16 TABLET, FILM COATED ORAL ONCE
Status: COMPLETED | OUTPATIENT
Start: 2025-04-01 | End: 2025-04-01

## 2025-04-01 RX ORDER — DIPHENHYDRAMINE HCL 25 MG
50 CAPSULE ORAL ONCE
OUTPATIENT
Start: 2025-05-13 | End: 2025-05-13

## 2025-04-01 RX ORDER — ATROPINE SULFATE 0.1 MG/ML
0.4 INJECTION INTRAVENOUS ONCE
Status: DISCONTINUED | OUTPATIENT
Start: 2025-04-01 | End: 2025-04-01

## 2025-04-01 RX ORDER — ATROPINE SULFATE 0.4 MG/ML
0.4 INJECTION, SOLUTION ENDOTRACHEAL; INTRAMEDULLARY; INTRAMUSCULAR; INTRAVENOUS; SUBCUTANEOUS ONCE
Status: COMPLETED | OUTPATIENT
Start: 2025-04-01 | End: 2025-04-01

## 2025-04-01 RX ORDER — ATROPINE SULFATE 0.4 MG/ML
0.4 INJECTION, SOLUTION ENDOTRACHEAL; INTRAMEDULLARY; INTRAMUSCULAR; INTRAVENOUS; SUBCUTANEOUS
Status: DISCONTINUED | OUTPATIENT
Start: 2025-04-01 | End: 2025-04-01 | Stop reason: HOSPADM

## 2025-04-01 RX ORDER — DEXAMETHASONE 6 MG/1
12 TABLET ORAL ONCE
Status: COMPLETED | OUTPATIENT
Start: 2025-04-01 | End: 2025-04-01

## 2025-04-01 RX ORDER — HEPARIN SODIUM,PORCINE/PF 10 UNIT/ML
50 SYRINGE (ML) INTRAVENOUS AS NEEDED
Status: CANCELLED | OUTPATIENT
Start: 2025-04-01

## 2025-04-01 RX ORDER — ALBUTEROL SULFATE 0.83 MG/ML
3 SOLUTION RESPIRATORY (INHALATION) AS NEEDED
OUTPATIENT
Start: 2025-05-13

## 2025-04-01 RX ORDER — DIPHENHYDRAMINE HCL 25 MG
50 CAPSULE ORAL ONCE
Status: COMPLETED | OUTPATIENT
Start: 2025-04-01 | End: 2025-04-01

## 2025-04-01 RX ORDER — PROCHLORPERAZINE MALEATE 10 MG
10 TABLET ORAL EVERY 6 HOURS PRN
OUTPATIENT
Start: 2025-05-13

## 2025-04-01 RX ORDER — FAMOTIDINE 10 MG/ML
20 INJECTION, SOLUTION INTRAVENOUS ONCE AS NEEDED
OUTPATIENT
Start: 2025-05-13

## 2025-04-01 RX ORDER — EPINEPHRINE 0.3 MG/.3ML
0.3 INJECTION SUBCUTANEOUS EVERY 5 MIN PRN
OUTPATIENT
Start: 2025-05-13

## 2025-04-01 RX ORDER — LORAZEPAM 2 MG/ML
1 INJECTION INTRAMUSCULAR AS NEEDED
OUTPATIENT
Start: 2025-05-13

## 2025-04-01 RX ORDER — HEPARIN 100 UNIT/ML
500 SYRINGE INTRAVENOUS AS NEEDED
Status: CANCELLED | OUTPATIENT
Start: 2025-04-01

## 2025-04-01 RX ORDER — DIPHENHYDRAMINE HYDROCHLORIDE 50 MG/ML
50 INJECTION, SOLUTION INTRAMUSCULAR; INTRAVENOUS AS NEEDED
OUTPATIENT
Start: 2025-05-13

## 2025-04-01 RX ORDER — EPINEPHRINE 0.3 MG/.3ML
0.3 INJECTION SUBCUTANEOUS EVERY 5 MIN PRN
OUTPATIENT
Start: 2025-05-15

## 2025-04-01 RX ORDER — DEXAMETHASONE 6 MG/1
12 TABLET ORAL ONCE
OUTPATIENT
Start: 2025-05-13 | End: 2025-05-13

## 2025-04-01 RX ORDER — ATROPINE SULFATE 0.4 MG/ML
0.4 INJECTION, SOLUTION ENDOTRACHEAL; INTRAMEDULLARY; INTRAMUSCULAR; INTRAVENOUS; SUBCUTANEOUS
OUTPATIENT
Start: 2025-05-13

## 2025-04-01 RX ORDER — PALONOSETRON 0.05 MG/ML
250 INJECTION, SOLUTION INTRAVENOUS ONCE
OUTPATIENT
Start: 2025-05-15 | End: 2025-05-15

## 2025-04-01 RX ADMIN — DIPHENHYDRAMINE HYDROCHLORIDE 50 MG: 25 CAPSULE ORAL at 10:44

## 2025-04-01 RX ADMIN — ONDANSETRON HYDROCHLORIDE 16 MG: 8 TABLET, FILM COATED ORAL at 10:44

## 2025-04-01 RX ADMIN — ATROPINE SULFATE 0.4 MG: 0.4 INJECTION, SOLUTION INTRAVENOUS at 15:19

## 2025-04-01 RX ADMIN — OXALIPLATIN 180 MG: 5 INJECTION, SOLUTION INTRAVENOUS at 11:21

## 2025-04-01 RX ADMIN — FLUOROURACIL 5100 MG: 50 INJECTION, SOLUTION INTRAVENOUS at 15:27

## 2025-04-01 RX ADMIN — ATROPINE SULFATE 0.4 MG: 0.4 INJECTION, SOLUTION INTRAVENOUS at 13:39

## 2025-04-01 RX ADMIN — DEXTROSE MONOHYDRATE 240 MG: 50 INJECTION, SOLUTION INTRAVENOUS at 13:43

## 2025-04-01 RX ADMIN — DEXAMETHASONE 12 MG: 6 TABLET ORAL at 10:44

## 2025-04-01 ASSESSMENT — PAIN SCALES - GENERAL: PAINLEVEL_OUTOF10: 0-NO PAIN

## 2025-04-01 NOTE — SIGNIFICANT EVENT
04/01/25 1009   Prechemo Checklist   Has the patient been in the hospital, ED, or urgent care since last date of service No   Chemo/Immuno Consent Completed and Signed Yes  (2/10/25)   Protocol/Indications Verified Yes   Confirmed to previous date/time of medication Yes  (C4D1 today)   Compared to previous dose Yes   All medications are dated accurately Yes   Pregnancy Test Negative Not applicable   Parameters Met Yes  (3/31/25 - ANC: 2.28, PLTS: 189, bili: 0.5, CrCl: greater than 30mL/min)   BSA/Weight-Height Verified Yes   Dose Calculations Verified (current, total, cumulative) Yes

## 2025-04-03 ENCOUNTER — INFUSION (OUTPATIENT)
Dept: HEMATOLOGY/ONCOLOGY | Facility: CLINIC | Age: 60
End: 2025-04-03
Payer: COMMERCIAL

## 2025-04-03 VITALS
WEIGHT: 187.39 LBS | DIASTOLIC BLOOD PRESSURE: 66 MMHG | BODY MASS INDEX: 27.63 KG/M2 | TEMPERATURE: 98.2 F | OXYGEN SATURATION: 98 % | RESPIRATION RATE: 16 BRPM | SYSTOLIC BLOOD PRESSURE: 100 MMHG | HEART RATE: 71 BPM

## 2025-04-03 DIAGNOSIS — C25.9 ADENOCARCINOMA OF PANCREAS (MULTI): ICD-10-CM

## 2025-04-03 PROCEDURE — 2500000004 HC RX 250 GENERAL PHARMACY W/ HCPCS (ALT 636 FOR OP/ED): Performed by: INTERNAL MEDICINE

## 2025-04-03 PROCEDURE — 96375 TX/PRO/DX INJ NEW DRUG ADDON: CPT | Mod: INF

## 2025-04-03 PROCEDURE — 96374 THER/PROPH/DIAG INJ IV PUSH: CPT | Mod: INF

## 2025-04-03 RX ORDER — HEPARIN 100 UNIT/ML
500 SYRINGE INTRAVENOUS AS NEEDED
OUTPATIENT
Start: 2025-04-03

## 2025-04-03 RX ORDER — HEPARIN SODIUM,PORCINE/PF 10 UNIT/ML
50 SYRINGE (ML) INTRAVENOUS AS NEEDED
OUTPATIENT
Start: 2025-04-03

## 2025-04-03 RX ORDER — PALONOSETRON 0.05 MG/ML
250 INJECTION, SOLUTION INTRAVENOUS ONCE
Status: COMPLETED | OUTPATIENT
Start: 2025-04-03 | End: 2025-04-03

## 2025-04-03 RX ADMIN — DEXAMETHASONE SODIUM PHOSPHATE 8 MG: 4 INJECTION, SOLUTION INTRA-ARTICULAR; INTRALESIONAL; INTRAMUSCULAR; INTRAVENOUS; SOFT TISSUE at 13:46

## 2025-04-03 RX ADMIN — PALONOSETRON HYDROCHLORIDE 250 MCG: 0.25 INJECTION INTRAVENOUS at 13:44

## 2025-04-03 ASSESSMENT — PAIN SCALES - GENERAL: PAINLEVEL_OUTOF10: 0-NO PAIN

## 2025-04-11 ENCOUNTER — HOSPITAL ENCOUNTER (OUTPATIENT)
Dept: RADIOLOGY | Facility: HOSPITAL | Age: 60
Discharge: HOME | End: 2025-04-11
Payer: COMMERCIAL

## 2025-04-11 DIAGNOSIS — C25.9 ADENOCARCINOMA OF PANCREAS (MULTI): ICD-10-CM

## 2025-04-11 PROCEDURE — 74177 CT ABD & PELVIS W/CONTRAST: CPT

## 2025-04-11 PROCEDURE — 2550000001 HC RX 255 CONTRASTS: Performed by: INTERNAL MEDICINE

## 2025-04-11 RX ADMIN — IOHEXOL 75 ML: 350 INJECTION, SOLUTION INTRAVENOUS at 08:51

## 2025-04-12 NOTE — PROGRESS NOTES
Patient ID: Hardy Iqbal is a 59 y.o. male.    Oncology History   Adenocarcinoma of pancreas (Multi)   2/3/2025 Initial Diagnosis    Adenocarcinoma of pancreas (Multi)     2/18/2025 - 4/3/2025 Chemotherapy    mFOLFIRINOX (Fluorouracil Continuous Infusion / Leucovorin / Irinotecan / Oxaliplatin), 14 Day Cycles     4/15/2025 -  Chemotherapy    Albumin-bound PACLitaxel / Gemcitabine (Day 1, 8, and 15), 28 Day Cycles     7/22/2025 - 7/22/2025 Chemotherapy    mFOLFIRINOX (Fluorouracil Continuous Infusion / Leucovorin / Irinotecan / Oxaliplatin), 14 Day Cycles           Subjective    HPI  Mr. Sathish Iqbal is a 59 y.o. male presenting for follow up of pancreatic cancer.  Since last visit patient had a CT scan on 4/11/2025 which shows that the pancreatic mass is enlarging along with new liver metastases.  Patient reports he has felt well with no worsening bloating, abdominal pain or discomfort.  Reports that his energy has improved and states that he has felt significantly better than starting treatment.  No new LFT changes or lab abnormalities.  No other major complaints.          Patient's past medical history, surgical history, family history and social history reviewed.    Review of Systems:   Review of Systems:    Positive per HPI, otherwise negative.       Objective    BSA: 2.02 meters squared  /68 (BP Location: Right arm, Patient Position: Sitting)   Pulse 75   Temp 36.5 °C (97.7 °F) (Temporal)   Resp 16   Wt 84 kg (185 lb 3 oz)   SpO2 98%   BMI 27.30 kg/m²        Physical Exam  Gen: appears well in clinic, NAD  HEENT: atraumatic head, normocephalic, EOMI, conjunctiva normal  LUNG: no increased WOB, CTAB  CV: No JVD. RRR  GI: soft, NT, ND  LE: no LE edema  Skin: no obvious rashes or lesions on visible skin  Neuro: interactive, no focal deficits noted  Psych: normal mood and affect    Performance Status:  Symptomatic; fully ambulatory    Labs/Imaging/Pathology: Personally reviewed reports and images  in Epic electronic medical record system. Pertinent results as it related to the plan represented in below in assessment and plan.       Assessment/Plan   Invasive adenocarcinoma of pancreas, MSI-S, +KRAS p.G12D, TP53 p.R342  - Initial CA 19-9 less than 4   - Patient was initially presented to the emergency room on 1/2/25 for worsening abdominal pain.   - CT abdomen pelvis at that time revealed a defined mass involving the uncinate process of the pancreas  - biopsy on 1/29/25, which showed invasive adenocarcinoma poorly differentiated. MSI status intact  - CT of the chest on 1/24/25 showed no clear evidence of metastases  - met with Dr. Castro on 1/23/25 and presents to discuss neoadjuvant  chemotherapy today  - patient has a good PS and discussed FOLFIRINOS with 5-FU, oxaliplatin, and irinotecan  - 2/18/25 C1D1  - one variant of UTG1A1 gene and given diarrhea will dose reduce iriniotecan by 25% for C2  - 4/11/25 CT c/a/p with progression of pancreatic mass and new liver lesions    4/14/25:  -Today we reviewed at length recent CT of the chest abdomen pelvis and reviewed images together, which does show increase in size of the pancreatic mass and new liver lesions concerning for metastases  -Reviewed this would not be considered stage IV advanced pancreatic cancer and goal for systemic treatment would be for palliative intent  -Discussed I do want to switch his systemic chemotherapy to gemcitabine plus Abraxane, reviewed side effects and consent signed  - repeat Guardant 360 and initial signatera today  -Prior Tempus did reveal a KRAS mutation and we could consider clinical trial at progression  - will plan to start C1 on 4/17/35  - michelle short term CT after 2 cycles  - Patient would like to discuss with radiation though they understand that would not be indicated until we can get better control systemically, referral placed to Dr. Celestin    Reviewed ongoing medical problems and how they relate to his malignancy,  will continue long term monitoring.    RTC with wilmar Garza for C2  This note has been transcribed using a medical scribe and there is a possibility of unintentional typing misprints    Diagnoses and all orders for this visit:  Adenocarcinoma of pancreas (Multi)  -     Clinic Appointment Request; Future  -     Infusion Appointment Request; Future  -     Infusion Appointment Request; Future  -     CBC and Auto Differential; Future  -     Comprehensive metabolic panel; Future  -     Infusion Appointment Request; Future  -     CBC and Auto Differential; Future  -     Comprehensive metabolic panel; Future  -     Referral to Radiation Oncology; Future  -     signatera; Other-indicate in comment; unknown - Miscellaneous Test; Future  -     guardant 360; Other-indicate in comment; unknown - Miscellaneous Test; Future  -     CBC and Auto Differential; Future  -     Comprehensive metabolic panel; Future  -     CBC and Auto Differential; Future  -     Comprehensive metabolic panel; Future  -     Clinic Appointment Request; Future  -     CBC and Auto Differential; Future  -     Comprehensive metabolic panel; Future  -     Infusion Appointment Request; Future  -     Infusion Appointment Request; Future  -     Infusion Appointment Request; Future  Other orders  -     ondansetron (Zofran) injection 8 mg  -     prochlorperazine (Compazine) tablet 10 mg  -     prochlorperazine (Compazine) injection 10 mg  -     albumin-bound PACLitaxel 255 mg IV (Abraxane) 65 mL  -     gemcitabine (Gemzar) 2,021.6 mg in sodium chloride 0.9% 303.2 mL IV  -     sodium chloride 0.9 % bolus 500 mL  -     dextrose 5 % in water (D5W) bolus 500 mL  -     diphenhydrAMINE (BENADryl) injection 50 mg  -     methylPREDNISolone sod succinate (SOLU-Medrol) 40 mg/mL injection 40 mg  -     famotidine PF (Pepcid) injection 20 mg  -     EPINEPHrine (Epipen) injection syringe 0.3 mg  -     albuterol 2.5 mg /3 mL (0.083 %) nebulizer solution 3 mL  -      ondansetron (Zofran) injection 8 mg  -     prochlorperazine (Compazine) tablet 10 mg  -     prochlorperazine (Compazine) injection 10 mg  -     albumin-bound PACLitaxel 255 mg IV (Abraxane) 65 mL  -     gemcitabine (Gemzar) 2,021.6 mg in sodium chloride 0.9% 303.2 mL IV  -     sodium chloride 0.9 % bolus 500 mL  -     dextrose 5 % in water (D5W) bolus 500 mL  -     diphenhydrAMINE (BENADryl) injection 50 mg  -     methylPREDNISolone sod succinate (SOLU-Medrol) 40 mg/mL injection 40 mg  -     famotidine PF (Pepcid) injection 20 mg  -     EPINEPHrine (Epipen) injection syringe 0.3 mg  -     albuterol 2.5 mg /3 mL (0.083 %) nebulizer solution 3 mL  -     ondansetron (Zofran) injection 8 mg  -     prochlorperazine (Compazine) tablet 10 mg  -     prochlorperazine (Compazine) injection 10 mg  -     albumin-bound PACLitaxel 255 mg IV (Abraxane) 65 mL  -     gemcitabine (Gemzar) 2,021.6 mg in sodium chloride 0.9% 303.2 mL IV  -     sodium chloride 0.9 % bolus 500 mL  -     dextrose 5 % in water (D5W) bolus 500 mL  -     diphenhydrAMINE (BENADryl) injection 50 mg  -     methylPREDNISolone sod succinate (SOLU-Medrol) 40 mg/mL injection 40 mg  -     famotidine PF (Pepcid) injection 20 mg  -     EPINEPHrine (Epipen) injection syringe 0.3 mg  -     albuterol 2.5 mg /3 mL (0.083 %) nebulizer solution 3 mL      Ashley Mesa MD  Hematology/Oncology  [facility]      Otoniel Attestation  By signing my name below, I, Otoniel Fallon, attest that this documentation has been prepared under the direction and in the presence of Ashley Mesa MD.    Time Spent  Prep time on day of patient encounter: 5 minutes  Time spent directly with patient, family or caregiver: 55 minutes  Additional Time Spent on Patient Care Activities: 8 minutes  Documentation Time: 8 minutes  Other Time Spent: 0 minutes  Total: 76 minutes

## 2025-04-14 ENCOUNTER — LAB (OUTPATIENT)
Dept: LAB | Facility: CLINIC | Age: 60
End: 2025-04-14
Payer: COMMERCIAL

## 2025-04-14 DIAGNOSIS — C25.9 ADENOCARCINOMA OF PANCREAS (MULTI): ICD-10-CM

## 2025-04-14 LAB
ALBUMIN SERPL BCP-MCNC: 3.9 G/DL (ref 3.4–5)
ALP SERPL-CCNC: 85 U/L (ref 33–136)
ALT SERPL W P-5'-P-CCNC: 73 U/L (ref 10–52)
ANION GAP SERPL CALC-SCNC: 12 MMOL/L (ref 10–20)
AST SERPL W P-5'-P-CCNC: 27 U/L (ref 9–39)
BASOPHILS # BLD AUTO: 0.01 X10*3/UL (ref 0–0.1)
BASOPHILS NFR BLD AUTO: 0.2 %
BILIRUB SERPL-MCNC: 0.5 MG/DL (ref 0–1.2)
BUN SERPL-MCNC: 10 MG/DL (ref 6–23)
CALCIUM SERPL-MCNC: 8.7 MG/DL (ref 8.6–10.3)
CHLORIDE SERPL-SCNC: 110 MMOL/L (ref 98–107)
CO2 SERPL-SCNC: 24 MMOL/L (ref 21–32)
CREAT SERPL-MCNC: 0.72 MG/DL (ref 0.5–1.3)
EGFRCR SERPLBLD CKD-EPI 2021: >90 ML/MIN/1.73M*2
EOSINOPHIL # BLD AUTO: 0.05 X10*3/UL (ref 0–0.7)
EOSINOPHIL NFR BLD AUTO: 1 %
ERYTHROCYTE [DISTWIDTH] IN BLOOD BY AUTOMATED COUNT: 16.6 % (ref 11.5–14.5)
GLUCOSE SERPL-MCNC: 140 MG/DL (ref 74–99)
HCT VFR BLD AUTO: 36 % (ref 41–52)
HGB BLD-MCNC: 11.9 G/DL (ref 13.5–17.5)
IMM GRANULOCYTES # BLD AUTO: 0 X10*3/UL (ref 0–0.7)
IMM GRANULOCYTES NFR BLD AUTO: 0 % (ref 0–0.9)
LYMPHOCYTES # BLD AUTO: 1.79 X10*3/UL (ref 1.2–4.8)
LYMPHOCYTES NFR BLD AUTO: 35.6 %
MCH RBC QN AUTO: 30.8 PG (ref 26–34)
MCHC RBC AUTO-ENTMCNC: 33.1 G/DL (ref 32–36)
MCV RBC AUTO: 93 FL (ref 80–100)
MONOCYTES # BLD AUTO: 0.38 X10*3/UL (ref 0.1–1)
MONOCYTES NFR BLD AUTO: 7.6 %
NEUTROPHILS # BLD AUTO: 2.8 X10*3/UL (ref 1.2–7.7)
NEUTROPHILS NFR BLD AUTO: 55.6 %
PLATELET # BLD AUTO: 164 X10*3/UL (ref 150–450)
POTASSIUM SERPL-SCNC: 3.7 MMOL/L (ref 3.5–5.3)
PROT SERPL-MCNC: 5.9 G/DL (ref 6.4–8.2)
RBC # BLD AUTO: 3.86 X10*6/UL (ref 4.5–5.9)
SODIUM SERPL-SCNC: 142 MMOL/L (ref 136–145)
WBC # BLD AUTO: 5 X10*3/UL (ref 4.4–11.3)

## 2025-04-14 PROCEDURE — 84075 ASSAY ALKALINE PHOSPHATASE: CPT

## 2025-04-14 PROCEDURE — 85025 COMPLETE CBC W/AUTO DIFF WBC: CPT

## 2025-04-15 ENCOUNTER — OFFICE VISIT (OUTPATIENT)
Dept: HEMATOLOGY/ONCOLOGY | Facility: CLINIC | Age: 60
End: 2025-04-15
Payer: COMMERCIAL

## 2025-04-15 ENCOUNTER — INFUSION (OUTPATIENT)
Dept: HEMATOLOGY/ONCOLOGY | Facility: CLINIC | Age: 60
End: 2025-04-15
Payer: COMMERCIAL

## 2025-04-15 VITALS
HEART RATE: 75 BPM | SYSTOLIC BLOOD PRESSURE: 121 MMHG | BODY MASS INDEX: 27.3 KG/M2 | OXYGEN SATURATION: 98 % | TEMPERATURE: 97.7 F | RESPIRATION RATE: 16 BRPM | WEIGHT: 185.19 LBS | DIASTOLIC BLOOD PRESSURE: 68 MMHG

## 2025-04-15 DIAGNOSIS — C25.9 ADENOCARCINOMA OF PANCREAS (MULTI): Primary | ICD-10-CM

## 2025-04-15 DIAGNOSIS — C25.9 ADENOCARCINOMA OF PANCREAS (MULTI): ICD-10-CM

## 2025-04-15 PROCEDURE — G2211 COMPLEX E/M VISIT ADD ON: HCPCS | Performed by: INTERNAL MEDICINE

## 2025-04-15 PROCEDURE — 99417 PROLNG OP E/M EACH 15 MIN: CPT | Performed by: INTERNAL MEDICINE

## 2025-04-15 PROCEDURE — 3074F SYST BP LT 130 MM HG: CPT | Performed by: INTERNAL MEDICINE

## 2025-04-15 PROCEDURE — 36591 DRAW BLOOD OFF VENOUS DEVICE: CPT

## 2025-04-15 PROCEDURE — 99215 OFFICE O/P EST HI 40 MIN: CPT | Performed by: INTERNAL MEDICINE

## 2025-04-15 PROCEDURE — 3078F DIAST BP <80 MM HG: CPT | Performed by: INTERNAL MEDICINE

## 2025-04-15 PROCEDURE — 89240 UNLISTED MISC PATH TEST: CPT

## 2025-04-15 RX ORDER — EPINEPHRINE 0.3 MG/.3ML
0.3 INJECTION SUBCUTANEOUS EVERY 5 MIN PRN
OUTPATIENT
Start: 2025-04-24

## 2025-04-15 RX ORDER — PROCHLORPERAZINE EDISYLATE 5 MG/ML
10 INJECTION INTRAMUSCULAR; INTRAVENOUS EVERY 6 HOURS PRN
OUTPATIENT
Start: 2025-04-24

## 2025-04-15 RX ORDER — ALBUTEROL SULFATE 0.83 MG/ML
3 SOLUTION RESPIRATORY (INHALATION) AS NEEDED
Status: CANCELLED | OUTPATIENT
Start: 2025-04-17

## 2025-04-15 RX ORDER — DIPHENHYDRAMINE HYDROCHLORIDE 50 MG/ML
50 INJECTION, SOLUTION INTRAMUSCULAR; INTRAVENOUS AS NEEDED
OUTPATIENT
Start: 2025-04-24

## 2025-04-15 RX ORDER — ALBUTEROL SULFATE 0.83 MG/ML
3 SOLUTION RESPIRATORY (INHALATION) AS NEEDED
OUTPATIENT
Start: 2025-04-24

## 2025-04-15 RX ORDER — FAMOTIDINE 10 MG/ML
20 INJECTION, SOLUTION INTRAVENOUS ONCE AS NEEDED
Status: CANCELLED | OUTPATIENT
Start: 2025-04-17

## 2025-04-15 RX ORDER — DIPHENHYDRAMINE HYDROCHLORIDE 50 MG/ML
50 INJECTION, SOLUTION INTRAMUSCULAR; INTRAVENOUS AS NEEDED
OUTPATIENT
Start: 2025-05-01

## 2025-04-15 RX ORDER — PROCHLORPERAZINE EDISYLATE 5 MG/ML
10 INJECTION INTRAMUSCULAR; INTRAVENOUS EVERY 6 HOURS PRN
OUTPATIENT
Start: 2025-05-01

## 2025-04-15 RX ORDER — PROCHLORPERAZINE MALEATE 10 MG
10 TABLET ORAL EVERY 6 HOURS PRN
OUTPATIENT
Start: 2025-04-24

## 2025-04-15 RX ORDER — ONDANSETRON HYDROCHLORIDE 2 MG/ML
8 INJECTION, SOLUTION INTRAVENOUS ONCE
OUTPATIENT
Start: 2025-04-24

## 2025-04-15 RX ORDER — ALBUTEROL SULFATE 0.83 MG/ML
3 SOLUTION RESPIRATORY (INHALATION) AS NEEDED
OUTPATIENT
Start: 2025-05-01

## 2025-04-15 RX ORDER — ONDANSETRON HYDROCHLORIDE 2 MG/ML
8 INJECTION, SOLUTION INTRAVENOUS ONCE
OUTPATIENT
Start: 2025-05-01

## 2025-04-15 RX ORDER — PROCHLORPERAZINE EDISYLATE 5 MG/ML
10 INJECTION INTRAMUSCULAR; INTRAVENOUS EVERY 6 HOURS PRN
Status: CANCELLED | OUTPATIENT
Start: 2025-04-17

## 2025-04-15 RX ORDER — DIPHENHYDRAMINE HYDROCHLORIDE 50 MG/ML
50 INJECTION, SOLUTION INTRAMUSCULAR; INTRAVENOUS AS NEEDED
Status: CANCELLED | OUTPATIENT
Start: 2025-04-17

## 2025-04-15 RX ORDER — EPINEPHRINE 0.3 MG/.3ML
0.3 INJECTION SUBCUTANEOUS EVERY 5 MIN PRN
Status: CANCELLED | OUTPATIENT
Start: 2025-04-17

## 2025-04-15 RX ORDER — FAMOTIDINE 10 MG/ML
20 INJECTION, SOLUTION INTRAVENOUS ONCE AS NEEDED
OUTPATIENT
Start: 2025-04-24

## 2025-04-15 RX ORDER — FAMOTIDINE 10 MG/ML
20 INJECTION, SOLUTION INTRAVENOUS ONCE AS NEEDED
OUTPATIENT
Start: 2025-05-01

## 2025-04-15 RX ORDER — ONDANSETRON HYDROCHLORIDE 2 MG/ML
8 INJECTION, SOLUTION INTRAVENOUS ONCE
Status: CANCELLED | OUTPATIENT
Start: 2025-04-17

## 2025-04-15 RX ORDER — PROCHLORPERAZINE MALEATE 10 MG
10 TABLET ORAL EVERY 6 HOURS PRN
OUTPATIENT
Start: 2025-05-01

## 2025-04-15 RX ORDER — EPINEPHRINE 0.3 MG/.3ML
0.3 INJECTION SUBCUTANEOUS EVERY 5 MIN PRN
OUTPATIENT
Start: 2025-05-01

## 2025-04-15 RX ORDER — HEPARIN 100 UNIT/ML
500 SYRINGE INTRAVENOUS AS NEEDED
Status: CANCELLED | OUTPATIENT
Start: 2025-04-15

## 2025-04-15 RX ORDER — PROCHLORPERAZINE MALEATE 10 MG
10 TABLET ORAL EVERY 6 HOURS PRN
Status: CANCELLED | OUTPATIENT
Start: 2025-04-17

## 2025-04-15 RX ORDER — HEPARIN SODIUM,PORCINE/PF 10 UNIT/ML
50 SYRINGE (ML) INTRAVENOUS AS NEEDED
Status: CANCELLED | OUTPATIENT
Start: 2025-04-15

## 2025-04-15 ASSESSMENT — PAIN SCALES - GENERAL: PAINLEVEL_OUTOF10: 0-NO PAIN

## 2025-04-16 ASSESSMENT — ENCOUNTER SYMPTOMS
EYES NEGATIVE: 1
VOMITING: 0
PSYCHIATRIC NEGATIVE: 1
NEUROLOGICAL NEGATIVE: 1
CHILLS: 0
UNEXPECTED WEIGHT CHANGE: 0
CARDIOVASCULAR NEGATIVE: 1
RESPIRATORY NEGATIVE: 1
HEMATOLOGIC/LYMPHATIC NEGATIVE: 1
ENDOCRINE NEGATIVE: 1
FEVER: 0
DIARRHEA: 0
MUSCULOSKELETAL NEGATIVE: 1

## 2025-04-16 NOTE — PROGRESS NOTES
"  Patient ID: Sathish Iqbal \"Hardy\" is a 59 y.o. male.  Diagnosis:  Pancreatic Cancer  MedOnc: Dr. Mesa  SurgOnc: Sr. Castro   Primary Care Provider: Tamy Pascual MD  Referring Provider: No referring provider defined for this encounter.  Visit Type: Follow Up    Date of Service: 04/24/25  Location: [facility]     Patient Care Team:  Tamy Pascual MD as PCP - General  Tamy Pascual MD as PCP - [facility] PCP  Eliezer Puente DO as Cardiologist (Cardiology)  Ashley Mesa MD as Consulting Physician (Hematology and Oncology)  LILLIAN Lambert as  (Oncology)    Current Therapy: mFOLFIRINOX     ONCOLOGIC HISTORY     No matching staging information was found for the patient.    Invasive adenocarcinoma of pancreas, MSI-S  Initial CA 19-9 less than 4   1/2/2025: ED for abd pain. CT abdomen pelvis showed pancreatic mass   1/29/2025: biopsy - adenocarcinoma poorly differentiated. MSI status intact  1/23/2025: Saw Dr. Castro   1/24/2025: CT of the chest - no evidence of metastases  2/12/2025: Port placement  2/15/2025: Tempus somatic testing - No reportable pathogenic variants were found  2/18/2025: Cycle 1 FOLFIRINOX   3/4/2025: has a copy of UTG1A1 gene, d/t diarrhea; irinotecan reduced by 25%, cycle 2   3/18/2025: Cycle 3 FOLFIRINOX   4/1/2025: Cycle 4 FOLFIRINOX   4/11/2025: CT CAP increase in size of the pancreatic mass and new liver lesions concerning for metastases; now be considered stage IV advanced pancreatic cancer and goal for systemic treatment would be for palliative intent  - switch systemic chemotherapy to gemcitabine plus Abraxane  - repeat Guardant 360 and initial signatera   - Prior Tempus did reveal a KRAS mutation and we could consider clinical trial at progression  4/17/2025: C1 D1 Proctor Abraxane   4/24/2025: C1 D8 Proctor Abraxane     Oncology History   Adenocarcinoma of pancreas (Multi)   2/3/2025 Initial Diagnosis    Adenocarcinoma of pancreas (Multi)     2/18/2025 - " "4/3/2025 Chemotherapy    mFOLFIRINOX (Fluorouracil Continuous Infusion / Leucovorin / Irinotecan / Oxaliplatin), 14 Day Cycles     4/17/2025 -  Chemotherapy    Albumin-bound PACLitaxel / Gemcitabine (Day 1, 8, and 15), 28 Day Cycles     7/22/2025 - 7/22/2025 Chemotherapy    mFOLFIRINOX (Fluorouracil Continuous Infusion / Leucovorin / Irinotecan / Oxaliplatin), 14 Day Cycles        Other Contributing History  NSTEMI, CAD, stents, HLD, HTN    Subjective      INTERVAL HISTORY     Sathish Iqbal \"Hardy\" is a 59 y.o. male who presents today for follow up of pancreatic adenocarcinoma. Patient of Dr. Mesa currently on gem/abraxane. Here today for C1 Day 8. He feels good. Denies pain. No GI issues. No signs of infection. Tolerated C1 D1 well with no new side effects. Energy is good. Continues to work. Eating well.     Review of Systems   Constitutional:  Negative for appetite change, chills, fatigue, fever and unexpected weight change.   HENT:  Negative.     Eyes: Negative.    Respiratory: Negative.     Cardiovascular: Negative.    Gastrointestinal:  Negative for abdominal pain, constipation, diarrhea, nausea and vomiting.   Endocrine: Negative.    Genitourinary: Negative.     Musculoskeletal: Negative.    Skin: Negative.    Neurological: Negative.    Hematological: Negative.    Psychiatric/Behavioral: Negative.         Objective      /65 (BP Location: Right arm, Patient Position: Sitting, BP Cuff Size: Adult)   Pulse 70   Temp 36.3 °C (97.3 °F) (Temporal)   Resp 17   Wt 83.5 kg (184 lb 1.4 oz)   SpO2 98%   BMI 27.14 kg/m²   BSA: 2.02 meters squared    Wt Readings from Last 5 Encounters:   04/24/25 83.5 kg (184 lb 1.4 oz)   04/17/25 84.6 kg (186 lb 8.2 oz)   04/15/25 84 kg (185 lb 3 oz)   04/03/25 85 kg (187 lb 6.3 oz)   04/01/25 83.9 kg (185 lb)     Performance Status:  ECOG Score: 0- Fully active, able to carry on all pre-disease performance w/o restriction.  Karnofsky Score: 90 - Able to carry on normal " activity; minor signs or symptoms of disease     PHYSICAL EXAM   Physical Exam  Constitutional:       General: He is not in acute distress.     Appearance: Normal appearance. He is not toxic-appearing.   HENT:      Head: Normocephalic and atraumatic.   Eyes:      Pupils: Pupils are equal, round, and reactive to light.   Pulmonary:      Effort: Pulmonary effort is normal.   Musculoskeletal:         General: Normal range of motion.      Cervical back: Normal range of motion.      Right lower leg: No edema.      Left lower leg: No edema.   Neurological:      General: No focal deficit present.      Mental Status: He is alert and oriented to person, place, and time.      Motor: No weakness.   Psychiatric:         Mood and Affect: Mood normal.         Behavior: Behavior normal.         Thought Content: Thought content normal.         Judgment: Judgment normal.         Allergies  No Known Allergies   Medications  Current Outpatient Medications   Medication Instructions    aspirin 81 mg, oral, Daily    atorvastatin (LIPITOR) 40 mg, oral, Nightly    clopidogrel (PLAVIX) 75 mg, oral, Daily    lipase-protease-amylase (Creon) 24,000-76,000 -120,000 unit capsule Take 2 capsules with meals Take 1 capsule with snacks    loperamide (Imodium) 2 mg capsule Take 2 capsules (4 mg) by mouth with the first episode of diarrhea and 1 capsule (2 mg) by mouth with any additional episodes. Maximum 8 capsules (16 mg) per day.    losartan (COZAAR) 50 mg, oral, Daily    metoprolol succinate XL (TOPROL-XL) 50 mg, oral, Daily    nitroglycerin (NITROSTAT) 0.4 mg, sublingual, Every 5 min PRN    ondansetron (ZOFRAN) 8 mg, oral, Every 8 hours PRN    potassium chloride CR (Klor-Con) 10 mEq ER tablet 20 mEq, oral, Daily, Do not crush, chew, or split.    prochlorperazine (COMPAZINE) 10 mg, oral, Every 6 hours PRN    sildenafil (VIAGRA) 50 mg, As needed        Diagnostic Results   RESULTS     Results for orders placed or performed in visit on 04/23/25  (from the past 96 hours)   CBC and Auto Differential   Result Value Ref Range    WBC 2.8 (L) 4.4 - 11.3 x10*3/uL    nRBC      RBC 3.45 (L) 4.50 - 5.90 x10*6/uL    Hemoglobin 10.7 (L) 13.5 - 17.5 g/dL    Hematocrit 32.2 (L) 41.0 - 52.0 %    MCV 93 80 - 100 fL    MCH 31.0 26.0 - 34.0 pg    MCHC 33.2 32.0 - 36.0 g/dL    RDW 15.8 (H) 11.5 - 14.5 %    Platelets 160 150 - 450 x10*3/uL    Neutrophils % 32.9 40.0 - 80.0 %    Immature Granulocytes %, Automated 0.4 0.0 - 0.9 %    Lymphocytes % 59.6 13.0 - 44.0 %    Monocytes % 6.4 2.0 - 10.0 %    Eosinophils % 0.7 0.0 - 6.0 %    Basophils % 0.0 0.0 - 2.0 %    Neutrophils Absolute 0.92 (L) 1.20 - 7.70 x10*3/uL    Immature Granulocytes Absolute, Automated 0.01 0.00 - 0.70 x10*3/uL    Lymphocytes Absolute 1.67 1.20 - 4.80 x10*3/uL    Monocytes Absolute 0.18 0.10 - 1.00 x10*3/uL    Eosinophils Absolute 0.02 0.00 - 0.70 x10*3/uL    Basophils Absolute 0.00 0.00 - 0.10 x10*3/uL   Comprehensive metabolic panel   Result Value Ref Range    Glucose 117 (H) 74 - 99 mg/dL    Sodium 142 136 - 145 mmol/L    Potassium 3.7 3.5 - 5.3 mmol/L    Chloride 111 (H) 98 - 107 mmol/L    Bicarbonate 24 21 - 32 mmol/L    Anion Gap 11 10 - 20 mmol/L    Urea Nitrogen 10 6 - 23 mg/dL    Creatinine 0.59 0.50 - 1.30 mg/dL    eGFR >90 >60 mL/min/1.73m*2    Calcium 8.7 8.6 - 10.3 mg/dL    Albumin 3.7 3.4 - 5.0 g/dL    Alkaline Phosphatase 72 33 - 136 U/L    Total Protein 5.8 (L) 6.4 - 8.2 g/dL    AST 25 9 - 39 U/L    Bilirubin, Total 0.4 0.0 - 1.2 mg/dL    ALT 43 10 - 52 U/L       Recent Imaging     4/11/2025 CT CAP   IMPRESSION:  CHEST:  1.  No convincing evidence for metastatic lesions. See discussion  above.      ABDOMEN-PELVIS:  1.  The increased size of locally aggressive pancreas mass with  vascular and involvement resulting in occlusion of the superior  aspect superior mesenteric vein, formation of multiple venous  collateral vessels, and trace dependent peritoneal free fluid.  2. There are multiple  "liver masses likely metastatic lesions which  are new from the prior exam.        Assessment/Plan   ASSESSMENT     Sathish Iqbal is a 59 y.o. male with invasive adenocarcinoma of pancreas, MSI-S diagnosed in January 2025 after present with abdominal pain who presents in follow up for evaluation prior to C1 Day 8 Trigg/Abraxane. He started neoadjuvant mFOLFIRINOX on 2/18/2025. DPYD Phenotype: Normal Metabolizer, DPYD Activity Score: 2.0. Has copy of UGT1A1 gene and developed diarrhea, irinotecan was dose reduced for cycle 2. He completed 4 cycles of FOLFIRINOX but unfortunately new liver mets and increased size in the pancreatic mass was noted on his CT scan on 4/11/2025. He is now stage IV and treatment is considered palliative. He was switched to Gemcitabine and Abraxane started on 4/17/2025. Prior Tempus did reveal a KRAS mutation and we could consider clinical trial at progression. Plan for short term CT after 2 cycles. Patient would like to discuss with radiation though they understand that would not be indicated until we can get better control systemically, referral placed to Dr. Celestin - he needed to reschedule his appt from 4/22 - he will reschedule next week.     Patient tolerated C1 D1 well with no side effects. His ANC is slightly low at 920, no signs of infection, will proceed today and monitor. May require an adjustment in the future. CMP unremarkable.     PLAN     # Pancreatic cancer  - Proceed with Gemcitabine + Abraxane C1 D8 today   - Treatment is Day 1, 8, 15; repeated every 28 days.   - CT scan after Cycle 2   - Reschedule XRT consultation     # Neutropenia  - Ok to proceed today, will monitor closely     Follow up in 3 weeks with Dr. Mesa and Cycle 2 as scheduled.     Sathish \"Hardy\" was seen today for op infusion and follow-up.  Diagnoses and all orders for this visit:  Adenocarcinoma of pancreas (Multi) (Primary)      Venous Access Orders  Albumin-bound PACLitaxel / Gemcitabine (Day 1, 8, and " 15), 28 Day Cycles    Patient verbalizes understanding of above plan. Time provided for patient's questions. All questions answered to patient's satisfaction in office. Patient instructed to reach out for any new concerning issues at [phone].    Rolanda Gallardo MSN, APRN, A-GNP-C, AOCNP  [facility]  Division of Hematology & Medical Oncology   [place] [address]  Phone: [phone]  Available via [employer] Secure Chat  [email or web address]

## 2025-04-17 ENCOUNTER — APPOINTMENT (OUTPATIENT)
Dept: HEMATOLOGY/ONCOLOGY | Facility: CLINIC | Age: 60
End: 2025-04-17
Payer: COMMERCIAL

## 2025-04-17 ENCOUNTER — INFUSION (OUTPATIENT)
Dept: HEMATOLOGY/ONCOLOGY | Facility: CLINIC | Age: 60
End: 2025-04-17
Payer: COMMERCIAL

## 2025-04-17 VITALS
SYSTOLIC BLOOD PRESSURE: 112 MMHG | RESPIRATION RATE: 16 BRPM | OXYGEN SATURATION: 98 % | TEMPERATURE: 96.6 F | BODY MASS INDEX: 27.62 KG/M2 | HEART RATE: 70 BPM | HEIGHT: 69 IN | DIASTOLIC BLOOD PRESSURE: 66 MMHG | WEIGHT: 186.51 LBS

## 2025-04-17 DIAGNOSIS — C25.9 ADENOCARCINOMA OF PANCREAS (MULTI): ICD-10-CM

## 2025-04-17 PROCEDURE — 2500000004 HC RX 250 GENERAL PHARMACY W/ HCPCS (ALT 636 FOR OP/ED): Performed by: INTERNAL MEDICINE

## 2025-04-17 PROCEDURE — 2500000004 HC RX 250 GENERAL PHARMACY W/ HCPCS (ALT 636 FOR OP/ED): Mod: JZ | Performed by: INTERNAL MEDICINE

## 2025-04-17 PROCEDURE — 96413 CHEMO IV INFUSION 1 HR: CPT

## 2025-04-17 PROCEDURE — 96375 TX/PRO/DX INJ NEW DRUG ADDON: CPT | Mod: INF

## 2025-04-17 PROCEDURE — 96417 CHEMO IV INFUS EACH ADDL SEQ: CPT

## 2025-04-17 RX ORDER — ONDANSETRON HYDROCHLORIDE 2 MG/ML
8 INJECTION, SOLUTION INTRAVENOUS ONCE
Status: COMPLETED | OUTPATIENT
Start: 2025-04-17 | End: 2025-04-17

## 2025-04-17 RX ORDER — HEPARIN SODIUM,PORCINE/PF 10 UNIT/ML
50 SYRINGE (ML) INTRAVENOUS AS NEEDED
OUTPATIENT
Start: 2025-04-17

## 2025-04-17 RX ORDER — HEPARIN 100 UNIT/ML
500 SYRINGE INTRAVENOUS AS NEEDED
OUTPATIENT
Start: 2025-04-17

## 2025-04-17 RX ADMIN — GEMCITABINE HYDROCHLORIDE 2021.6 MG: 2 INJECTION, SOLUTION INTRAVENOUS at 14:45

## 2025-04-17 RX ADMIN — ONDANSETRON 8 MG: 2 INJECTION INTRAMUSCULAR; INTRAVENOUS at 13:13

## 2025-04-17 RX ADMIN — PACLITAXEL 255 MG: 100 INJECTION, POWDER, LYOPHILIZED, FOR SUSPENSION INTRAVENOUS at 14:08

## 2025-04-17 ASSESSMENT — PAIN SCALES - GENERAL: PAINLEVEL_OUTOF10: 0-NO PAIN

## 2025-04-17 NOTE — SIGNIFICANT EVENT
04/17/25 1240   Prechemo Checklist   Has the patient been in the hospital, ED, or urgent care since last date of service No   Chemo/Immuno Consent Completed and Signed Yes  (4/15/25)   Protocol/Indications Verified Yes   Confirmed to previous date/time of medication N/A  (C1D1 today)   Compared to previous dose N/A  (C1D1 today)   All medications are dated accurately Yes   Pregnancy Test Negative Not applicable   Parameters Met Yes  (labs done 4/14/25 - ANC: 2.80, PLTS: 164, bili: 0.5)   BSA/Weight-Height Verified Yes   Dose Calculations Verified (current, total, cumulative) Yes

## 2025-04-21 NOTE — PROGRESS NOTES
Staff Physician: Ernestina Celestin MD    Referring Physician: Ashley Mesa MD  Date of Service: 4/22/2025    RADIATION ONCOLOGY CONSULT NOTE    IDENTIFYING DATA:  Cancer Staging   No matching staging information was found for the patient.    Problem List Items Addressed This Visit    None          IDENTIFICATION:  Ms. Sathish Iqbal is a 59-year-old with cT3N0 pancreatic head/uncinate adenocarcinoma (3.5 cm with SMV involvement) treated with FOLOFIRINOX x 6 cycles with progression at primary site (duodenal encroachment, SMA involvement, occlusion SMV) and multiple new liver mets.  He initiated gem/abraxane last week.  He presents today to discuss radiation therapy options.    ONCOLOGIC HISTORY:    1/2/2025 CT A/P (epigastric pain): 3.7 x 3.4 cm low attenuation area involving uncinate process of pancrease concerning for malignancy.  No pancreatic duct dilatation.  Mass abuts branches of SMV.      1/23/2025 CA 19-9 < 4.0    1/23/2025 Dr Castro consultation: Pancreatic adenocarcinoma with SMV involvement. Recommendation: Neoadjuvant therapy with consideration of surgery based on response.     1/24/2025 CT Chest: 0.3 cm nodule RML and 0.2 cm nodule LLL.  Attention to FU.    1/29/2025 EUS/ FNA pancreas head mass.  On EUS: 3.5 x 3.0 cm mass head of pancrease and uncinate process.  Pancreatic duct and bile duct appear normal.  Pathology  invasive adenocarcinoma moderately to poorly differentiated.  MMR-proficient.    2/11/2025 [facility] - kras mutation    2/18/2025- 3/[id number] FOLFIRINOX     3/17/2025 CA 19-9 < 4.0    4/11/2025 CT C/A/P: Multiple liver masses consistent with metastatic disease, new compared to prior.  Lesions are in all segments.  Largest measures 1.3 cm.  Persistent mass inferior aspect pancreatic head locally invasive with loss of fat plane between adjacent duodenum and mass as as as along short segment of SMA.  There is occlusion of the superior aspect of SMV.  This is new compared to prior. New onset  mild dilation of pancreatic duct centrally.  Mass now measures 3.1 x 2.3 cm.    4/17/2025- present:  [place] - abraxane (s/p 1 cycle)    3/29/2025 Germline Genetic Testing [facility] 39 gene panel negative    PAST MEDICAL HISTORY:  Medical History[1]  PAST SURGICAL HISTORY:  Surgical History[2]  ALLERGIES:  Allergies[3]  MEDICATIONS:  Current Medications[4]   SOCIAL HISTORY:  Social History     Tobacco Use    Smoking status: Every Day     Current packs/day: 0.25     Average packs/day: 0.3 packs/day for 43.3 years (10.8 ttl pk-yrs)     Types: Cigarettes     Start date: 1982    Smokeless tobacco: Never   Substance Use Topics    Alcohol use: Not Currently     FAMILY HISTORY:  Family History[5]    REVIEW OF SYSTEMS:  Except for the symptoms described above, the review of systems is negative. Specifically, except as noted in the HPI, when asked the patient expressed no complaints relative to constitutional (fever, weight loss), eyes, ears, nose, mouth, throat, neurologic, cardiovascular, pulmonary, breast, GI, , skin, musculoskeletal, endocrine, hematologic/lymphatic, or immunologic systems.    RADIATION SCREENING QUESTIONS:  Prior radiation therapy: No  Pacemaker: {YES-DESCRIBE/NO:26191}  Other implantable devices: {YES-DESCRIBE/NO:77608}  Connective tissue disease: {YES-DESCRIBE/NO:41525}    PERFORMANCE STATUS:  Karnofsky Performance Score/ECOG: {DESC; KARNOFSKY SCALE WITH ECOG EQUIVALENT:78469}    PHYSICAL EXAMINATION:  There were no vitals taken for this visit.  Pain score: {0 - 10:57936}/10  ***    LABORATORY AND IMAGING DATA:  Imaging: All imaging was personally reviewed and interpreted in clinic. Findings as per HPI and EMR.    Laboratory/Pathology:  All pertinent labs and pathology were personally reviewed and interpreted in clinic. Findings as per HPI and EMR.    IMPRESSION:  ***    PLAN:  For  M***. ***, I would recommend a course of ***. Specifically, I would recommend *** Acute effects could include but are not  limited to: *** Late effects could include but are not limited to: ***. The patient accepts these risks and signed informed consent. *** The patient knows to call us anytime with any questions or concerns.***      Thank you for the opportunity to participate in the care of this pleasant ***.    Ernestina Celestin MD          [1]   Past Medical History:  Diagnosis Date    Hyperlipidemia     RCA occlusion (Multi) 06/22/2023   [2]   Past Surgical History:  Procedure Laterality Date    OTHER SURGICAL HISTORY  04/16/2021    No history of surgery   [3] No Known Allergies  [4]   Current Outpatient Medications:     aspirin 81 mg chewable tablet, CHEW AND SWALLOW ONE TABLET BY MOUTH DAILY, Disp: 90 tablet, Rfl: 3    atorvastatin (Lipitor) 40 mg tablet, Take 1 tablet (40 mg) by mouth once daily at bedtime., Disp: 90 tablet, Rfl: 3    clopidogrel (Plavix) 75 mg tablet, Take 1 tablet (75 mg) by mouth once daily., Disp: 90 tablet, Rfl: 3    lipase-protease-amylase (Creon) 24,000-76,000 -120,000 unit capsule, Take 2 capsules with meals Take 1 capsule with snacks, Disp: 240 capsule, Rfl: 6    loperamide (Imodium) 2 mg capsule, Take 2 capsules (4 mg) by mouth with the first episode of diarrhea and 1 capsule (2 mg) by mouth with any additional episodes. Maximum 8 capsules (16 mg) per day., Disp: 100 capsule, Rfl: 2    losartan (Cozaar) 50 mg tablet, Take 1 tablet (50 mg) by mouth once daily., Disp: 90 tablet, Rfl: 3    metoprolol succinate XL (Toprol-XL) 50 mg 24 hr tablet, Take 1 tablet (50 mg) by mouth once daily., Disp: 90 tablet, Rfl: 3    nitroglycerin (Nitrostat) 0.4 mg SL tablet, Place 1 tablet (0.4 mg) under the tongue every 5 minutes if needed for chest pain., Disp: 25 tablet, Rfl: 5    ondansetron (Zofran) 8 mg tablet, Take 1 tablet (8 mg) by mouth every 8 hours if needed for nausea or vomiting., Disp: 60 tablet, Rfl: 5    potassium chloride CR (Klor-Con) 10 mEq ER tablet, Take 2 tablets (20 mEq) by mouth once daily. Do not  crush, chew, or split., Disp: 60 tablet, Rfl: 3    prochlorperazine (Compazine) 10 mg tablet, Take 1 tablet (10 mg) by mouth every 6 hours if needed for nausea or vomiting., Disp: 30 tablet, Rfl: 5    sildenafil (Viagra) 50 mg tablet, Take 1 tablet (50 mg) by mouth if needed., Disp: , Rfl:   [5]   Family History  Problem Relation Name Age of Onset    Diabetes Mother      Diabetes Father      Heart attack Father

## 2025-04-22 ENCOUNTER — APPOINTMENT (OUTPATIENT)
Dept: RADIATION ONCOLOGY | Facility: CLINIC | Age: 60
End: 2025-04-22
Payer: COMMERCIAL

## 2025-04-23 ENCOUNTER — LAB (OUTPATIENT)
Dept: LAB | Facility: CLINIC | Age: 60
End: 2025-04-23
Payer: COMMERCIAL

## 2025-04-23 ENCOUNTER — TELEPHONE (OUTPATIENT)
Dept: HEMATOLOGY/ONCOLOGY | Facility: CLINIC | Age: 60
End: 2025-04-23

## 2025-04-23 DIAGNOSIS — C25.9 ADENOCARCINOMA OF PANCREAS (MULTI): ICD-10-CM

## 2025-04-23 LAB
ALBUMIN SERPL BCP-MCNC: 3.7 G/DL (ref 3.4–5)
ALP SERPL-CCNC: 72 U/L (ref 33–136)
ALT SERPL W P-5'-P-CCNC: 43 U/L (ref 10–52)
ANION GAP SERPL CALC-SCNC: 11 MMOL/L (ref 10–20)
AST SERPL W P-5'-P-CCNC: 25 U/L (ref 9–39)
BASOPHILS # BLD AUTO: 0 X10*3/UL (ref 0–0.1)
BASOPHILS NFR BLD AUTO: 0 %
BILIRUB SERPL-MCNC: 0.4 MG/DL (ref 0–1.2)
BUN SERPL-MCNC: 10 MG/DL (ref 6–23)
CALCIUM SERPL-MCNC: 8.7 MG/DL (ref 8.6–10.3)
CHLORIDE SERPL-SCNC: 111 MMOL/L (ref 98–107)
CO2 SERPL-SCNC: 24 MMOL/L (ref 21–32)
CREAT SERPL-MCNC: 0.59 MG/DL (ref 0.5–1.3)
EGFRCR SERPLBLD CKD-EPI 2021: >90 ML/MIN/1.73M*2
EOSINOPHIL # BLD AUTO: 0.02 X10*3/UL (ref 0–0.7)
EOSINOPHIL NFR BLD AUTO: 0.7 %
ERYTHROCYTE [DISTWIDTH] IN BLOOD BY AUTOMATED COUNT: 15.8 % (ref 11.5–14.5)
GLUCOSE SERPL-MCNC: 117 MG/DL (ref 74–99)
HCT VFR BLD AUTO: 32.2 % (ref 41–52)
HGB BLD-MCNC: 10.7 G/DL (ref 13.5–17.5)
IMM GRANULOCYTES # BLD AUTO: 0.01 X10*3/UL (ref 0–0.7)
IMM GRANULOCYTES NFR BLD AUTO: 0.4 % (ref 0–0.9)
LYMPHOCYTES # BLD AUTO: 1.67 X10*3/UL (ref 1.2–4.8)
LYMPHOCYTES NFR BLD AUTO: 59.6 %
MCH RBC QN AUTO: 31 PG (ref 26–34)
MCHC RBC AUTO-ENTMCNC: 33.2 G/DL (ref 32–36)
MCV RBC AUTO: 93 FL (ref 80–100)
MONOCYTES # BLD AUTO: 0.18 X10*3/UL (ref 0.1–1)
MONOCYTES NFR BLD AUTO: 6.4 %
NEUTROPHILS # BLD AUTO: 0.92 X10*3/UL (ref 1.2–7.7)
NEUTROPHILS NFR BLD AUTO: 32.9 %
NRBC BLD-RTO: ABNORMAL /100{WBCS}
PLATELET # BLD AUTO: 160 X10*3/UL (ref 150–450)
POTASSIUM SERPL-SCNC: 3.7 MMOL/L (ref 3.5–5.3)
PROT SERPL-MCNC: 5.8 G/DL (ref 6.4–8.2)
RBC # BLD AUTO: 3.45 X10*6/UL (ref 4.5–5.9)
SODIUM SERPL-SCNC: 142 MMOL/L (ref 136–145)
WBC # BLD AUTO: 2.8 X10*3/UL (ref 4.4–11.3)

## 2025-04-23 PROCEDURE — 85025 COMPLETE CBC W/AUTO DIFF WBC: CPT

## 2025-04-23 PROCEDURE — 80053 COMPREHEN METABOLIC PANEL: CPT

## 2025-04-23 NOTE — TELEPHONE ENCOUNTER
Reason for Conversation  Insurance Precert    Background   Alex called to update chemo precert info for pt. [phone] ext [phone].    Disposition   No disposition on file.

## 2025-04-24 ENCOUNTER — OFFICE VISIT (OUTPATIENT)
Dept: HEMATOLOGY/ONCOLOGY | Facility: CLINIC | Age: 60
End: 2025-04-24
Payer: COMMERCIAL

## 2025-04-24 ENCOUNTER — INFUSION (OUTPATIENT)
Dept: HEMATOLOGY/ONCOLOGY | Facility: CLINIC | Age: 60
End: 2025-04-24
Payer: COMMERCIAL

## 2025-04-24 VITALS
DIASTOLIC BLOOD PRESSURE: 65 MMHG | SYSTOLIC BLOOD PRESSURE: 110 MMHG | BODY MASS INDEX: 27.14 KG/M2 | HEART RATE: 70 BPM | OXYGEN SATURATION: 98 % | TEMPERATURE: 97.3 F | WEIGHT: 184.08 LBS | RESPIRATION RATE: 17 BRPM

## 2025-04-24 DIAGNOSIS — C25.9 ADENOCARCINOMA OF PANCREAS (MULTI): ICD-10-CM

## 2025-04-24 DIAGNOSIS — C25.9 ADENOCARCINOMA OF PANCREAS (MULTI): Primary | ICD-10-CM

## 2025-04-24 PROCEDURE — 2500000004 HC RX 250 GENERAL PHARMACY W/ HCPCS (ALT 636 FOR OP/ED): Mod: JZ | Performed by: INTERNAL MEDICINE

## 2025-04-24 PROCEDURE — 96375 TX/PRO/DX INJ NEW DRUG ADDON: CPT | Mod: INF

## 2025-04-24 PROCEDURE — 96417 CHEMO IV INFUS EACH ADDL SEQ: CPT

## 2025-04-24 PROCEDURE — 99214 OFFICE O/P EST MOD 30 MIN: CPT

## 2025-04-24 PROCEDURE — 96413 CHEMO IV INFUSION 1 HR: CPT

## 2025-04-24 PROCEDURE — 3074F SYST BP LT 130 MM HG: CPT

## 2025-04-24 PROCEDURE — 3078F DIAST BP <80 MM HG: CPT

## 2025-04-24 PROCEDURE — 2500000004 HC RX 250 GENERAL PHARMACY W/ HCPCS (ALT 636 FOR OP/ED): Performed by: INTERNAL MEDICINE

## 2025-04-24 RX ORDER — ALBUTEROL SULFATE 0.83 MG/ML
3 SOLUTION RESPIRATORY (INHALATION) AS NEEDED
Status: DISCONTINUED | OUTPATIENT
Start: 2025-04-24 | End: 2025-04-24 | Stop reason: HOSPADM

## 2025-04-24 RX ORDER — FAMOTIDINE 10 MG/ML
20 INJECTION, SOLUTION INTRAVENOUS ONCE AS NEEDED
Status: DISCONTINUED | OUTPATIENT
Start: 2025-04-24 | End: 2025-04-24 | Stop reason: HOSPADM

## 2025-04-24 RX ORDER — EPINEPHRINE 0.3 MG/.3ML
0.3 INJECTION SUBCUTANEOUS EVERY 5 MIN PRN
Status: DISCONTINUED | OUTPATIENT
Start: 2025-04-24 | End: 2025-04-24 | Stop reason: HOSPADM

## 2025-04-24 RX ORDER — PROCHLORPERAZINE EDISYLATE 5 MG/ML
10 INJECTION INTRAMUSCULAR; INTRAVENOUS EVERY 6 HOURS PRN
Status: DISCONTINUED | OUTPATIENT
Start: 2025-04-24 | End: 2025-04-24 | Stop reason: HOSPADM

## 2025-04-24 RX ORDER — HEPARIN SODIUM,PORCINE/PF 10 UNIT/ML
50 SYRINGE (ML) INTRAVENOUS AS NEEDED
OUTPATIENT
Start: 2025-04-24

## 2025-04-24 RX ORDER — ONDANSETRON HYDROCHLORIDE 2 MG/ML
8 INJECTION, SOLUTION INTRAVENOUS ONCE
Status: COMPLETED | OUTPATIENT
Start: 2025-04-24 | End: 2025-04-24

## 2025-04-24 RX ORDER — DIPHENHYDRAMINE HYDROCHLORIDE 50 MG/ML
50 INJECTION, SOLUTION INTRAMUSCULAR; INTRAVENOUS AS NEEDED
Status: DISCONTINUED | OUTPATIENT
Start: 2025-04-24 | End: 2025-04-24 | Stop reason: HOSPADM

## 2025-04-24 RX ORDER — HEPARIN 100 UNIT/ML
500 SYRINGE INTRAVENOUS AS NEEDED
OUTPATIENT
Start: 2025-04-24

## 2025-04-24 RX ORDER — PROCHLORPERAZINE MALEATE 10 MG
10 TABLET ORAL EVERY 6 HOURS PRN
Status: DISCONTINUED | OUTPATIENT
Start: 2025-04-24 | End: 2025-04-24 | Stop reason: HOSPADM

## 2025-04-24 RX ADMIN — PACLITAXEL 255 MG: 100 INJECTION, POWDER, LYOPHILIZED, FOR SUSPENSION INTRAVENOUS at 15:21

## 2025-04-24 RX ADMIN — GEMCITABINE HYDROCHLORIDE 2021.6 MG: 2 INJECTION, SOLUTION INTRAVENOUS at 15:57

## 2025-04-24 RX ADMIN — ONDANSETRON 8 MG: 2 INJECTION INTRAMUSCULAR; INTRAVENOUS at 14:31

## 2025-04-24 ASSESSMENT — ENCOUNTER SYMPTOMS
FATIGUE: 0
NAUSEA: 0
ABDOMINAL PAIN: 0
APPETITE CHANGE: 0
CONSTIPATION: 0

## 2025-04-24 ASSESSMENT — PAIN SCALES - GENERAL: PAINLEVEL_OUTOF10: 0-NO PAIN

## 2025-04-29 ENCOUNTER — APPOINTMENT (OUTPATIENT)
Dept: HEMATOLOGY/ONCOLOGY | Facility: CLINIC | Age: 60
End: 2025-04-29
Payer: COMMERCIAL

## 2025-04-30 ENCOUNTER — LAB (OUTPATIENT)
Dept: LAB | Facility: CLINIC | Age: 60
End: 2025-04-30
Payer: COMMERCIAL

## 2025-04-30 DIAGNOSIS — C25.9 ADENOCARCINOMA OF PANCREAS (MULTI): ICD-10-CM

## 2025-04-30 LAB
ALBUMIN SERPL BCP-MCNC: 3.9 G/DL (ref 3.4–5)
ALP SERPL-CCNC: 83 U/L (ref 33–136)
ALT SERPL W P-5'-P-CCNC: 52 U/L (ref 10–52)
ANION GAP SERPL CALC-SCNC: 13 MMOL/L (ref 10–20)
AST SERPL W P-5'-P-CCNC: 31 U/L (ref 9–39)
BASOPHILS # BLD AUTO: 0.01 X10*3/UL (ref 0–0.1)
BASOPHILS NFR BLD AUTO: 0.3 %
BILIRUB SERPL-MCNC: 0.5 MG/DL (ref 0–1.2)
BUN SERPL-MCNC: 10 MG/DL (ref 6–23)
CALCIUM SERPL-MCNC: 8.8 MG/DL (ref 8.6–10.3)
CHLORIDE SERPL-SCNC: 107 MMOL/L (ref 98–107)
CO2 SERPL-SCNC: 25 MMOL/L (ref 21–32)
CREAT SERPL-MCNC: 0.62 MG/DL (ref 0.5–1.3)
EGFRCR SERPLBLD CKD-EPI 2021: >90 ML/MIN/1.73M*2
EOSINOPHIL # BLD AUTO: 0.01 X10*3/UL (ref 0–0.7)
EOSINOPHIL NFR BLD AUTO: 0.3 %
ERYTHROCYTE [DISTWIDTH] IN BLOOD BY AUTOMATED COUNT: 16.1 % (ref 11.5–14.5)
GLUCOSE SERPL-MCNC: 121 MG/DL (ref 74–99)
HCT VFR BLD AUTO: 32.4 % (ref 41–52)
HGB BLD-MCNC: 10.5 G/DL (ref 13.5–17.5)
IMM GRANULOCYTES # BLD AUTO: 0.01 X10*3/UL (ref 0–0.7)
IMM GRANULOCYTES NFR BLD AUTO: 0.3 % (ref 0–0.9)
LYMPHOCYTES # BLD AUTO: 1.52 X10*3/UL (ref 1.2–4.8)
LYMPHOCYTES NFR BLD AUTO: 50.8 %
MCH RBC QN AUTO: 30.7 PG (ref 26–34)
MCHC RBC AUTO-ENTMCNC: 32.4 G/DL (ref 32–36)
MCV RBC AUTO: 95 FL (ref 80–100)
MONOCYTES # BLD AUTO: 0.15 X10*3/UL (ref 0.1–1)
MONOCYTES NFR BLD AUTO: 5 %
NEUTROPHILS # BLD AUTO: 1.29 X10*3/UL (ref 1.2–7.7)
NEUTROPHILS NFR BLD AUTO: 43.3 %
PLATELET # BLD AUTO: 121 X10*3/UL (ref 150–450)
POTASSIUM SERPL-SCNC: 3.7 MMOL/L (ref 3.5–5.3)
PROT SERPL-MCNC: 6 G/DL (ref 6.4–8.2)
RBC # BLD AUTO: 3.42 X10*6/UL (ref 4.5–5.9)
SODIUM SERPL-SCNC: 141 MMOL/L (ref 136–145)
WBC # BLD AUTO: 3 X10*3/UL (ref 4.4–11.3)

## 2025-04-30 PROCEDURE — 84075 ASSAY ALKALINE PHOSPHATASE: CPT

## 2025-04-30 PROCEDURE — 85025 COMPLETE CBC W/AUTO DIFF WBC: CPT

## 2025-05-01 ENCOUNTER — NUTRITION (OUTPATIENT)
Dept: HEMATOLOGY/ONCOLOGY | Facility: CLINIC | Age: 60
End: 2025-05-01

## 2025-05-01 ENCOUNTER — APPOINTMENT (OUTPATIENT)
Dept: HEMATOLOGY/ONCOLOGY | Facility: CLINIC | Age: 60
End: 2025-05-01
Payer: COMMERCIAL

## 2025-05-01 ENCOUNTER — INFUSION (OUTPATIENT)
Dept: HEMATOLOGY/ONCOLOGY | Facility: CLINIC | Age: 60
End: 2025-05-01
Payer: COMMERCIAL

## 2025-05-01 VITALS
SYSTOLIC BLOOD PRESSURE: 110 MMHG | TEMPERATURE: 97.5 F | DIASTOLIC BLOOD PRESSURE: 67 MMHG | BODY MASS INDEX: 27.47 KG/M2 | OXYGEN SATURATION: 99 % | HEART RATE: 75 BPM | RESPIRATION RATE: 16 BRPM | WEIGHT: 186.29 LBS

## 2025-05-01 DIAGNOSIS — C25.9 ADENOCARCINOMA OF PANCREAS (MULTI): ICD-10-CM

## 2025-05-01 PROCEDURE — 96375 TX/PRO/DX INJ NEW DRUG ADDON: CPT | Mod: INF

## 2025-05-01 PROCEDURE — 96413 CHEMO IV INFUSION 1 HR: CPT

## 2025-05-01 PROCEDURE — 96417 CHEMO IV INFUS EACH ADDL SEQ: CPT

## 2025-05-01 PROCEDURE — 2500000004 HC RX 250 GENERAL PHARMACY W/ HCPCS (ALT 636 FOR OP/ED): Mod: JZ | Performed by: INTERNAL MEDICINE

## 2025-05-01 RX ORDER — HEPARIN 100 UNIT/ML
500 SYRINGE INTRAVENOUS AS NEEDED
OUTPATIENT
Start: 2025-05-01

## 2025-05-01 RX ORDER — HEPARIN SODIUM,PORCINE/PF 10 UNIT/ML
50 SYRINGE (ML) INTRAVENOUS AS NEEDED
OUTPATIENT
Start: 2025-05-01

## 2025-05-01 RX ORDER — ONDANSETRON HYDROCHLORIDE 2 MG/ML
8 INJECTION, SOLUTION INTRAVENOUS ONCE
Status: COMPLETED | OUTPATIENT
Start: 2025-05-01 | End: 2025-05-01

## 2025-05-01 RX ADMIN — PACLITAXEL 255 MG: 100 INJECTION, POWDER, LYOPHILIZED, FOR SUSPENSION INTRAVENOUS at 09:39

## 2025-05-01 RX ADMIN — GEMCITABINE HYDROCHLORIDE 2021.6 MG: 2 INJECTION, SOLUTION INTRAVENOUS at 10:18

## 2025-05-01 RX ADMIN — ONDANSETRON 8 MG: 2 INJECTION INTRAMUSCULAR; INTRAVENOUS at 09:08

## 2025-05-01 ASSESSMENT — PAIN SCALES - GENERAL: PAINLEVEL_OUTOF10: 0-NO PAIN

## 2025-05-01 NOTE — PROGRESS NOTES
Attempted to meet with patient, but unable to connect today during treatment. Will try again at subsequent infusions.     This service will continue to follow.

## 2025-05-01 NOTE — SIGNIFICANT EVENT
05/01/25 0851   Prechemo Checklist   Has the patient been in the hospital, ED, or urgent care since last date of service No   Chemo/Immuno Consent Completed and Signed Yes  (4/15/25)   Protocol/Indications Verified Yes   Confirmed to previous date/time of medication Yes  (C1D15 today)   Compared to previous dose Yes   All medications are dated accurately Yes   Pregnancy Test Negative Not applicable   Parameters Met Yes  (labs done 4/30/25 - ANC: 1.29, PLTs: 121, bili: 0.5)   BSA/Weight-Height Verified Yes   Dose Calculations Verified (current, total, cumulative) Yes

## 2025-05-08 ENCOUNTER — APPOINTMENT (OUTPATIENT)
Dept: CARDIOLOGY | Facility: CLINIC | Age: 60
End: 2025-05-08
Payer: COMMERCIAL

## 2025-05-08 VITALS
HEART RATE: 80 BPM | SYSTOLIC BLOOD PRESSURE: 100 MMHG | HEIGHT: 69 IN | BODY MASS INDEX: 28.29 KG/M2 | DIASTOLIC BLOOD PRESSURE: 60 MMHG | WEIGHT: 191 LBS

## 2025-05-08 DIAGNOSIS — E78.2 MIXED HYPERLIPIDEMIA: ICD-10-CM

## 2025-05-08 DIAGNOSIS — F17.200 CURRENT EVERY DAY SMOKER: ICD-10-CM

## 2025-05-08 DIAGNOSIS — Z95.5 HISTORY OF CORONARY ARTERY STENT PLACEMENT: ICD-10-CM

## 2025-05-08 DIAGNOSIS — I10 PRIMARY HYPERTENSION: ICD-10-CM

## 2025-05-08 DIAGNOSIS — I25.10 CORONARY ARTERY DISEASE INVOLVING NATIVE CORONARY ARTERY OF NATIVE HEART WITHOUT ANGINA PECTORIS: ICD-10-CM

## 2025-05-08 DIAGNOSIS — I21.4 NSTEMI, INITIAL EPISODE OF CARE (MULTI): ICD-10-CM

## 2025-05-08 PROCEDURE — 3078F DIAST BP <80 MM HG: CPT | Performed by: INTERNAL MEDICINE

## 2025-05-08 PROCEDURE — 3008F BODY MASS INDEX DOCD: CPT | Performed by: INTERNAL MEDICINE

## 2025-05-08 PROCEDURE — 3074F SYST BP LT 130 MM HG: CPT | Performed by: INTERNAL MEDICINE

## 2025-05-08 PROCEDURE — 99214 OFFICE O/P EST MOD 30 MIN: CPT | Performed by: INTERNAL MEDICINE

## 2025-05-08 RX ORDER — NYSTATIN 100000 [USP'U]/ML
SUSPENSION ORAL
COMMUNITY
Start: 2025-04-16

## 2025-05-08 NOTE — PATIENT INSTRUCTIONS
Continue same medications/treatment.  Patient educated on proper medication use.  Patient educated on risk factor modification.  Please bring any lab results from other providers/physicians to your next appointment.    Please bring all medicines, vitamins, and herbal supplements with you when you come to the office.    Prescriptions will not be filled unless you are compliant with your follow up appointments or have a follow up appointment scheduled as per instruction of your physician. Refills should be requested at the time of your visit.    Follow up in 3 months    I, SEDA CHRISTOPHER RN, AM SCRIBING FOR AND IN THE PRESENCE OF DR. SHEA HERNÁNDEZ, DO, FACC

## 2025-05-08 NOTE — PROGRESS NOTES
Patient:  Sathish Iqbal  YOB: 1965  MRN: [id number]     Chief complaint:   Chief Complaint   Patient presents with    Follow-up     Pt. Present today for 3-4 month follow up for CAD management         Chief Complaint/Active Symptoms:       Sathish Iqbal is a 59 y.o. male who returns today for cardiac follow-up.      Objective:     Vitals:    05/08/25 1400   BP: 100/60   Pulse: 80       Vitals:    05/08/25 1400   Weight: 86.6 kg (191 lb)       Allergies:     No Known Allergies       Medications:     Current Outpatient Medications   Medication Instructions    aspirin 81 mg, oral, Daily    atorvastatin (LIPITOR) 40 mg, oral, Nightly    clopidogrel (PLAVIX) 75 mg, oral, Daily    lipase-protease-amylase (Creon) 24,000-76,000 -120,000 unit capsule Take 2 capsules with meals Take 1 capsule with snacks    loperamide (Imodium) 2 mg capsule Take 2 capsules (4 mg) by mouth with the first episode of diarrhea and 1 capsule (2 mg) by mouth with any additional episodes. Maximum 8 capsules (16 mg) per day.    losartan (COZAAR) 50 mg, oral, Daily    metoprolol succinate XL (TOPROL-XL) 50 mg, oral, Daily    nitroglycerin (NITROSTAT) 0.4 mg, sublingual, Every 5 min PRN    nystatin (Mycostatin) 100,000 unit/mL suspension TAKE 5 ML BY MOUTH (500 000 UNITS) BY MOUTH 4 TIMES A DAY. SWISH IN MOUTH AND SWALLOW    ondansetron (ZOFRAN) 8 mg, oral, Every 8 hours PRN    potassium chloride CR (Klor-Con) 10 mEq ER tablet 20 mEq, oral, Daily, Do not crush, chew, or split.    prochlorperazine (COMPAZINE) 10 mg, oral, Every 6 hours PRN    sildenafil (VIAGRA) 50 mg, As needed       Physical Examination:   Constitutional:       Appearance: Healthy appearance. Not in distress.   Neck:      Vascular: No JVR. JVD normal.   Pulmonary:      Effort: Pulmonary effort is normal.      Breath sounds: Normal breath sounds. No wheezing. No rhonchi. No rales.   Chest:      Chest wall: Not tender to palpatation.   Cardiovascular:      PMI at  "left midclavicular line. Normal rate. Regular rhythm. Normal S1. Normal S2.       Murmurs: There is no murmur.      No gallop.  No click. No rub.   Pulses:     Intact distal pulses.   Edema:     Peripheral edema absent.   Abdominal:      General: Bowel sounds are normal.      Palpations: Abdomen is soft.      Tenderness: There is no abdominal tenderness.   Musculoskeletal: Normal range of motion.         General: No tenderness. Skin:     General: Skin is warm and dry.   Neurological:      General: No focal deficit present.      Mental Status: Alert and oriented to person, place and time.            Lab:     CBC:   Lab Results   Component Value Date    WBC 3.0 (L) 04/30/2025    RBC 3.42 (L) 04/30/2025    HGB 10.5 (L) 04/30/2025    HCT 32.4 (L) 04/30/2025     (L) 04/30/2025        CMP:    Lab Results   Component Value Date     04/30/2025    K 3.7 04/30/2025     04/30/2025    CO2 25 04/30/2025    BUN 10 04/30/2025    CREATININE 0.62 04/30/2025    GLUCOSE 121 (H) 04/30/2025    CALCIUM 8.8 04/30/2025       Magnesium:    Lab Results   Component Value Date    MG 2.30 07/17/2023       Lipid Profile:    Lab Results   Component Value Date    TRIG 203 (H) 06/03/2023    TRIG CANCELED 06/03/2023    HDL 39.3 (A) 06/03/2023    HDL CANCELED 06/03/2023       TSH:    Lab Results   Component Value Date    TSH 1.63 04/24/2021       BNP:   Lab Results   Component Value Date     (H) 07/17/2023        PT/INR:    Lab Results   Component Value Date    PROTIME 12.3 01/23/2025    INR 1.1 01/23/2025       HgBA1c:    No results found for: \"HGBA1C\"    BMP:  Lab Results   Component Value Date     04/30/2025     04/23/2025     04/14/2025    K 3.7 04/30/2025    K 3.7 04/23/2025    K 3.7 04/14/2025     04/30/2025     (H) 04/23/2025     (H) 04/14/2025    CO2 25 04/30/2025    CO2 24 04/23/2025    CO2 24 04/14/2025    BUN 10 04/30/2025    BUN 10 04/23/2025    BUN 10 04/14/2025    CREATININE " 0.62 04/30/2025    CREATININE 0.59 04/23/2025    CREATININE 0.72 04/14/2025       CBC:  Lab Results   Component Value Date    WBC 3.0 (L) 04/30/2025    WBC 2.8 (L) 04/23/2025    WBC 5.0 04/14/2025    RBC 3.42 (L) 04/30/2025    RBC 3.45 (L) 04/23/2025    RBC 3.86 (L) 04/14/2025    HGB 10.5 (L) 04/30/2025    HGB 10.7 (L) 04/23/2025    HGB 11.9 (L) 04/14/2025    HCT 32.4 (L) 04/30/2025    HCT 32.2 (L) 04/23/2025    HCT 36.0 (L) 04/14/2025    MCV 95 04/30/2025    MCV 93 04/23/2025    MCV 93 04/14/2025    MCH 30.7 04/30/2025    MCH 31.0 04/23/2025    MCH 30.8 04/14/2025    MCHC 32.4 04/30/2025    MCHC 33.2 04/23/2025    MCHC 33.1 04/14/2025    RDW 16.1 (H) 04/30/2025    RDW 15.8 (H) 04/23/2025    RDW 16.6 (H) 04/14/2025     (L) 04/30/2025     04/23/2025     04/14/2025       Cardiac Enzymes:    Lab Results   Component Value Date    TROPHS 5 01/02/2025    TROPHS CANCELED 07/18/2023    TROPHS 6 07/17/2023       Hepatic Function Panel:    Lab Results   Component Value Date    ALKPHOS 83 04/30/2025    ALT 52 04/30/2025    AST 31 04/30/2025    PROT 6.0 (L) 04/30/2025    BILITOT 0.5 04/30/2025         Diagnostic Studies:     CT-CT CHEST ABDOMEN PELVIS W IV CONTRAST IMPORT  Result Date: 4/17/2025  Images were obtained outside of [facility]    CT chest abdomen pelvis w IV contrast  Result Date: 4/11/2025  Interpreted By:  Schoenberger, Joseph, STUDY: CT CHEST ABDOMEN PELVIS W IV CONTRAST;  4/11/2025 8:50 am   INDICATION: Signs/Symptoms:follow up pancreatic cancer.   ,C25.9 Malignant neoplasm of pancreas, unspecified   COMPARISON: 01/02/2025   ACCESSION NUMBER(S): [id number]   ORDERING CLINICIAN: SATHISH ROMERO   TECHNIQUE: CT of the chest, abdomen, and pelvis was performed.  Contiguous axial images were obtained at 3 mm slice thickness through the chest, abdomen and pelvis. Coronal and sagittal reconstructions at 3 mm slice thickness were performed. 75 ML of Omnipaque 350 was administered  intravenously without immediate complication.   FINDINGS: CHEST:   LUNG/PLEURA/LARGE AIRWAYS: There is an accessory azygos fissure a variant of normal anatomy. The large airways are intact. There is no pleural effusion or pneumothorax or airspace opacity. There is no focal lung opacity that would suggest the presence of metastatic disease.   VESSELS: Implanted venous port catheter right jugular approach tip cavoatrial junction in good position. Moderate atherosclerotic changes of the aorta are identified.  The last branch from the aortic arch is an aberrant right subclavian artery which takes a retroesophageal course. There are moderate coronary atherosclerotic calcifications.   HEART: Normal size.  No pericardial effusion   MEDIASTINUM AND SUMMER: No mediastinal, hilar or axillary lymphadenopathy is present.  The esophagus is normal.   CHEST WALL AND LOWER NECK: The soft tissues of the chest wall demonstrate no gross abnormality. The visualized thyroid gland appears within normal limits.   ABDOMEN:   LIVER: There are multiple liver masses consistent with metastatic lesions. They are new from the prior. Lesions are seen in all segments. None of the vastus or particularly large at this point, the largest measuring 13 mm in the inferior aspect of the right liver lobe.   BILE DUCTS: Normal caliber.   GALLBLADDER: No calcified stones. No wall thickening.   PANCREAS: There is a mass in the inferior aspect of the pancreas head. It appears to be locally aggressive invading the adjacent fat obscuring the fat plane between it and the adjacent duodenum as well as of a short segment superior mesenteric artery. It appears to occlude the superior aspect of the superior mesenteric vein. This is new from the prior. As result there is development of multiple collateral vessels. Additionally there is new onset of mild dilation of the pancreatic duct centrally. The mass has increased in size from prior. A long axis measurement on  today's exam of 3.1 cm corresponds to 2.3 cm previously.   SPLEEN: Within normal limits.   ADRENAL GLANDS: Bilateral adrenal glands appear normal.   KIDNEYS AND URETERS: The kidneys are normal in size and enhance symmetrically.  No hydroureteronephrosis or nephroureterolithiasis is identified.   PELVIS:   BLADDER: Not distended   REPRODUCTIVE ORGANS: Enlarged prostate with central zone calcifications   BOWEL: The stomach is unremarkable.  The small and large bowel are normal in caliber and demonstrate no wall thickening.  Normal appendix.     VESSELS: The abdominal aorta is normal in caliber. There are significant atherosclerotic calcifications. As mentioned above there is occlusion of the superior aspect of the superior mesenteric vein with interval development of multiple collateral vessels.   PERITONEUM/RETROPERITONEUM/LYMPH NODES: There is a small amount of dependent peritoneal free fluid which is new from the prior and may relate to the superior mesenteric vein occlusion. No large volume ascites. No localized fluid collection or free peritoneal air. There is no convincing evidence for emerging abdominal or pelvic lymphadenopathy on this exam.   BONE AND SOFT TISSUE: No suspicious osseous lesions are identified. Degenerative discogenic disease is noted in the lower thoracic and lumbar spine.  The abdominal wall soft tissues appear intact.       CHEST: 1.  No convincing evidence for metastatic lesions. See discussion above.   ABDOMEN-PELVIS: 1.  The increased size of locally aggressive pancreas mass with vascular and involvement resulting in occlusion of the superior aspect superior mesenteric vein, formation of multiple venous collateral vessels, and trace dependent peritoneal free fluid. 2. There are multiple liver masses likely metastatic lesions which are new from the prior exam.     MACRO: Critical Finding:  See findings. Notification was initiated on 4/11/2025 at 1:31 pm by  Joseph Schoenberger.   "(**-YCF-**)   Signed by: Joseph Schoenberger 4/11/2025 1:31 PM Dictation workstation:   [id number]      EKG:   No results found for: \"EKG\"      Radiology:     No orders to display       Problem List:     Patient Active Problem List   Diagnosis    CAD (coronary artery disease)    Current every day smoker    Diastolic dysfunction    Erectile dysfunction    History of coronary artery stent placement    Hyperlipidemia    NSTEMI, initial episode of care (Multi)    Obesity, Class I, BMI 30-34.9    Subsequent non-ST elevation (NSTEMI) myocardial infarction    Vitamin D deficiency    Hypertension    Encounter for annual physical exam    Depression screen    BMI 33.0-33.9,adult    Adenocarcinoma of pancreas (Multi)    Chemotherapy induced nausea and vomiting    Drug-induced constipation         ASSESSMENT       59-year-old gentleman here for routine follow-up.    Meds, vitals, examination as noted    Chart review details constipation his wife.    Impression:  Moderate coronary artery disease  Normal LV function  Former smoker  Pancreatic cancer with ongoing chemotherapy  Dyslipidemia  Diastolic dysfunction  PLAN   Recommendation:  Cardiac status is stable  Continue usual meds  See me in August  If feasible based on patient condition will consider noninvasive testing including stress perfusion echo  Call if any problems or issues otherwise arise  Follow closely with his oncology team        IKathleen RN  am scribing for, and in the presence of Dr. Eliezer Puente DO .    I, Dr. Eliezer Puente DO , personally performed the services described in the documentation as scribed by Kathleen Vasquez RN  in my presence, and confirm it is both accurate and complete.            "

## 2025-05-09 LAB — SCAN RESULT: NORMAL

## 2025-05-13 ENCOUNTER — APPOINTMENT (OUTPATIENT)
Dept: HEMATOLOGY/ONCOLOGY | Facility: CLINIC | Age: 60
End: 2025-05-13
Payer: COMMERCIAL

## 2025-05-13 NOTE — PROGRESS NOTES
Patient ID: Hardy Iqbal is a 59 y.o. male.    Oncology History   Adenocarcinoma of pancreas (Multi)   2/3/2025 Initial Diagnosis    Adenocarcinoma of pancreas (Multi)     2/18/2025 - 4/3/2025 Chemotherapy    mFOLFIRINOX (Fluorouracil Continuous Infusion / Leucovorin / Irinotecan / Oxaliplatin), 14 Day Cycles     4/17/2025 -  Chemotherapy    Albumin-bound PACLitaxel / Gemcitabine (Day 1, 8, and 15), 28 Day Cycles     7/22/2025 - 7/22/2025 Chemotherapy    mFOLFIRINOX (Fluorouracil Continuous Infusion / Leucovorin / Irinotecan / Oxaliplatin), 14 Day Cycles           Subjective    HPI  Mr. Sathish Iqbal is a 59 y.o. male presenting for follow up of pancreatic cancer, here for next dose gem/Abraxane.     Today, he reports the last treatment was tolerable; denies neuropathy, weakness, nausea or vomiting.     Patient's past medical history, surgical history, family history and social history reviewed.    Review of Systems:   Review of Systems:    Positive per HPI, otherwise negative.       Objective    BSA: 2.05 meters squared  /60   Pulse 71   Temp 36.4 °C (97.5 °F)   Resp 18   Wt 86.7 kg (191 lb 2.2 oz)   SpO2 99%   BMI 28.23 kg/m²        Physical Exam  Gen: appears well in clinic, NAD  HEENT: atraumatic head, normocephalic, EOMI, conjunctiva normal  LUNG: no increased WOB, CTAB  CV: No JVD. RRR  GI: soft, NT, ND  LE: no LE edema  Skin: no obvious rashes or lesions on visible skin  Neuro: interactive, no focal deficits noted  Psych: normal mood and affect    Performance Status:  Symptomatic; fully ambulatory    Labs/Imaging/Pathology: Personally reviewed reports and images in Epic electronic medical record system. Pertinent results as it related to the plan represented in below in assessment and plan.     Lab Results   Component Value Date    WBC 6.0 05/15/2025    NEUTROABS 3.26 05/15/2025    IGABSOL 0.01 05/15/2025    LYMPHSABS 1.76 05/15/2025    MONOSABS 0.87 05/15/2025    EOSABS 0.11 05/15/2025     BASOSABS 0.02 05/15/2025    RBC 3.41 (L) 05/15/2025     05/15/2025    MCHC 31.0 (L) 05/15/2025    HGB 10.6 (L) 05/15/2025    HCT 34.2 (L) 05/15/2025     05/15/2025      Lab Results   Component Value Date    CREATININE 0.65 05/15/2025    BUN 10 05/15/2025     05/15/2025    K 4.0 05/15/2025     (H) 05/15/2025    CO2 24 05/15/2025             Assessment/Plan   Invasive adenocarcinoma of pancreas, MSI-S, +KRAS p.G12D, TP53 p.R342  - Initial CA 19-9 less than 4   - Patient was initially presented to the emergency room on 1/2/25 for worsening abdominal pain.   - CT abdomen pelvis at that time revealed a defined mass involving the uncinate process of the pancreas  - biopsy on 1/29/25, which showed invasive adenocarcinoma poorly differentiated. MSI status intact  - CT of the chest on 1/24/25 showed no clear evidence of metastases  - met with Dr. Castro on 1/23/25 and presents to discuss neoadjuvant  chemotherapy today  - patient has a good PS and discussed FOLFIRINOS with 5-FU, oxaliplatin, and irinotecan  - 2/18/25 C1D1  - one variant of UTG1A1 gene and given diarrhea will dose reduce iriniotecan by 25% for C2  - 4/11/25 CT c/a/p with progression of pancreatic mass and new liver lesions    4/14/25:  -Today we reviewed at length recent CT of the chest abdomen pelvis and reviewed images together, which does show increase in size of the pancreatic mass and new liver lesions concerning for metastases  -Reviewed this would not be considered stage IV advanced pancreatic cancer and goal for systemic treatment would be for palliative intent  -Discussed I do want to switch his systemic chemotherapy to gemcitabine plus Abraxane, reviewed side effects and consent signed  - repeat Guardant 360 and initial signatera today  -Prior Tempus did reveal a KRAS mutation and we could consider clinical trial at progression  - will plan to start C1 on 4/17/35  - michelle short term CT after 2 cycles  - Patient would like  to discuss with radiation though they understand that would not be indicated until we can get better control systemically, referral placed to Dr. Celestin    4/24/25:  - Proceed with Gemcitabine + Abraxane C1 D8 today   - Treatment is Day 1, 8, 15; repeated every 28 days.   - CT scan after Cycle 2   - Reschedule XRT consultation  - Ok to proceed today, will monitor neutropenia closely     5/16/25:   - Patient did not have any increased toxicity with first cycle   - He denies neuropathy or GI toxicity   - He does have thrush, Nystatin refilled   - We discussed we will do repeat Signatera today for closer time frame to assess response.   - CT CAP before cycle 3   - No other changes at this time   - Of note, patient did get a second opinion with Dr. Anderson and is aware there are clinical trials at progression   - RTC with me for next cycle, he will see Rolanda in between      Reviewed ongoing medical problems and how they relate to his malignancy, will continue long term monitoring.    RTC with me for next cycle, he will see Rolanda in between  This note has been transcribed using a medical scribe and there is a possibility of unintentional typing misprints    Diagnoses and all orders for this visit:  Adenocarcinoma of pancreas (Multi)  -     Clinic Appointment Request  -     signatera; Other-indicate in comment; unknown - Miscellaneous Test; Future  -     CT chest abdomen pelvis w and wo IV contrast; Future  -     Clinic Appointment Request; Future  -     Infusion Appointment Request; Future  -     CBC and Auto Differential; Future  -     Comprehensive metabolic panel; Future  -     Infusion Appointment Request; Future  -     CBC and Auto Differential; Future  -     Comprehensive metabolic panel; Future  -     Infusion Appointment Request; Future  -     CBC and Auto Differential; Future  -     Comprehensive metabolic panel; Future  -     nystatin (Mycostatin) 100,000 unit/mL suspension; Take 10 mL (1,000,000 Units) by mouth  2 times a day.  Other orders  -     prochlorperazine (Compazine) tablet 10 mg  -     prochlorperazine (Compazine) injection 10 mg  -     ondansetron (Zofran) injection 8 mg  -     prochlorperazine (Compazine) tablet 10 mg  -     prochlorperazine (Compazine) injection 10 mg  -     albumin-bound PACLitaxel 255 mg IV (Abraxane) 65 mL  -     gemcitabine (Gemzar) 2,021.6 mg in sodium chloride 0.9% 303.2 mL IV  -     sodium chloride 0.9 % bolus 500 mL  -     dextrose 5 % in water (D5W) bolus 500 mL  -     diphenhydrAMINE (BENADryl) injection 50 mg  -     methylPREDNISolone sod succinate (SOLU-Medrol) 40 mg/mL injection 40 mg  -     famotidine PF (Pepcid) injection 20 mg  -     EPINEPHrine (Epipen) injection syringe 0.3 mg  -     albuterol 2.5 mg /3 mL (0.083 %) nebulizer solution 3 mL  -     ondansetron (Zofran) injection 8 mg  -     prochlorperazine (Compazine) tablet 10 mg  -     prochlorperazine (Compazine) injection 10 mg  -     albumin-bound PACLitaxel 255 mg IV (Abraxane) 65 mL  -     gemcitabine (Gemzar) 2,021.6 mg in sodium chloride 0.9% 303.2 mL IV  -     sodium chloride 0.9 % bolus 500 mL  -     dextrose 5 % in water (D5W) bolus 500 mL  -     diphenhydrAMINE (BENADryl) injection 50 mg  -     methylPREDNISolone sod succinate (SOLU-Medrol) 40 mg/mL injection 40 mg  -     famotidine PF (Pepcid) injection 20 mg  -     EPINEPHrine (Epipen) injection syringe 0.3 mg  -     albuterol 2.5 mg /3 mL (0.083 %) nebulizer solution 3 mL  -     ondansetron (Zofran) injection 8 mg  -     prochlorperazine (Compazine) tablet 10 mg  -     prochlorperazine (Compazine) injection 10 mg  -     albumin-bound PACLitaxel 255 mg IV (Abraxane) 65 mL  -     gemcitabine (Gemzar) 2,021.6 mg in sodium chloride 0.9% 303.2 mL IV  -     sodium chloride 0.9 % bolus 500 mL  -     dextrose 5 % in water (D5W) bolus 500 mL  -     diphenhydrAMINE (BENADryl) injection 50 mg  -     methylPREDNISolone sod succinate (SOLU-Medrol) 40 mg/mL injection 40 mg  -      famotidine PF (Pepcid) injection 20 mg  -     EPINEPHrine (Epipen) injection syringe 0.3 mg  -     albuterol 2.5 mg /3 mL (0.083 %) nebulizer solution 3 mL  -     ondansetron (Zofran) injection 8 mg  -     prochlorperazine (Compazine) tablet 10 mg  -     prochlorperazine (Compazine) injection 10 mg  -     albumin-bound PACLitaxel 255 mg IV (Abraxane) 65 mL  -     gemcitabine (Gemzar) 2,021.6 mg in sodium chloride 0.9% 303.2 mL IV  -     sodium chloride 0.9 % bolus 500 mL  -     dextrose 5 % in water (D5W) bolus 500 mL  -     diphenhydrAMINE (BENADryl) injection 50 mg  -     methylPREDNISolone sod succinate (SOLU-Medrol) 40 mg/mL injection 40 mg  -     famotidine PF (Pepcid) injection 20 mg  -     EPINEPHrine (Epipen) injection syringe 0.3 mg  -     albuterol 2.5 mg /3 mL (0.083 %) nebulizer solution 3 mL  -     ondansetron (Zofran) injection 8 mg  -     prochlorperazine (Compazine) tablet 10 mg  -     prochlorperazine (Compazine) injection 10 mg  -     albumin-bound PACLitaxel 255 mg IV (Abraxane) 65 mL  -     gemcitabine (Gemzar) 2,021.6 mg in sodium chloride 0.9% 303.2 mL IV  -     sodium chloride 0.9 % bolus 500 mL  -     dextrose 5 % in water (D5W) bolus 500 mL  -     diphenhydrAMINE (BENADryl) injection 50 mg  -     methylPREDNISolone sod succinate (SOLU-Medrol) 40 mg/mL injection 40 mg  -     famotidine PF (Pepcid) injection 20 mg  -     EPINEPHrine (Epipen) injection syringe 0.3 mg  -     albuterol 2.5 mg /3 mL (0.083 %) nebulizer solution 3 mL      Ashley Mesa MD  Hematology/Oncology  [facility]      Scribe Attestation  By signing my name below, Sanaz MONTES Scribe, attest that this documentation has been prepared under the direction and in the presence of Ashley Mesa MD.    Time Spent  Prep time on day of patient encounter: 5 minutes  Time spent directly with patient, family or caregiver: 25 minutes  Additional Time Spent on Patient Care Activities: 5  minutes  Documentation Time: 5 minutes  Other Time Spent: 0 minutes  Total: 40 minutes

## 2025-05-15 ENCOUNTER — APPOINTMENT (OUTPATIENT)
Dept: HEMATOLOGY/ONCOLOGY | Facility: CLINIC | Age: 60
End: 2025-05-15
Payer: COMMERCIAL

## 2025-05-15 ENCOUNTER — LAB (OUTPATIENT)
Dept: LAB | Facility: CLINIC | Age: 60
End: 2025-05-15
Payer: COMMERCIAL

## 2025-05-15 DIAGNOSIS — C25.9 ADENOCARCINOMA OF PANCREAS (MULTI): ICD-10-CM

## 2025-05-15 LAB
ALBUMIN SERPL BCP-MCNC: 3.7 G/DL (ref 3.4–5)
ALP SERPL-CCNC: 136 U/L (ref 33–136)
ALT SERPL W P-5'-P-CCNC: 69 U/L (ref 10–52)
ANION GAP SERPL CALC-SCNC: 10 MMOL/L (ref 10–20)
AST SERPL W P-5'-P-CCNC: 25 U/L (ref 9–39)
BASOPHILS # BLD AUTO: 0.02 X10*3/UL (ref 0–0.1)
BASOPHILS NFR BLD AUTO: 0.3 %
BILIRUB SERPL-MCNC: 0.4 MG/DL (ref 0–1.2)
BUN SERPL-MCNC: 10 MG/DL (ref 6–23)
CALCIUM SERPL-MCNC: 8.6 MG/DL (ref 8.6–10.3)
CHLORIDE SERPL-SCNC: 110 MMOL/L (ref 98–107)
CO2 SERPL-SCNC: 24 MMOL/L (ref 21–32)
CREAT SERPL-MCNC: 0.65 MG/DL (ref 0.5–1.3)
EGFRCR SERPLBLD CKD-EPI 2021: >90 ML/MIN/1.73M*2
EOSINOPHIL # BLD AUTO: 0.11 X10*3/UL (ref 0–0.7)
EOSINOPHIL NFR BLD AUTO: 1.8 %
ERYTHROCYTE [DISTWIDTH] IN BLOOD BY AUTOMATED COUNT: 17.3 % (ref 11.5–14.5)
GLUCOSE SERPL-MCNC: 102 MG/DL (ref 74–99)
HCT VFR BLD AUTO: 34.2 % (ref 41–52)
HGB BLD-MCNC: 10.6 G/DL (ref 13.5–17.5)
IMM GRANULOCYTES # BLD AUTO: 0.01 X10*3/UL (ref 0–0.7)
IMM GRANULOCYTES NFR BLD AUTO: 0.2 % (ref 0–0.9)
LYMPHOCYTES # BLD AUTO: 1.76 X10*3/UL (ref 1.2–4.8)
LYMPHOCYTES NFR BLD AUTO: 29.2 %
MCH RBC QN AUTO: 31.1 PG (ref 26–34)
MCHC RBC AUTO-ENTMCNC: 31 G/DL (ref 32–36)
MCV RBC AUTO: 100 FL (ref 80–100)
MONOCYTES # BLD AUTO: 0.87 X10*3/UL (ref 0.1–1)
MONOCYTES NFR BLD AUTO: 14.4 %
NEUTROPHILS # BLD AUTO: 3.26 X10*3/UL (ref 1.2–7.7)
NEUTROPHILS NFR BLD AUTO: 54.1 %
PLATELET # BLD AUTO: 281 X10*3/UL (ref 150–450)
POTASSIUM SERPL-SCNC: 4 MMOL/L (ref 3.5–5.3)
PROT SERPL-MCNC: 5.8 G/DL (ref 6.4–8.2)
RBC # BLD AUTO: 3.41 X10*6/UL (ref 4.5–5.9)
SODIUM SERPL-SCNC: 140 MMOL/L (ref 136–145)
WBC # BLD AUTO: 6 X10*3/UL (ref 4.4–11.3)

## 2025-05-15 PROCEDURE — 85025 COMPLETE CBC W/AUTO DIFF WBC: CPT

## 2025-05-15 PROCEDURE — 80053 COMPREHEN METABOLIC PANEL: CPT

## 2025-05-16 ENCOUNTER — OFFICE VISIT (OUTPATIENT)
Dept: HEMATOLOGY/ONCOLOGY | Facility: CLINIC | Age: 60
End: 2025-05-16
Payer: COMMERCIAL

## 2025-05-16 ENCOUNTER — INFUSION (OUTPATIENT)
Dept: HEMATOLOGY/ONCOLOGY | Facility: CLINIC | Age: 60
End: 2025-05-16
Payer: COMMERCIAL

## 2025-05-16 VITALS
DIASTOLIC BLOOD PRESSURE: 60 MMHG | TEMPERATURE: 97.5 F | HEART RATE: 71 BPM | OXYGEN SATURATION: 99 % | WEIGHT: 191.14 LBS | BODY MASS INDEX: 28.23 KG/M2 | SYSTOLIC BLOOD PRESSURE: 108 MMHG | RESPIRATION RATE: 18 BRPM

## 2025-05-16 VITALS — HEIGHT: 69 IN | BODY MASS INDEX: 28.18 KG/M2

## 2025-05-16 DIAGNOSIS — C25.9 ADENOCARCINOMA OF PANCREAS (MULTI): ICD-10-CM

## 2025-05-16 DIAGNOSIS — C25.9 ADENOCARCINOMA OF PANCREAS (MULTI): Primary | ICD-10-CM

## 2025-05-16 PROCEDURE — 96417 CHEMO IV INFUS EACH ADDL SEQ: CPT

## 2025-05-16 PROCEDURE — 96375 TX/PRO/DX INJ NEW DRUG ADDON: CPT | Mod: INF

## 2025-05-16 PROCEDURE — 2500000004 HC RX 250 GENERAL PHARMACY W/ HCPCS (ALT 636 FOR OP/ED): Performed by: INTERNAL MEDICINE

## 2025-05-16 PROCEDURE — 2500000004 HC RX 250 GENERAL PHARMACY W/ HCPCS (ALT 636 FOR OP/ED): Mod: JZ | Performed by: INTERNAL MEDICINE

## 2025-05-16 PROCEDURE — 99215 OFFICE O/P EST HI 40 MIN: CPT | Performed by: INTERNAL MEDICINE

## 2025-05-16 PROCEDURE — 96413 CHEMO IV INFUSION 1 HR: CPT

## 2025-05-16 PROCEDURE — 3078F DIAST BP <80 MM HG: CPT | Performed by: INTERNAL MEDICINE

## 2025-05-16 PROCEDURE — 3074F SYST BP LT 130 MM HG: CPT | Performed by: INTERNAL MEDICINE

## 2025-05-16 PROCEDURE — 99215 OFFICE O/P EST HI 40 MIN: CPT | Mod: 25 | Performed by: INTERNAL MEDICINE

## 2025-05-16 RX ORDER — DIPHENHYDRAMINE HYDROCHLORIDE 50 MG/ML
50 INJECTION, SOLUTION INTRAMUSCULAR; INTRAVENOUS AS NEEDED
Status: CANCELLED | OUTPATIENT
Start: 2025-05-16

## 2025-05-16 RX ORDER — ALBUTEROL SULFATE 0.83 MG/ML
3 SOLUTION RESPIRATORY (INHALATION) AS NEEDED
OUTPATIENT
Start: 2025-06-12

## 2025-05-16 RX ORDER — PROCHLORPERAZINE EDISYLATE 5 MG/ML
10 INJECTION INTRAMUSCULAR; INTRAVENOUS EVERY 6 HOURS PRN
OUTPATIENT
Start: 2025-06-19

## 2025-05-16 RX ORDER — PROCHLORPERAZINE EDISYLATE 5 MG/ML
10 INJECTION INTRAMUSCULAR; INTRAVENOUS EVERY 6 HOURS PRN
OUTPATIENT
Start: 2025-05-29

## 2025-05-16 RX ORDER — PROCHLORPERAZINE EDISYLATE 5 MG/ML
10 INJECTION INTRAMUSCULAR; INTRAVENOUS EVERY 6 HOURS PRN
Status: CANCELLED | OUTPATIENT
Start: 2025-05-16

## 2025-05-16 RX ORDER — ONDANSETRON HYDROCHLORIDE 2 MG/ML
8 INJECTION, SOLUTION INTRAVENOUS ONCE
OUTPATIENT
Start: 2025-05-22

## 2025-05-16 RX ORDER — FAMOTIDINE 10 MG/ML
20 INJECTION, SOLUTION INTRAVENOUS ONCE AS NEEDED
OUTPATIENT
Start: 2025-06-26

## 2025-05-16 RX ORDER — FAMOTIDINE 10 MG/ML
20 INJECTION, SOLUTION INTRAVENOUS ONCE AS NEEDED
Status: CANCELLED | OUTPATIENT
Start: 2025-05-16

## 2025-05-16 RX ORDER — PROCHLORPERAZINE MALEATE 10 MG
10 TABLET ORAL EVERY 6 HOURS PRN
OUTPATIENT
Start: 2025-06-19

## 2025-05-16 RX ORDER — PROCHLORPERAZINE MALEATE 10 MG
10 TABLET ORAL EVERY 6 HOURS PRN
OUTPATIENT
Start: 2025-05-29

## 2025-05-16 RX ORDER — DIPHENHYDRAMINE HYDROCHLORIDE 50 MG/ML
50 INJECTION, SOLUTION INTRAMUSCULAR; INTRAVENOUS AS NEEDED
Status: DISCONTINUED | OUTPATIENT
Start: 2025-05-16 | End: 2025-05-16 | Stop reason: HOSPADM

## 2025-05-16 RX ORDER — NYSTATIN 100000 [USP'U]/ML
10 SUSPENSION ORAL 2 TIMES DAILY
Qty: 473 ML | Refills: 11 | Status: SHIPPED | OUTPATIENT
Start: 2025-05-16

## 2025-05-16 RX ORDER — DIPHENHYDRAMINE HYDROCHLORIDE 50 MG/ML
50 INJECTION, SOLUTION INTRAMUSCULAR; INTRAVENOUS AS NEEDED
OUTPATIENT
Start: 2025-06-19

## 2025-05-16 RX ORDER — ONDANSETRON HYDROCHLORIDE 2 MG/ML
8 INJECTION, SOLUTION INTRAVENOUS ONCE
Status: CANCELLED | OUTPATIENT
Start: 2025-05-16

## 2025-05-16 RX ORDER — ONDANSETRON HYDROCHLORIDE 2 MG/ML
8 INJECTION, SOLUTION INTRAVENOUS ONCE
OUTPATIENT
Start: 2025-06-19

## 2025-05-16 RX ORDER — ALBUTEROL SULFATE 0.83 MG/ML
3 SOLUTION RESPIRATORY (INHALATION) AS NEEDED
OUTPATIENT
Start: 2025-06-19

## 2025-05-16 RX ORDER — ONDANSETRON HYDROCHLORIDE 2 MG/ML
8 INJECTION, SOLUTION INTRAVENOUS ONCE
OUTPATIENT
Start: 2025-06-26

## 2025-05-16 RX ORDER — EPINEPHRINE 0.3 MG/.3ML
0.3 INJECTION SUBCUTANEOUS EVERY 5 MIN PRN
OUTPATIENT
Start: 2025-05-22

## 2025-05-16 RX ORDER — FAMOTIDINE 10 MG/ML
20 INJECTION, SOLUTION INTRAVENOUS ONCE AS NEEDED
Status: DISCONTINUED | OUTPATIENT
Start: 2025-05-16 | End: 2025-05-16 | Stop reason: HOSPADM

## 2025-05-16 RX ORDER — ONDANSETRON HYDROCHLORIDE 2 MG/ML
8 INJECTION, SOLUTION INTRAVENOUS ONCE
OUTPATIENT
Start: 2025-06-12

## 2025-05-16 RX ORDER — EPINEPHRINE 0.3 MG/.3ML
0.3 INJECTION SUBCUTANEOUS EVERY 5 MIN PRN
Status: CANCELLED | OUTPATIENT
Start: 2025-05-16

## 2025-05-16 RX ORDER — ALBUTEROL SULFATE 0.83 MG/ML
3 SOLUTION RESPIRATORY (INHALATION) AS NEEDED
OUTPATIENT
Start: 2025-05-29

## 2025-05-16 RX ORDER — PROCHLORPERAZINE EDISYLATE 5 MG/ML
10 INJECTION INTRAMUSCULAR; INTRAVENOUS EVERY 6 HOURS PRN
OUTPATIENT
Start: 2025-06-12

## 2025-05-16 RX ORDER — EPINEPHRINE 0.3 MG/.3ML
0.3 INJECTION SUBCUTANEOUS EVERY 5 MIN PRN
OUTPATIENT
Start: 2025-06-12

## 2025-05-16 RX ORDER — HEPARIN 100 UNIT/ML
500 SYRINGE INTRAVENOUS AS NEEDED
OUTPATIENT
Start: 2025-05-16

## 2025-05-16 RX ORDER — EPINEPHRINE 0.3 MG/.3ML
0.3 INJECTION SUBCUTANEOUS EVERY 5 MIN PRN
OUTPATIENT
Start: 2025-06-26

## 2025-05-16 RX ORDER — DIPHENHYDRAMINE HYDROCHLORIDE 50 MG/ML
50 INJECTION, SOLUTION INTRAMUSCULAR; INTRAVENOUS AS NEEDED
OUTPATIENT
Start: 2025-05-22

## 2025-05-16 RX ORDER — DIPHENHYDRAMINE HYDROCHLORIDE 50 MG/ML
50 INJECTION, SOLUTION INTRAMUSCULAR; INTRAVENOUS AS NEEDED
OUTPATIENT
Start: 2025-06-26

## 2025-05-16 RX ORDER — FAMOTIDINE 10 MG/ML
20 INJECTION, SOLUTION INTRAVENOUS ONCE AS NEEDED
OUTPATIENT
Start: 2025-06-19

## 2025-05-16 RX ORDER — FAMOTIDINE 10 MG/ML
20 INJECTION, SOLUTION INTRAVENOUS ONCE AS NEEDED
OUTPATIENT
Start: 2025-05-22

## 2025-05-16 RX ORDER — FAMOTIDINE 10 MG/ML
20 INJECTION, SOLUTION INTRAVENOUS ONCE AS NEEDED
OUTPATIENT
Start: 2025-06-12

## 2025-05-16 RX ORDER — PROCHLORPERAZINE MALEATE 10 MG
10 TABLET ORAL EVERY 6 HOURS PRN
OUTPATIENT
Start: 2025-06-12

## 2025-05-16 RX ORDER — HEPARIN SODIUM,PORCINE/PF 10 UNIT/ML
50 SYRINGE (ML) INTRAVENOUS AS NEEDED
OUTPATIENT
Start: 2025-05-16

## 2025-05-16 RX ORDER — FAMOTIDINE 10 MG/ML
20 INJECTION, SOLUTION INTRAVENOUS ONCE AS NEEDED
OUTPATIENT
Start: 2025-05-29

## 2025-05-16 RX ORDER — EPINEPHRINE 0.3 MG/.3ML
0.3 INJECTION SUBCUTANEOUS EVERY 5 MIN PRN
Status: DISCONTINUED | OUTPATIENT
Start: 2025-05-16 | End: 2025-05-16 | Stop reason: HOSPADM

## 2025-05-16 RX ORDER — PROCHLORPERAZINE EDISYLATE 5 MG/ML
10 INJECTION INTRAMUSCULAR; INTRAVENOUS EVERY 6 HOURS PRN
OUTPATIENT
Start: 2025-05-22

## 2025-05-16 RX ORDER — ALBUTEROL SULFATE 0.83 MG/ML
3 SOLUTION RESPIRATORY (INHALATION) AS NEEDED
OUTPATIENT
Start: 2025-05-22

## 2025-05-16 RX ORDER — EPINEPHRINE 0.3 MG/.3ML
0.3 INJECTION SUBCUTANEOUS EVERY 5 MIN PRN
OUTPATIENT
Start: 2025-06-19

## 2025-05-16 RX ORDER — ONDANSETRON HYDROCHLORIDE 2 MG/ML
8 INJECTION, SOLUTION INTRAVENOUS ONCE
Status: COMPLETED | OUTPATIENT
Start: 2025-05-16 | End: 2025-05-16

## 2025-05-16 RX ORDER — EPINEPHRINE 0.3 MG/.3ML
0.3 INJECTION SUBCUTANEOUS EVERY 5 MIN PRN
OUTPATIENT
Start: 2025-05-29

## 2025-05-16 RX ORDER — PROCHLORPERAZINE MALEATE 10 MG
10 TABLET ORAL EVERY 6 HOURS PRN
OUTPATIENT
Start: 2025-06-26

## 2025-05-16 RX ORDER — ALBUTEROL SULFATE 0.83 MG/ML
3 SOLUTION RESPIRATORY (INHALATION) AS NEEDED
Status: DISCONTINUED | OUTPATIENT
Start: 2025-05-16 | End: 2025-05-16 | Stop reason: HOSPADM

## 2025-05-16 RX ORDER — PROCHLORPERAZINE EDISYLATE 5 MG/ML
10 INJECTION INTRAMUSCULAR; INTRAVENOUS EVERY 6 HOURS PRN
OUTPATIENT
Start: 2025-06-26

## 2025-05-16 RX ORDER — PROCHLORPERAZINE MALEATE 10 MG
10 TABLET ORAL EVERY 6 HOURS PRN
Status: CANCELLED | OUTPATIENT
Start: 2025-05-16

## 2025-05-16 RX ORDER — ONDANSETRON HYDROCHLORIDE 2 MG/ML
8 INJECTION, SOLUTION INTRAVENOUS ONCE
OUTPATIENT
Start: 2025-05-29

## 2025-05-16 RX ORDER — DIPHENHYDRAMINE HYDROCHLORIDE 50 MG/ML
50 INJECTION, SOLUTION INTRAMUSCULAR; INTRAVENOUS AS NEEDED
OUTPATIENT
Start: 2025-06-12

## 2025-05-16 RX ORDER — ALBUTEROL SULFATE 0.83 MG/ML
3 SOLUTION RESPIRATORY (INHALATION) AS NEEDED
OUTPATIENT
Start: 2025-06-26

## 2025-05-16 RX ORDER — DIPHENHYDRAMINE HYDROCHLORIDE 50 MG/ML
50 INJECTION, SOLUTION INTRAMUSCULAR; INTRAVENOUS AS NEEDED
OUTPATIENT
Start: 2025-05-29

## 2025-05-16 RX ORDER — ALBUTEROL SULFATE 0.83 MG/ML
3 SOLUTION RESPIRATORY (INHALATION) AS NEEDED
Status: CANCELLED | OUTPATIENT
Start: 2025-05-16

## 2025-05-16 RX ORDER — PROCHLORPERAZINE MALEATE 10 MG
10 TABLET ORAL EVERY 6 HOURS PRN
OUTPATIENT
Start: 2025-05-22

## 2025-05-16 RX ADMIN — PACLITAXEL 255 MG: 100 INJECTION, POWDER, LYOPHILIZED, FOR SUSPENSION INTRAVENOUS at 10:32

## 2025-05-16 RX ADMIN — GEMCITABINE HYDROCHLORIDE 2021.6 MG: 2 INJECTION, SOLUTION INTRAVENOUS at 11:18

## 2025-05-16 RX ADMIN — ONDANSETRON 8 MG: 2 INJECTION INTRAMUSCULAR; INTRAVENOUS at 10:03

## 2025-05-16 ASSESSMENT — PAIN SCALES - GENERAL: PAINLEVEL_OUTOF10: 0-NO PAIN

## 2025-05-20 LAB
SCAN RESULT: NORMAL
SCAN RESULT: NORMAL

## 2025-05-22 ENCOUNTER — LAB (OUTPATIENT)
Dept: LAB | Facility: CLINIC | Age: 60
End: 2025-05-22
Payer: COMMERCIAL

## 2025-05-22 ENCOUNTER — APPOINTMENT (OUTPATIENT)
Dept: HEMATOLOGY/ONCOLOGY | Facility: CLINIC | Age: 60
End: 2025-05-22
Payer: COMMERCIAL

## 2025-05-22 DIAGNOSIS — C25.9 ADENOCARCINOMA OF PANCREAS (MULTI): ICD-10-CM

## 2025-05-22 LAB
ALBUMIN SERPL BCP-MCNC: 3.6 G/DL (ref 3.4–5)
ALP SERPL-CCNC: 90 U/L (ref 33–136)
ALT SERPL W P-5'-P-CCNC: 45 U/L (ref 10–52)
ANION GAP SERPL CALC-SCNC: 12 MMOL/L (ref 10–20)
AST SERPL W P-5'-P-CCNC: 32 U/L (ref 9–39)
BASOPHILS # BLD AUTO: 0.03 X10*3/UL (ref 0–0.1)
BASOPHILS NFR BLD AUTO: 0.7 %
BILIRUB SERPL-MCNC: 0.5 MG/DL (ref 0–1.2)
BUN SERPL-MCNC: 7 MG/DL (ref 6–23)
CALCIUM SERPL-MCNC: 8.8 MG/DL (ref 8.6–10.3)
CHLORIDE SERPL-SCNC: 106 MMOL/L (ref 98–107)
CO2 SERPL-SCNC: 24 MMOL/L (ref 21–32)
CREAT SERPL-MCNC: 0.67 MG/DL (ref 0.5–1.3)
EGFRCR SERPLBLD CKD-EPI 2021: >90 ML/MIN/1.73M*2
EOSINOPHIL # BLD AUTO: 0.08 X10*3/UL (ref 0–0.7)
EOSINOPHIL NFR BLD AUTO: 1.8 %
ERYTHROCYTE [DISTWIDTH] IN BLOOD BY AUTOMATED COUNT: 15.8 % (ref 11.5–14.5)
GLUCOSE SERPL-MCNC: 106 MG/DL (ref 74–99)
HCT VFR BLD AUTO: 33.7 % (ref 41–52)
HGB BLD-MCNC: 10.6 G/DL (ref 13.5–17.5)
IMM GRANULOCYTES # BLD AUTO: 0.03 X10*3/UL (ref 0–0.7)
IMM GRANULOCYTES NFR BLD AUTO: 0.7 % (ref 0–0.9)
LYMPHOCYTES # BLD AUTO: 1.98 X10*3/UL (ref 1.2–4.8)
LYMPHOCYTES NFR BLD AUTO: 45.2 %
MCH RBC QN AUTO: 31.4 PG (ref 26–34)
MCHC RBC AUTO-ENTMCNC: 31.5 G/DL (ref 32–36)
MCV RBC AUTO: 100 FL (ref 80–100)
MONOCYTES # BLD AUTO: 0.4 X10*3/UL (ref 0.1–1)
MONOCYTES NFR BLD AUTO: 9.1 %
NEUTROPHILS # BLD AUTO: 1.86 X10*3/UL (ref 1.2–7.7)
NEUTROPHILS NFR BLD AUTO: 42.5 %
PLATELET # BLD AUTO: 268 X10*3/UL (ref 150–450)
POTASSIUM SERPL-SCNC: 4 MMOL/L (ref 3.5–5.3)
PROT SERPL-MCNC: 6 G/DL (ref 6.4–8.2)
RBC # BLD AUTO: 3.38 X10*6/UL (ref 4.5–5.9)
SODIUM SERPL-SCNC: 138 MMOL/L (ref 136–145)
WBC # BLD AUTO: 4.4 X10*3/UL (ref 4.4–11.3)

## 2025-05-22 PROCEDURE — 85025 COMPLETE CBC W/AUTO DIFF WBC: CPT

## 2025-05-22 PROCEDURE — 80053 COMPREHEN METABOLIC PANEL: CPT

## 2025-05-23 ENCOUNTER — INFUSION (OUTPATIENT)
Dept: HEMATOLOGY/ONCOLOGY | Facility: CLINIC | Age: 60
End: 2025-05-23
Payer: COMMERCIAL

## 2025-05-23 VITALS
SYSTOLIC BLOOD PRESSURE: 110 MMHG | HEART RATE: 70 BPM | OXYGEN SATURATION: 98 % | WEIGHT: 193.12 LBS | BODY MASS INDEX: 28.47 KG/M2 | RESPIRATION RATE: 16 BRPM | TEMPERATURE: 97.2 F | DIASTOLIC BLOOD PRESSURE: 64 MMHG

## 2025-05-23 DIAGNOSIS — C25.9 ADENOCARCINOMA OF PANCREAS (MULTI): ICD-10-CM

## 2025-05-23 PROCEDURE — 2500000004 HC RX 250 GENERAL PHARMACY W/ HCPCS (ALT 636 FOR OP/ED): Mod: JZ | Performed by: INTERNAL MEDICINE

## 2025-05-23 PROCEDURE — 96413 CHEMO IV INFUSION 1 HR: CPT

## 2025-05-23 PROCEDURE — 96417 CHEMO IV INFUS EACH ADDL SEQ: CPT

## 2025-05-23 PROCEDURE — 96375 TX/PRO/DX INJ NEW DRUG ADDON: CPT | Mod: INF

## 2025-05-23 RX ORDER — ONDANSETRON HYDROCHLORIDE 2 MG/ML
8 INJECTION, SOLUTION INTRAVENOUS ONCE
Status: COMPLETED | OUTPATIENT
Start: 2025-05-23 | End: 2025-05-23

## 2025-05-23 RX ORDER — HEPARIN SODIUM,PORCINE/PF 10 UNIT/ML
50 SYRINGE (ML) INTRAVENOUS AS NEEDED
OUTPATIENT
Start: 2025-05-23

## 2025-05-23 RX ORDER — HEPARIN 100 UNIT/ML
500 SYRINGE INTRAVENOUS AS NEEDED
OUTPATIENT
Start: 2025-05-23

## 2025-05-23 RX ADMIN — GEMCITABINE HYDROCHLORIDE 2021.6 MG: 2 INJECTION, SOLUTION INTRAVENOUS at 13:11

## 2025-05-23 RX ADMIN — ONDANSETRON 8 MG: 2 INJECTION INTRAMUSCULAR; INTRAVENOUS at 11:59

## 2025-05-23 RX ADMIN — PACLITAXEL 255 MG: 100 INJECTION, POWDER, LYOPHILIZED, FOR SUSPENSION INTRAVENOUS at 12:20

## 2025-05-23 ASSESSMENT — ENCOUNTER SYMPTOMS
FEVER: 0
ENDOCRINE NEGATIVE: 1
CHILLS: 0
RESPIRATORY NEGATIVE: 1
PSYCHIATRIC NEGATIVE: 1
APPETITE CHANGE: 0
ABDOMINAL PAIN: 0
CONSTIPATION: 0
NAUSEA: 0
HEMATOLOGIC/LYMPHATIC NEGATIVE: 1
VOMITING: 0
NEUROLOGICAL NEGATIVE: 1
FATIGUE: 0
UNEXPECTED WEIGHT CHANGE: 0
MUSCULOSKELETAL NEGATIVE: 1
DIARRHEA: 0
EYES NEGATIVE: 1

## 2025-05-23 ASSESSMENT — PAIN SCALES - GENERAL: PAINLEVEL_OUTOF10: 0-NO PAIN

## 2025-05-23 NOTE — PROGRESS NOTES
"  Patient ID: Sathish Iqbal \"Hardy\" is a 59 y.o. male.  Diagnosis:  Pancreatic Cancer  MedOnc: Dr. Mesa  SurgOnc: Sr. Castro   Primary Care Provider: Tamy Pascual MD  Referring Provider: No referring provider defined for this encounter.  Visit Type: Follow Up    Date of Service: 05/30/25  Location: [facility]     Patient Care Team:  Tamy Pascual MD as PCP - General  Tamy Pascual MD as PCP - [facility] PCP  Eliezer Puente DO as Cardiologist (Cardiology)  Ashley Mesa MD as Consulting Physician (Hematology and Oncology)  LILLIAN Lambert as  (Oncology)    Current Therapy: mFOLFIRINOX     ONCOLOGIC HISTORY     No matching staging information was found for the patient.    Invasive adenocarcinoma of pancreas, MSI-S  Initial CA 19-9 less than 4   1/2/2025: ED for abd pain. CT abdomen pelvis showed pancreatic mass   1/29/2025: biopsy - adenocarcinoma poorly differentiated. MSI status intact  1/23/2025: Saw Dr. Castro   1/24/2025: CT of the chest - no evidence of metastases  2/12/2025: Port placement  2/15/2025: [facility] somatic testing - No reportable pathogenic variants were found  2/18/2025: Cycle 1 FOLFIRINOX   3/4/2025: has a copy of UTG1A1 gene, d/t diarrhea; irinotecan reduced by 25%, cycle 2   3/18/2025: Cycle 3 FOLFIRINOX   4/1/2025: Cycle 4 FOLFIRINOX   4/11/2025: CT CAP increase in size of the pancreatic mass and new liver lesions concerning for metastases; now be considered stage IV advanced pancreatic cancer and goal for systemic treatment would be for palliative intent  - switch systemic chemotherapy to gemcitabine plus Abraxane  - repeat Guardant 360 and initial signatera   - Prior Tempus did reveal a KRAS mutation and we could consider clinical trial at progression  4/17/2025: C1 D1 [place] Abraxane   4/24/2025: C1 D8 [place] Abraxane   5/16/2025: Cycle 2 [place] Abraxane     Oncology History   Adenocarcinoma of pancreas (Multi)   2/3/2025 Initial Diagnosis    Adenocarcinoma of " "pancreas (Multi)     2/18/2025 - 4/3/2025 Chemotherapy    mFOLFIRINOX (Fluorouracil Continuous Infusion / Leucovorin / Irinotecan / Oxaliplatin), 14 Day Cycles     4/17/2025 -  Chemotherapy    Albumin-bound PACLitaxel / Gemcitabine (Day 1, 8, and 15), 28 Day Cycles     7/22/2025 - 7/22/2025 Chemotherapy    mFOLFIRINOX (Fluorouracil Continuous Infusion / Leucovorin / Irinotecan / Oxaliplatin), 14 Day Cycles        Other Contributing History  NSTEMI, CAD, stents, HLD, HTN    Subjective      INTERVAL HISTORY     Sathish Iqbal \"Hardy\" is a 59 y.o. male who presents today for follow up of pancreatic adenocarcinoma. Patient of Dr. Mesa currently on gem/abraxane. Here today for C2 Day 15. He feels good. Denies pain. No GI issues. No signs of infection. Tolerating treatment well with no new side effects. Energy is good. Continues to work. Eating well. He has left lower extremity edema, no pain or redness. He states this has been happening intermittently for a few weeks.     Review of Systems   Constitutional:  Negative for appetite change, chills, fatigue, fever and unexpected weight change.   HENT:  Negative.     Eyes: Negative.    Respiratory: Negative.     Cardiovascular:  Positive for leg swelling.   Gastrointestinal:  Negative for abdominal pain, constipation, diarrhea, nausea and vomiting.   Endocrine: Negative.    Genitourinary: Negative.     Musculoskeletal: Negative.    Skin: Negative.    Neurological: Negative.    Hematological: Negative.    Psychiatric/Behavioral: Negative.         Objective      /62   Pulse 71   Temp 36.3 °C (97.3 °F) (Temporal)   Resp 16   Wt 88.5 kg (195 lb 1.7 oz)   SpO2 98%   BMI 28.77 kg/m²   BSA: 2.08 meters squared    Wt Readings from Last 5 Encounters:   05/30/25 88.5 kg (195 lb 1.7 oz)   05/23/25 87.6 kg (193 lb 2 oz)   05/16/25 86.7 kg (191 lb 2.2 oz)   05/08/25 86.6 kg (191 lb)   05/01/25 84.5 kg (186 lb 4.6 oz)     Performance Status:  ECOG Score: 0- Fully active, able " to carry on all pre-disease performance w/o restriction.  Karnofsky Score: 90 - Able to carry on normal activity; minor signs or symptoms of disease     PHYSICAL EXAM   Physical Exam  Constitutional:       General: He is not in acute distress.     Appearance: Normal appearance. He is not toxic-appearing.   HENT:      Head: Normocephalic and atraumatic.   Eyes:      Pupils: Pupils are equal, round, and reactive to light.   Pulmonary:      Effort: Pulmonary effort is normal.   Musculoskeletal:         General: Normal range of motion.      Cervical back: Normal range of motion.      Right lower leg: No edema.      Left lower leg: Edema present.   Neurological:      General: No focal deficit present.      Mental Status: He is alert and oriented to person, place, and time.      Motor: No weakness.   Psychiatric:         Mood and Affect: Mood normal.         Behavior: Behavior normal.         Thought Content: Thought content normal.         Judgment: Judgment normal.       Allergies  No Known Allergies   Medications  Current Outpatient Medications   Medication Instructions    aspirin 81 mg, oral, Daily    atorvastatin (LIPITOR) 40 mg, oral, Nightly    clopidogrel (PLAVIX) 75 mg, oral, Daily    lipase-protease-amylase (Creon) 24,000-76,000 -120,000 unit capsule Take 2 capsules with meals Take 1 capsule with snacks    loperamide (Imodium) 2 mg capsule Take 2 capsules (4 mg) by mouth with the first episode of diarrhea and 1 capsule (2 mg) by mouth with any additional episodes. Maximum 8 capsules (16 mg) per day.    losartan (COZAAR) 50 mg, oral, Daily    metoprolol succinate XL (TOPROL-XL) 50 mg, oral, Daily    nitroglycerin (NITROSTAT) 0.4 mg, sublingual, Every 5 min PRN    nystatin (MYCOSTATIN) 1,000,000 Units, oral, 2 times daily    ondansetron (ZOFRAN) 8 mg, oral, Every 8 hours PRN    potassium chloride CR (Klor-Con) 10 mEq ER tablet 20 mEq, oral, Daily, Do not crush, chew, or split.    prochlorperazine (COMPAZINE) 10  mg, oral, Every 6 hours PRN    sildenafil (VIAGRA) 50 mg, As needed        Diagnostic Results   RESULTS     Results for orders placed or performed in visit on 05/30/25 (from the past 96 hours)   CBC and Auto Differential   Result Value Ref Range    WBC 6.0 4.4 - 11.3 x10*3/uL    nRBC      RBC 3.30 (L) 4.50 - 5.90 x10*6/uL    Hemoglobin 10.6 (L) 13.5 - 17.5 g/dL    Hematocrit 32.9 (L) 41.0 - 52.0 %     80 - 100 fL    MCH 32.1 26.0 - 34.0 pg    MCHC 32.2 32.0 - 36.0 g/dL    RDW 15.3 (H) 11.5 - 14.5 %    Platelets 126 (L) 150 - 450 x10*3/uL    Neutrophils % 54.3 40.0 - 80.0 %    Immature Granulocytes %, Automated 0.5 0.0 - 0.9 %    Lymphocytes % 31.3 13.0 - 44.0 %    Monocytes % 11.4 2.0 - 10.0 %    Eosinophils % 2.2 0.0 - 6.0 %    Basophils % 0.3 0.0 - 2.0 %    Neutrophils Absolute 3.25 1.20 - 7.70 x10*3/uL    Immature Granulocytes Absolute, Automated 0.03 0.00 - 0.70 x10*3/uL    Lymphocytes Absolute 1.87 1.20 - 4.80 x10*3/uL    Monocytes Absolute 0.68 0.10 - 1.00 x10*3/uL    Eosinophils Absolute 0.13 0.00 - 0.70 x10*3/uL    Basophils Absolute 0.02 0.00 - 0.10 x10*3/uL   Comprehensive metabolic panel   Result Value Ref Range    Glucose 115 (H) 74 - 99 mg/dL    Sodium 138 136 - 145 mmol/L    Potassium 3.9 3.5 - 5.3 mmol/L    Chloride 106 98 - 107 mmol/L    Bicarbonate 25 21 - 32 mmol/L    Anion Gap 11 10 - 20 mmol/L    Urea Nitrogen 11 6 - 23 mg/dL    Creatinine 0.60 0.50 - 1.30 mg/dL    eGFR >90 >60 mL/min/1.73m*2    Calcium 8.8 8.6 - 10.3 mg/dL    Albumin 3.8 3.4 - 5.0 g/dL    Alkaline Phosphatase 73 33 - 136 U/L    Total Protein 6.0 (L) 6.4 - 8.2 g/dL    AST 20 9 - 39 U/L    Bilirubin, Total 0.5 0.0 - 1.2 mg/dL    ALT 38 10 - 52 U/L     Recent Imaging     5/30/2025  IMPRESSION:  No sonographic evidence for deep vein thrombosis within the evaluated  veins of the bilateral lower extremities.    4/11/2025 CT CAP   IMPRESSION:  CHEST:  1.  No convincing evidence for metastatic lesions. See discussion  above.       ABDOMEN-PELVIS:  1.  The increased size of locally aggressive pancreas mass with  vascular and involvement resulting in occlusion of the superior  aspect superior mesenteric vein, formation of multiple venous  collateral vessels, and trace dependent peritoneal free fluid.  2. There are multiple liver masses likely metastatic lesions which  are new from the prior exam.        Assessment/Plan   ASSESSMENT     Sathish Iqbal is a 59 y.o. male with invasive adenocarcinoma of pancreas, MSI-S diagnosed in January 2025 after present with abdominal pain who presents in follow up for evaluation prior to C2 Day 15 [place]/Abraxane. He started neoadjuvant mFOLFIRINOX on 2/18/2025. DPYD Phenotype: Normal Metabolizer, DPYD Activity Score: 2.0. Has copy of UGT1A1 gene and developed diarrhea, irinotecan was dose reduced for cycle 2. He completed 4 cycles of FOLFIRINOX but unfortunately new liver mets and increased size in the pancreatic mass was noted on his CT scan on 4/11/2025. He is now stage IV and treatment is considered palliative. He was switched to Gemcitabine and Abraxane started on 4/17/2025. Prior [facility] did reveal a KRAS mutation and we could consider clinical trial at progression. Plan for short term CT after 2 cycles. Patient would like to discuss with radiation though they understand that would not be indicated until we can get better control systemically, referral placed to Dr. Celestin - he needed to reschedule his appt from 4/22 - he will reschedule next week. Patient did get a second opinion with Dr. Anderson and is aware there are clinical trials at progression.      Patient tolerating treatment well with no side effects. His labs are acceptable for treatment. His left lower leg is significantly swollen. I ordered a stat venous duplex which was negative for DVT. I called him with the results. Instructed to elevate, he can use compression stockings as well.     PLAN     # Pancreatic cancer  - Proceed with  "Gemcitabine + Abraxane C2 D15 today   - Treatment is Day 1, 8, 15; repeated every 28 days.   - CT scan scheduled 6/9  - Reschedule XRT consultation   - Signatera from 5/16 is pending     # Left LE edema  - Venous duplex today   - Elevate, compression stockings     Follow up in 2 weeks with Dr. Mesa and Cycle 3 as scheduled. CT prior.     Sathish \"Bill\" was seen today for follow-up.  Diagnoses and all orders for this visit:  Lower extremity edema (Primary)  -     Vascular US lower extremity venous duplex bilateral; Future    Venous Access Orders  Albumin-bound PACLitaxel / Gemcitabine (Day 1, 8, and 15), 28 Day Cycles    Patient verbalizes understanding of above plan. Time provided for patient's questions. All questions answered to patient's satisfaction in office. Patient instructed to reach out for any new concerning issues at [phone].    Rolanda Gallardo MSN, APRN, A-GNP-C, AOCNP  [facility]  Division of Hematology & Medical Oncology   [address] [place] [address]  Phone: [phone]  Available via [employer] Secure Chat  [email or web address]  "

## 2025-05-23 NOTE — SIGNIFICANT EVENT
05/23/25 1128   Prechemo Checklist   Has the patient been in the hospital, ED, or urgent care since last date of service No   Chemo/Immuno Consent Completed and Signed Yes  (4/15/25)   Protocol/Indications Verified Yes   Confirmed to previous date/time of medication Yes  (C2D8 today)   Compared to previous dose Yes   All medications are dated accurately Yes   Pregnancy Test Negative Not applicable   Parameters Met Yes  (labs done 5/22 - ANC: 1.86, PLTs: 268, bili: 0.5)   BSA/Weight-Height Verified Yes   Dose Calculations Verified (current, total, cumulative) Yes

## 2025-05-29 ENCOUNTER — APPOINTMENT (OUTPATIENT)
Dept: HEMATOLOGY/ONCOLOGY | Facility: CLINIC | Age: 60
End: 2025-05-29
Payer: COMMERCIAL

## 2025-05-29 DIAGNOSIS — C25.9 ADENOCARCINOMA OF PANCREAS (MULTI): ICD-10-CM

## 2025-05-30 ENCOUNTER — OFFICE VISIT (OUTPATIENT)
Dept: HEMATOLOGY/ONCOLOGY | Facility: CLINIC | Age: 60
End: 2025-05-30
Payer: COMMERCIAL

## 2025-05-30 ENCOUNTER — LAB (OUTPATIENT)
Dept: LAB | Facility: CLINIC | Age: 60
End: 2025-05-30
Payer: COMMERCIAL

## 2025-05-30 ENCOUNTER — HOSPITAL ENCOUNTER (OUTPATIENT)
Dept: RADIOLOGY | Facility: HOSPITAL | Age: 60
Discharge: HOME | End: 2025-05-30
Payer: COMMERCIAL

## 2025-05-30 ENCOUNTER — INFUSION (OUTPATIENT)
Dept: HEMATOLOGY/ONCOLOGY | Facility: CLINIC | Age: 60
End: 2025-05-30
Payer: COMMERCIAL

## 2025-05-30 VITALS
RESPIRATION RATE: 16 BRPM | TEMPERATURE: 97.3 F | DIASTOLIC BLOOD PRESSURE: 62 MMHG | BODY MASS INDEX: 28.77 KG/M2 | WEIGHT: 195.11 LBS | HEART RATE: 71 BPM | SYSTOLIC BLOOD PRESSURE: 105 MMHG | OXYGEN SATURATION: 98 %

## 2025-05-30 DIAGNOSIS — R60.0 LOWER EXTREMITY EDEMA: ICD-10-CM

## 2025-05-30 DIAGNOSIS — R60.0 LOWER EXTREMITY EDEMA: Primary | ICD-10-CM

## 2025-05-30 DIAGNOSIS — C25.9 ADENOCARCINOMA OF PANCREAS (MULTI): ICD-10-CM

## 2025-05-30 LAB
ALBUMIN SERPL BCP-MCNC: 3.8 G/DL (ref 3.4–5)
ALP SERPL-CCNC: 73 U/L (ref 33–136)
ALT SERPL W P-5'-P-CCNC: 38 U/L (ref 10–52)
ANION GAP SERPL CALC-SCNC: 11 MMOL/L (ref 10–20)
AST SERPL W P-5'-P-CCNC: 20 U/L (ref 9–39)
BASOPHILS # BLD AUTO: 0.02 X10*3/UL (ref 0–0.1)
BASOPHILS NFR BLD AUTO: 0.3 %
BILIRUB SERPL-MCNC: 0.5 MG/DL (ref 0–1.2)
BUN SERPL-MCNC: 11 MG/DL (ref 6–23)
CALCIUM SERPL-MCNC: 8.8 MG/DL (ref 8.6–10.3)
CHLORIDE SERPL-SCNC: 106 MMOL/L (ref 98–107)
CO2 SERPL-SCNC: 25 MMOL/L (ref 21–32)
CREAT SERPL-MCNC: 0.6 MG/DL (ref 0.5–1.3)
EGFRCR SERPLBLD CKD-EPI 2021: >90 ML/MIN/1.73M*2
EOSINOPHIL # BLD AUTO: 0.13 X10*3/UL (ref 0–0.7)
EOSINOPHIL NFR BLD AUTO: 2.2 %
ERYTHROCYTE [DISTWIDTH] IN BLOOD BY AUTOMATED COUNT: 15.3 % (ref 11.5–14.5)
GLUCOSE SERPL-MCNC: 115 MG/DL (ref 74–99)
HCT VFR BLD AUTO: 32.9 % (ref 41–52)
HGB BLD-MCNC: 10.6 G/DL (ref 13.5–17.5)
IMM GRANULOCYTES # BLD AUTO: 0.03 X10*3/UL (ref 0–0.7)
IMM GRANULOCYTES NFR BLD AUTO: 0.5 % (ref 0–0.9)
LYMPHOCYTES # BLD AUTO: 1.87 X10*3/UL (ref 1.2–4.8)
LYMPHOCYTES NFR BLD AUTO: 31.3 %
MCH RBC QN AUTO: 32.1 PG (ref 26–34)
MCHC RBC AUTO-ENTMCNC: 32.2 G/DL (ref 32–36)
MCV RBC AUTO: 100 FL (ref 80–100)
MONOCYTES # BLD AUTO: 0.68 X10*3/UL (ref 0.1–1)
MONOCYTES NFR BLD AUTO: 11.4 %
NEUTROPHILS # BLD AUTO: 3.25 X10*3/UL (ref 1.2–7.7)
NEUTROPHILS NFR BLD AUTO: 54.3 %
NRBC BLD-RTO: ABNORMAL /100{WBCS}
PLATELET # BLD AUTO: 126 X10*3/UL (ref 150–450)
POTASSIUM SERPL-SCNC: 3.9 MMOL/L (ref 3.5–5.3)
PROT SERPL-MCNC: 6 G/DL (ref 6.4–8.2)
RBC # BLD AUTO: 3.3 X10*6/UL (ref 4.5–5.9)
SODIUM SERPL-SCNC: 138 MMOL/L (ref 136–145)
WBC # BLD AUTO: 6 X10*3/UL (ref 4.4–11.3)

## 2025-05-30 PROCEDURE — 3078F DIAST BP <80 MM HG: CPT

## 2025-05-30 PROCEDURE — 3074F SYST BP LT 130 MM HG: CPT

## 2025-05-30 PROCEDURE — 2500000004 HC RX 250 GENERAL PHARMACY W/ HCPCS (ALT 636 FOR OP/ED): Mod: JW | Performed by: INTERNAL MEDICINE

## 2025-05-30 PROCEDURE — 99213 OFFICE O/P EST LOW 20 MIN: CPT

## 2025-05-30 PROCEDURE — 96417 CHEMO IV INFUS EACH ADDL SEQ: CPT

## 2025-05-30 PROCEDURE — 93970 EXTREMITY STUDY: CPT

## 2025-05-30 PROCEDURE — 36415 COLL VENOUS BLD VENIPUNCTURE: CPT

## 2025-05-30 PROCEDURE — 80053 COMPREHEN METABOLIC PANEL: CPT

## 2025-05-30 PROCEDURE — 85025 COMPLETE CBC W/AUTO DIFF WBC: CPT

## 2025-05-30 PROCEDURE — 96413 CHEMO IV INFUSION 1 HR: CPT

## 2025-05-30 PROCEDURE — 96375 TX/PRO/DX INJ NEW DRUG ADDON: CPT | Mod: INF

## 2025-05-30 PROCEDURE — 2500000004 HC RX 250 GENERAL PHARMACY W/ HCPCS (ALT 636 FOR OP/ED): Mod: JZ | Performed by: INTERNAL MEDICINE

## 2025-05-30 RX ORDER — ONDANSETRON HYDROCHLORIDE 2 MG/ML
8 INJECTION, SOLUTION INTRAVENOUS ONCE
Status: COMPLETED | OUTPATIENT
Start: 2025-05-30 | End: 2025-05-30

## 2025-05-30 RX ORDER — FAMOTIDINE 10 MG/ML
20 INJECTION, SOLUTION INTRAVENOUS ONCE AS NEEDED
Status: DISCONTINUED | OUTPATIENT
Start: 2025-05-30 | End: 2025-05-30 | Stop reason: HOSPADM

## 2025-05-30 RX ORDER — EPINEPHRINE 0.3 MG/.3ML
0.3 INJECTION SUBCUTANEOUS EVERY 5 MIN PRN
Status: DISCONTINUED | OUTPATIENT
Start: 2025-05-30 | End: 2025-05-30 | Stop reason: HOSPADM

## 2025-05-30 RX ORDER — PROCHLORPERAZINE MALEATE 10 MG
10 TABLET ORAL EVERY 6 HOURS PRN
Status: DISCONTINUED | OUTPATIENT
Start: 2025-05-30 | End: 2025-05-30 | Stop reason: HOSPADM

## 2025-05-30 RX ORDER — DIPHENHYDRAMINE HYDROCHLORIDE 50 MG/ML
50 INJECTION, SOLUTION INTRAMUSCULAR; INTRAVENOUS AS NEEDED
Status: DISCONTINUED | OUTPATIENT
Start: 2025-05-30 | End: 2025-05-30 | Stop reason: HOSPADM

## 2025-05-30 RX ORDER — ALBUTEROL SULFATE 0.83 MG/ML
3 SOLUTION RESPIRATORY (INHALATION) AS NEEDED
Status: DISCONTINUED | OUTPATIENT
Start: 2025-05-30 | End: 2025-05-30 | Stop reason: HOSPADM

## 2025-05-30 RX ORDER — HEPARIN SODIUM,PORCINE/PF 10 UNIT/ML
50 SYRINGE (ML) INTRAVENOUS AS NEEDED
OUTPATIENT
Start: 2025-05-30

## 2025-05-30 RX ORDER — HEPARIN 100 UNIT/ML
500 SYRINGE INTRAVENOUS AS NEEDED
OUTPATIENT
Start: 2025-05-30

## 2025-05-30 RX ORDER — PROCHLORPERAZINE EDISYLATE 5 MG/ML
10 INJECTION INTRAMUSCULAR; INTRAVENOUS EVERY 6 HOURS PRN
Status: DISCONTINUED | OUTPATIENT
Start: 2025-05-30 | End: 2025-05-30 | Stop reason: HOSPADM

## 2025-05-30 RX ADMIN — GEMCITABINE HYDROCHLORIDE 2021.6 MG: 2 INJECTION, SOLUTION INTRAVENOUS at 11:32

## 2025-05-30 RX ADMIN — ONDANSETRON 8 MG: 2 INJECTION INTRAMUSCULAR; INTRAVENOUS at 10:18

## 2025-05-30 RX ADMIN — PACLITAXEL 255 MG: 100 INJECTION, POWDER, LYOPHILIZED, FOR SUSPENSION INTRAVENOUS at 10:48

## 2025-05-30 ASSESSMENT — PAIN SCALES - GENERAL: PAINLEVEL_OUTOF10: 0-NO PAIN

## 2025-05-30 ASSESSMENT — ENCOUNTER SYMPTOMS: LEG SWELLING: 1

## 2025-06-03 LAB — SCAN RESULT: NORMAL

## 2025-06-09 ENCOUNTER — HOSPITAL ENCOUNTER (OUTPATIENT)
Dept: RADIOLOGY | Facility: HOSPITAL | Age: 60
Discharge: HOME | End: 2025-06-09
Payer: COMMERCIAL

## 2025-06-09 ENCOUNTER — APPOINTMENT (OUTPATIENT)
Dept: RADIOLOGY | Facility: HOSPITAL | Age: 60
End: 2025-06-09
Payer: COMMERCIAL

## 2025-06-09 DIAGNOSIS — C25.9 ADENOCARCINOMA OF PANCREAS (MULTI): ICD-10-CM

## 2025-06-09 PROCEDURE — 2550000001 HC RX 255 CONTRASTS: Performed by: INTERNAL MEDICINE

## 2025-06-09 PROCEDURE — 71260 CT THORAX DX C+: CPT | Performed by: RADIOLOGY

## 2025-06-09 PROCEDURE — 74177 CT ABD & PELVIS W/CONTRAST: CPT

## 2025-06-09 PROCEDURE — 74177 CT ABD & PELVIS W/CONTRAST: CPT | Performed by: RADIOLOGY

## 2025-06-09 RX ADMIN — IOHEXOL 75 ML: 350 INJECTION, SOLUTION INTRAVENOUS at 08:00

## 2025-06-09 NOTE — PROGRESS NOTES
Patient ID: Hardy Iqbal is a 59 y.o. male.    Oncology History   Adenocarcinoma of pancreas (Multi)   2/3/2025 Initial Diagnosis    Adenocarcinoma of pancreas (Multi)     2/18/2025 - 4/3/2025 Chemotherapy    mFOLFIRINOX (Fluorouracil Continuous Infusion / Leucovorin / Irinotecan / Oxaliplatin), 14 Day Cycles     4/17/2025 -  Chemotherapy    Albumin-bound PACLitaxel / Gemcitabine (Day 1, 8, and 15), 28 Day Cycles     7/22/2025 - 7/22/2025 Chemotherapy    mFOLFIRINOX (Fluorouracil Continuous Infusion / Leucovorin / Irinotecan / Oxaliplatin), 14 Day Cycles           Subjective    HPI  Mr. Sathish Iqbal is a 59 y.o. male presenting for follow up of pancreatic cancer.  Currently on mFOLFIRINOX.      The patient had an episode of fever after 2nd chemotherapy, only for 1 hour, resolved.   Has had weight gain and lower extremity edema.  No claudication.    There is no chest pain or shortness of breath.  No fatigue.    Endorses change in taste.    Patient's past medical history, surgical history, family history and social history reviewed.    Review of Systems:   Review of Systems:    Positive per HPI, otherwise negative.       Objective    BSA: 2.11 meters squared  /60 (BP Location: Right arm, Patient Position: Sitting, BP Cuff Size: Adult)   Pulse 75   Temp 36.4 °C (97.5 °F) (Temporal)   Resp 17   Wt 91.4 kg (201 lb 8 oz)   SpO2 97%   BMI 29.71 kg/m²        Physical Exam  Gen: appears well in clinic, NAD  HEENT: atraumatic head, normocephalic, EOMI, conjunctiva normal  LUNG: no increased WOB, CTAB  CV: No JVD. RRR  GI: soft, NT, ND  LE: no LE edema on today's exam  Skin: no obvious rashes or lesions on visible skin  Neuro: interactive, no focal deficits noted  Psych: normal mood and affect    Performance Status:  Symptomatic; fully ambulatory    Labs/Imaging/Pathology: Personally reviewed reports and images in Epic electronic medical record system. Pertinent results as it related to the plan represented in  below in assessment and plan.     Lab Results   Component Value Date    WBC 6.8 06/12/2025    NEUTROABS 3.43 06/12/2025    IGABSOL 0.03 06/12/2025    LYMPHSABS 1.94 06/12/2025    MONOSABS 1.08 (H) 06/12/2025    EOSABS 0.24 06/12/2025    BASOSABS 0.03 06/12/2025    RBC 3.14 (L) 06/12/2025     (H) 06/12/2025    MCHC 30.6 (L) 06/12/2025    HGB 9.7 (L) 06/12/2025    HCT 31.7 (L) 06/12/2025     06/12/2025      Lab Results   Component Value Date    CREATININE 0.61 06/12/2025    BUN 6 06/12/2025     06/12/2025    K 4.3 06/12/2025     (H) 06/12/2025    CO2 23 06/12/2025     Lab Results   Component Value Date    ALT 29 06/12/2025    AST 25 06/12/2025    ALKPHOS 84 06/12/2025    BILITOT 0.5 06/12/2025         Assessment/Plan   Invasive adenocarcinoma of pancreas, MSI-S, +KRAS p.G12D, TP53 p.R342  - Initial CA 19-9 less than 4   - Patient was initially presented to the emergency room on 1/2/25 for worsening abdominal pain.   - CT abdomen pelvis at that time revealed a defined mass involving the uncinate process of the pancreas  - biopsy on 1/29/25, which showed invasive adenocarcinoma poorly differentiated. MSI status intact  - CT of the chest on 1/24/25 showed no clear evidence of metastases  - met with Dr. Castro on 1/23/25 and presents to discuss neoadjuvant  chemotherapy today  - patient has a good PS and discussed FOLFIRINOS with 5-FU, oxaliplatin, and irinotecan  - 2/18/25 C1D1  - one variant of UTG1A1 gene and given diarrhea will dose reduce iriniotecan by 25% for C2  - 4/11/25 CT c/a/p with progression of pancreatic mass and new liver lesions    4/14/25:  -Today we reviewed at length recent CT of the chest abdomen pelvis and reviewed images together, which does show increase in size of the pancreatic mass and new liver lesions concerning for metastases  -Reviewed this would not be considered stage IV advanced pancreatic cancer and goal for systemic treatment would be for palliative  intent  -Discussed I do want to switch his systemic chemotherapy to gemcitabine plus Abraxane, reviewed side effects and consent signed  - repeat Guardant 360 and initial signatera today  -Prior Tempus did reveal a KRAS mutation and we could consider clinical trial at progression  - will plan to start C1 on 4/17/35  - michelle short term CT after 2 cycles  - Patient would like to discuss with radiation though they understand that would not be indicated until we can get better control systemically, referral placed to Dr. Celestin    4/24/25:  - Proceed with Gemcitabine + Abraxane C1 D8 today   - Treatment is Day 1, 8, 15; repeated every 28 days.   - CT scan after Cycle 2   - Reschedule XRT consultation  - Ok to proceed today, will monitor neutropenia closely     5/16/25:   - Patient did not have any increased toxicity with first cycle   - He denies neuropathy or GI toxicity   - He does have thrush, Nystatin refilled   - We discussed we will do repeat Signatera today for closer time frame to assess response.   - CT CAP before cycle 3   - No other changes at this time   - Of note, patient did get a second opinion with Dr. Anderson and is aware there are clinical trials at progression   - RTC with me for next cycle, he will see Rolanda in between    6/13/25:  - Reviewed CT scan which show treatment response in the liver and the primary pancreatic mass. Unclear why new attenuation in liver, will monitor  - Signaterahas also decreased, consistent with response.    - Lower extremity edema and weight gain since last visit and lasix prescribed to be taken as needed.  Advised compression stockings and ambulation.   - We discussed at this time we will continue with current schedule gemcitabine plus abraxane D1, D8, D15 every 28 days, if new toxicity they are aware we can drop D8 given palliative intent, for now since no new toxicity wish to stay aggressive  - Reviewed this is a high risk therapy for an illness that poses threat to  life. Labs, exam, and history are appropriate for treatment  - Return to clinic with me for next cycle.      Reviewed ongoing medical problems and how they relate to his malignancy, will continue long term monitoring.    RTC in 4 weeks with me for next cycle.    This note has been transcribed using a medical scribe and there is a possibility of unintentional typing misprints    Assessment & Plan  Adenocarcinoma of pancreas (Multi)    Orders:    Clinic Appointment Request    Clinic Appointment Request; Future    Infusion Appointment Request; Future    CBC and Auto Differential; Future    Comprehensive metabolic panel; Future    Infusion Appointment Request; Future    CBC and Auto Differential; Future    Comprehensive metabolic panel; Future    Infusion Appointment Request; Future    CBC and Auto Differential; Future    Comprehensive metabolic panel; Future    Clinic Appointment Request; Future    Infusion Appointment Request; Future    CBC and Auto Differential; Future    Comprehensive metabolic panel; Future    Infusion Appointment Request; Future    CBC and Auto Differential; Future    Comprehensive metabolic panel; Future    Infusion Appointment Request; Future    CBC and Auto Differential; Future    Comprehensive metabolic panel; Future    Lower extremity edema    Orders:    furosemide (Lasix) 20 mg tablet; Take 1 tablet (20 mg) by mouth once daily as needed (LE edema secondary to fluid retention).          Ashley Mesa MD  Hematology/Oncology  [facility]      Scribe Attestation  By signing my name below, ISanaz Scribe, attest that this documentation has been prepared under the direction and in the presence of Ashley Mesa MD.        Scribe Attestation  By signing my name below, Dominique MONTES Scribe, attest that this documentation has been prepared under the direction and in the presence of Ashley Mesa MD.

## 2025-06-10 NOTE — ANESTHESIA PREPROCEDURE EVALUATION
"Patient: Sathish Iqbal \"Bill\"    Procedure Information       Date/Time: 01/29/25 1320    Scheduled providers: Laura Hernandez MD    Procedure: ENDOSCOPIC ULTRASOUND (UPPER)    Location: [facility]            Relevant Problems   Cardiac   (+) CAD (coronary artery disease)   (+) Hyperlipidemia   (+) Hypertension   (+) NSTEMI, initial episode of care (Multi)   (+) Subsequent non-ST elevation (NSTEMI) myocardial infarction       Clinical information reviewed:    Allergies  Meds               NPO Detail:  NPO/Void Status  Date of Last Liquid: 01/29/25  Time of Last Liquid: 1000  Date of Last Solid: 01/28/25  Time of Last Solid: 1930         Physical Exam    Airway  Mallampati: II  TM distance: >3 FB  Neck ROM: full     Cardiovascular - normal exam     Dental    Pulmonary - normal exam     Abdominal - normal exam             Anesthesia Plan    History of general anesthesia?: yes  History of complications of general anesthesia?: no    ASA 3     MAC     intravenous induction   Anesthetic plan and risks discussed with patient.    Plan discussed with CRNA.      " 3 = A little assistance

## 2025-06-12 ENCOUNTER — LAB (OUTPATIENT)
Dept: LAB | Facility: CLINIC | Age: 60
End: 2025-06-12
Payer: COMMERCIAL

## 2025-06-12 ENCOUNTER — APPOINTMENT (OUTPATIENT)
Dept: HEMATOLOGY/ONCOLOGY | Facility: CLINIC | Age: 60
End: 2025-06-12
Payer: COMMERCIAL

## 2025-06-12 DIAGNOSIS — C25.9 ADENOCARCINOMA OF PANCREAS (MULTI): ICD-10-CM

## 2025-06-12 LAB
ALBUMIN SERPL BCP-MCNC: 3.5 G/DL (ref 3.4–5)
ALP SERPL-CCNC: 84 U/L (ref 33–136)
ALT SERPL W P-5'-P-CCNC: 29 U/L (ref 10–52)
ANION GAP SERPL CALC-SCNC: 11 MMOL/L (ref 10–20)
AST SERPL W P-5'-P-CCNC: 25 U/L (ref 9–39)
BASOPHILS # BLD AUTO: 0.03 X10*3/UL (ref 0–0.1)
BASOPHILS NFR BLD AUTO: 0.4 %
BILIRUB SERPL-MCNC: 0.5 MG/DL (ref 0–1.2)
BUN SERPL-MCNC: 6 MG/DL (ref 6–23)
CALCIUM SERPL-MCNC: 8.3 MG/DL (ref 8.6–10.3)
CHLORIDE SERPL-SCNC: 110 MMOL/L (ref 98–107)
CO2 SERPL-SCNC: 23 MMOL/L (ref 21–32)
CREAT SERPL-MCNC: 0.61 MG/DL (ref 0.5–1.3)
EGFRCR SERPLBLD CKD-EPI 2021: >90 ML/MIN/1.73M*2
EOSINOPHIL # BLD AUTO: 0.24 X10*3/UL (ref 0–0.7)
EOSINOPHIL NFR BLD AUTO: 3.6 %
ERYTHROCYTE [DISTWIDTH] IN BLOOD BY AUTOMATED COUNT: 15.4 % (ref 11.5–14.5)
GLUCOSE SERPL-MCNC: 102 MG/DL (ref 74–99)
HCT VFR BLD AUTO: 31.7 % (ref 41–52)
HGB BLD-MCNC: 9.7 G/DL (ref 13.5–17.5)
IMM GRANULOCYTES # BLD AUTO: 0.03 X10*3/UL (ref 0–0.7)
IMM GRANULOCYTES NFR BLD AUTO: 0.4 % (ref 0–0.9)
LYMPHOCYTES # BLD AUTO: 1.94 X10*3/UL (ref 1.2–4.8)
LYMPHOCYTES NFR BLD AUTO: 28.7 %
MCH RBC QN AUTO: 30.9 PG (ref 26–34)
MCHC RBC AUTO-ENTMCNC: 30.6 G/DL (ref 32–36)
MCV RBC AUTO: 101 FL (ref 80–100)
MONOCYTES # BLD AUTO: 1.08 X10*3/UL (ref 0.1–1)
MONOCYTES NFR BLD AUTO: 16 %
NEUTROPHILS # BLD AUTO: 3.43 X10*3/UL (ref 1.2–7.7)
NEUTROPHILS NFR BLD AUTO: 50.9 %
NRBC BLD-RTO: ABNORMAL /100{WBCS}
PLATELET # BLD AUTO: 341 X10*3/UL (ref 150–450)
POTASSIUM SERPL-SCNC: 4.3 MMOL/L (ref 3.5–5.3)
PROT SERPL-MCNC: 5.5 G/DL (ref 6.4–8.2)
RBC # BLD AUTO: 3.14 X10*6/UL (ref 4.5–5.9)
SODIUM SERPL-SCNC: 140 MMOL/L (ref 136–145)
WBC # BLD AUTO: 6.8 X10*3/UL (ref 4.4–11.3)

## 2025-06-12 PROCEDURE — 85025 COMPLETE CBC W/AUTO DIFF WBC: CPT

## 2025-06-12 PROCEDURE — 80053 COMPREHEN METABOLIC PANEL: CPT

## 2025-06-12 PROCEDURE — 36415 COLL VENOUS BLD VENIPUNCTURE: CPT

## 2025-06-13 ENCOUNTER — OFFICE VISIT (OUTPATIENT)
Dept: HEMATOLOGY/ONCOLOGY | Facility: CLINIC | Age: 60
End: 2025-06-13
Payer: COMMERCIAL

## 2025-06-13 ENCOUNTER — INFUSION (OUTPATIENT)
Dept: HEMATOLOGY/ONCOLOGY | Facility: CLINIC | Age: 60
End: 2025-06-13
Payer: COMMERCIAL

## 2025-06-13 VITALS
DIASTOLIC BLOOD PRESSURE: 60 MMHG | BODY MASS INDEX: 29.71 KG/M2 | RESPIRATION RATE: 17 BRPM | TEMPERATURE: 97.5 F | HEART RATE: 75 BPM | WEIGHT: 201.5 LBS | SYSTOLIC BLOOD PRESSURE: 112 MMHG | OXYGEN SATURATION: 97 %

## 2025-06-13 DIAGNOSIS — C25.9 ADENOCARCINOMA OF PANCREAS (MULTI): ICD-10-CM

## 2025-06-13 DIAGNOSIS — R60.0 LOWER EXTREMITY EDEMA: Primary | ICD-10-CM

## 2025-06-13 PROCEDURE — 96413 CHEMO IV INFUSION 1 HR: CPT

## 2025-06-13 PROCEDURE — 96375 TX/PRO/DX INJ NEW DRUG ADDON: CPT | Mod: INF

## 2025-06-13 PROCEDURE — 96417 CHEMO IV INFUS EACH ADDL SEQ: CPT

## 2025-06-13 PROCEDURE — 2500000004 HC RX 250 GENERAL PHARMACY W/ HCPCS (ALT 636 FOR OP/ED): Performed by: INTERNAL MEDICINE

## 2025-06-13 PROCEDURE — 99215 OFFICE O/P EST HI 40 MIN: CPT | Performed by: INTERNAL MEDICINE

## 2025-06-13 PROCEDURE — 99215 OFFICE O/P EST HI 40 MIN: CPT | Mod: 25 | Performed by: INTERNAL MEDICINE

## 2025-06-13 PROCEDURE — 3074F SYST BP LT 130 MM HG: CPT | Performed by: INTERNAL MEDICINE

## 2025-06-13 PROCEDURE — 3078F DIAST BP <80 MM HG: CPT | Performed by: INTERNAL MEDICINE

## 2025-06-13 RX ORDER — FAMOTIDINE 10 MG/ML
20 INJECTION, SOLUTION INTRAVENOUS ONCE AS NEEDED
OUTPATIENT
Start: 2025-07-17

## 2025-06-13 RX ORDER — DIPHENHYDRAMINE HYDROCHLORIDE 50 MG/ML
50 INJECTION, SOLUTION INTRAMUSCULAR; INTRAVENOUS AS NEEDED
OUTPATIENT
Start: 2025-07-24

## 2025-06-13 RX ORDER — HEPARIN SODIUM,PORCINE/PF 10 UNIT/ML
50 SYRINGE (ML) INTRAVENOUS AS NEEDED
OUTPATIENT
Start: 2025-06-13

## 2025-06-13 RX ORDER — ALBUTEROL SULFATE 0.83 MG/ML
3 SOLUTION RESPIRATORY (INHALATION) AS NEEDED
OUTPATIENT
Start: 2025-08-14

## 2025-06-13 RX ORDER — FAMOTIDINE 10 MG/ML
20 INJECTION, SOLUTION INTRAVENOUS ONCE AS NEEDED
OUTPATIENT
Start: 2025-08-21

## 2025-06-13 RX ORDER — EPINEPHRINE 0.3 MG/.3ML
0.3 INJECTION SUBCUTANEOUS EVERY 5 MIN PRN
OUTPATIENT
Start: 2025-07-24

## 2025-06-13 RX ORDER — EPINEPHRINE 0.3 MG/.3ML
0.3 INJECTION SUBCUTANEOUS EVERY 5 MIN PRN
OUTPATIENT
Start: 2025-08-14

## 2025-06-13 RX ORDER — PROCHLORPERAZINE EDISYLATE 5 MG/ML
10 INJECTION INTRAMUSCULAR; INTRAVENOUS EVERY 6 HOURS PRN
OUTPATIENT
Start: 2025-08-21

## 2025-06-13 RX ORDER — DIPHENHYDRAMINE HYDROCHLORIDE 50 MG/ML
50 INJECTION, SOLUTION INTRAMUSCULAR; INTRAVENOUS AS NEEDED
OUTPATIENT
Start: 2025-07-10

## 2025-06-13 RX ORDER — EPINEPHRINE 0.3 MG/.3ML
0.3 INJECTION SUBCUTANEOUS EVERY 5 MIN PRN
OUTPATIENT
Start: 2025-07-10

## 2025-06-13 RX ORDER — DIPHENHYDRAMINE HYDROCHLORIDE 50 MG/ML
50 INJECTION, SOLUTION INTRAMUSCULAR; INTRAVENOUS AS NEEDED
OUTPATIENT
Start: 2025-08-07

## 2025-06-13 RX ORDER — ALBUTEROL SULFATE 0.83 MG/ML
3 SOLUTION RESPIRATORY (INHALATION) AS NEEDED
OUTPATIENT
Start: 2025-07-10

## 2025-06-13 RX ORDER — PROCHLORPERAZINE EDISYLATE 5 MG/ML
10 INJECTION INTRAMUSCULAR; INTRAVENOUS EVERY 6 HOURS PRN
OUTPATIENT
Start: 2025-08-14

## 2025-06-13 RX ORDER — FUROSEMIDE 20 MG/1
20 TABLET ORAL DAILY PRN
Qty: 15 TABLET | Refills: 11 | Status: SHIPPED | OUTPATIENT
Start: 2025-06-13 | End: 2026-06-13

## 2025-06-13 RX ORDER — PROCHLORPERAZINE MALEATE 10 MG
10 TABLET ORAL EVERY 6 HOURS PRN
OUTPATIENT
Start: 2025-08-14

## 2025-06-13 RX ORDER — DIPHENHYDRAMINE HYDROCHLORIDE 50 MG/ML
50 INJECTION, SOLUTION INTRAMUSCULAR; INTRAVENOUS AS NEEDED
OUTPATIENT
Start: 2025-08-21

## 2025-06-13 RX ORDER — PROCHLORPERAZINE MALEATE 10 MG
10 TABLET ORAL EVERY 6 HOURS PRN
OUTPATIENT
Start: 2025-08-07

## 2025-06-13 RX ORDER — ONDANSETRON HYDROCHLORIDE 2 MG/ML
8 INJECTION, SOLUTION INTRAVENOUS ONCE
Status: COMPLETED | OUTPATIENT
Start: 2025-06-13 | End: 2025-06-13

## 2025-06-13 RX ORDER — ONDANSETRON HYDROCHLORIDE 2 MG/ML
8 INJECTION, SOLUTION INTRAVENOUS ONCE
OUTPATIENT
Start: 2025-07-10

## 2025-06-13 RX ORDER — ONDANSETRON HYDROCHLORIDE 2 MG/ML
8 INJECTION, SOLUTION INTRAVENOUS ONCE
OUTPATIENT
Start: 2025-08-21

## 2025-06-13 RX ORDER — DIPHENHYDRAMINE HYDROCHLORIDE 50 MG/ML
50 INJECTION, SOLUTION INTRAMUSCULAR; INTRAVENOUS AS NEEDED
OUTPATIENT
Start: 2025-08-14

## 2025-06-13 RX ORDER — ONDANSETRON HYDROCHLORIDE 2 MG/ML
8 INJECTION, SOLUTION INTRAVENOUS ONCE
OUTPATIENT
Start: 2025-08-07

## 2025-06-13 RX ORDER — ONDANSETRON HYDROCHLORIDE 2 MG/ML
8 INJECTION, SOLUTION INTRAVENOUS ONCE
OUTPATIENT
Start: 2025-07-17

## 2025-06-13 RX ORDER — FAMOTIDINE 10 MG/ML
20 INJECTION, SOLUTION INTRAVENOUS ONCE AS NEEDED
OUTPATIENT
Start: 2025-08-07

## 2025-06-13 RX ORDER — ALBUTEROL SULFATE 0.83 MG/ML
3 SOLUTION RESPIRATORY (INHALATION) AS NEEDED
OUTPATIENT
Start: 2025-08-21

## 2025-06-13 RX ORDER — EPINEPHRINE 0.3 MG/.3ML
0.3 INJECTION SUBCUTANEOUS EVERY 5 MIN PRN
OUTPATIENT
Start: 2025-07-17

## 2025-06-13 RX ORDER — FAMOTIDINE 10 MG/ML
20 INJECTION, SOLUTION INTRAVENOUS ONCE AS NEEDED
OUTPATIENT
Start: 2025-07-24

## 2025-06-13 RX ORDER — ONDANSETRON HYDROCHLORIDE 2 MG/ML
8 INJECTION, SOLUTION INTRAVENOUS ONCE
OUTPATIENT
Start: 2025-07-24

## 2025-06-13 RX ORDER — PROCHLORPERAZINE EDISYLATE 5 MG/ML
10 INJECTION INTRAMUSCULAR; INTRAVENOUS EVERY 6 HOURS PRN
OUTPATIENT
Start: 2025-07-24

## 2025-06-13 RX ORDER — HEPARIN 100 UNIT/ML
500 SYRINGE INTRAVENOUS AS NEEDED
OUTPATIENT
Start: 2025-06-13

## 2025-06-13 RX ORDER — PROCHLORPERAZINE MALEATE 10 MG
10 TABLET ORAL EVERY 6 HOURS PRN
OUTPATIENT
Start: 2025-07-17

## 2025-06-13 RX ORDER — FAMOTIDINE 10 MG/ML
20 INJECTION, SOLUTION INTRAVENOUS ONCE AS NEEDED
OUTPATIENT
Start: 2025-08-14

## 2025-06-13 RX ORDER — EPINEPHRINE 0.3 MG/.3ML
0.3 INJECTION SUBCUTANEOUS EVERY 5 MIN PRN
OUTPATIENT
Start: 2025-08-07

## 2025-06-13 RX ORDER — ALBUTEROL SULFATE 0.83 MG/ML
3 SOLUTION RESPIRATORY (INHALATION) AS NEEDED
OUTPATIENT
Start: 2025-07-17

## 2025-06-13 RX ORDER — PROCHLORPERAZINE MALEATE 10 MG
10 TABLET ORAL EVERY 6 HOURS PRN
OUTPATIENT
Start: 2025-07-10

## 2025-06-13 RX ORDER — PROCHLORPERAZINE EDISYLATE 5 MG/ML
10 INJECTION INTRAMUSCULAR; INTRAVENOUS EVERY 6 HOURS PRN
OUTPATIENT
Start: 2025-08-07

## 2025-06-13 RX ORDER — PROCHLORPERAZINE EDISYLATE 5 MG/ML
10 INJECTION INTRAMUSCULAR; INTRAVENOUS EVERY 6 HOURS PRN
OUTPATIENT
Start: 2025-07-17

## 2025-06-13 RX ORDER — FAMOTIDINE 10 MG/ML
20 INJECTION, SOLUTION INTRAVENOUS ONCE AS NEEDED
OUTPATIENT
Start: 2025-07-10

## 2025-06-13 RX ORDER — ONDANSETRON HYDROCHLORIDE 2 MG/ML
8 INJECTION, SOLUTION INTRAVENOUS ONCE
OUTPATIENT
Start: 2025-08-14

## 2025-06-13 RX ORDER — PROCHLORPERAZINE MALEATE 10 MG
10 TABLET ORAL EVERY 6 HOURS PRN
OUTPATIENT
Start: 2025-07-24

## 2025-06-13 RX ORDER — PROCHLORPERAZINE MALEATE 10 MG
10 TABLET ORAL EVERY 6 HOURS PRN
OUTPATIENT
Start: 2025-08-21

## 2025-06-13 RX ORDER — ALBUTEROL SULFATE 0.83 MG/ML
3 SOLUTION RESPIRATORY (INHALATION) AS NEEDED
OUTPATIENT
Start: 2025-08-07

## 2025-06-13 RX ORDER — DIPHENHYDRAMINE HYDROCHLORIDE 50 MG/ML
50 INJECTION, SOLUTION INTRAMUSCULAR; INTRAVENOUS AS NEEDED
OUTPATIENT
Start: 2025-07-17

## 2025-06-13 RX ORDER — EPINEPHRINE 0.3 MG/.3ML
0.3 INJECTION SUBCUTANEOUS EVERY 5 MIN PRN
OUTPATIENT
Start: 2025-08-21

## 2025-06-13 RX ORDER — PROCHLORPERAZINE EDISYLATE 5 MG/ML
10 INJECTION INTRAMUSCULAR; INTRAVENOUS EVERY 6 HOURS PRN
OUTPATIENT
Start: 2025-07-10

## 2025-06-13 RX ORDER — ALBUTEROL SULFATE 0.83 MG/ML
3 SOLUTION RESPIRATORY (INHALATION) AS NEEDED
OUTPATIENT
Start: 2025-07-24

## 2025-06-13 RX ADMIN — ONDANSETRON 8 MG: 2 INJECTION INTRAMUSCULAR; INTRAVENOUS at 10:16

## 2025-06-13 RX ADMIN — PACLITAXEL 255 MG: 100 INJECTION, POWDER, LYOPHILIZED, FOR SUSPENSION INTRAVENOUS at 10:51

## 2025-06-13 RX ADMIN — GEMCITABINE HYDROCHLORIDE 2021.6 MG: 2 INJECTION, SOLUTION INTRAVENOUS at 11:27

## 2025-06-13 ASSESSMENT — PAIN SCALES - GENERAL: PAINLEVEL_OUTOF10: 0-NO PAIN

## 2025-06-13 NOTE — ASSESSMENT & PLAN NOTE
Orders:    Clinic Appointment Request    Clinic Appointment Request; Future    Infusion Appointment Request; Future    CBC and Auto Differential; Future    Comprehensive metabolic panel; Future    Infusion Appointment Request; Future    CBC and Auto Differential; Future    Comprehensive metabolic panel; Future    Infusion Appointment Request; Future    CBC and Auto Differential; Future    Comprehensive metabolic panel; Future    Clinic Appointment Request; Future    Infusion Appointment Request; Future    CBC and Auto Differential; Future    Comprehensive metabolic panel; Future    Infusion Appointment Request; Future    CBC and Auto Differential; Future    Comprehensive metabolic panel; Future    Infusion Appointment Request; Future    CBC and Auto Differential; Future    Comprehensive metabolic panel; Future

## 2025-06-13 NOTE — SIGNIFICANT EVENT
06/13/25 0943   Prechemo Checklist   Has the patient been in the hospital, ED, or urgent care since last date of service No   Chemo/Immuno Consent Completed and Signed Yes  (4/15/25)   Protocol/Indications Verified Yes   Confirmed to previous date/time of medication Yes  (C3D1 today)   Compared to previous dose Yes   All medications are dated accurately Yes   Pregnancy Test Negative Not applicable   Parameters Met Yes  (labs done 6/12/25 - ANC: 3.43, PLTs: 341, bili: 0.5)   BSA/Weight-Height Verified Yes   Dose Calculations Verified (current, total, cumulative) Yes

## 2025-06-19 ENCOUNTER — APPOINTMENT (OUTPATIENT)
Dept: HEMATOLOGY/ONCOLOGY | Facility: CLINIC | Age: 60
End: 2025-06-19
Payer: COMMERCIAL

## 2025-06-19 ENCOUNTER — LAB (OUTPATIENT)
Dept: LAB | Facility: CLINIC | Age: 60
End: 2025-06-19
Payer: COMMERCIAL

## 2025-06-19 DIAGNOSIS — C25.9 ADENOCARCINOMA OF PANCREAS (MULTI): ICD-10-CM

## 2025-06-19 LAB
ALBUMIN SERPL BCP-MCNC: 3.4 G/DL (ref 3.4–5)
ALP SERPL-CCNC: 65 U/L (ref 33–136)
ALT SERPL W P-5'-P-CCNC: 21 U/L (ref 10–52)
ANION GAP SERPL CALC-SCNC: 10 MMOL/L (ref 10–20)
AST SERPL W P-5'-P-CCNC: 20 U/L (ref 9–39)
BASOPHILS # BLD AUTO: 0.01 X10*3/UL (ref 0–0.1)
BASOPHILS NFR BLD AUTO: 0.2 %
BILIRUB SERPL-MCNC: 0.4 MG/DL (ref 0–1.2)
BUN SERPL-MCNC: 11 MG/DL (ref 6–23)
CALCIUM SERPL-MCNC: 8.7 MG/DL (ref 8.6–10.3)
CHLORIDE SERPL-SCNC: 108 MMOL/L (ref 98–107)
CO2 SERPL-SCNC: 25 MMOL/L (ref 21–32)
CREAT SERPL-MCNC: 0.67 MG/DL (ref 0.5–1.3)
EGFRCR SERPLBLD CKD-EPI 2021: >90 ML/MIN/1.73M*2
EOSINOPHIL # BLD AUTO: 0.14 X10*3/UL (ref 0–0.7)
EOSINOPHIL NFR BLD AUTO: 3.1 %
ERYTHROCYTE [DISTWIDTH] IN BLOOD BY AUTOMATED COUNT: 14.6 % (ref 11.5–14.5)
GLUCOSE SERPL-MCNC: 123 MG/DL (ref 74–99)
HCT VFR BLD AUTO: 32.5 % (ref 41–52)
HGB BLD-MCNC: 10.1 G/DL (ref 13.5–17.5)
IMM GRANULOCYTES # BLD AUTO: 0.04 X10*3/UL (ref 0–0.7)
IMM GRANULOCYTES NFR BLD AUTO: 0.9 % (ref 0–0.9)
LYMPHOCYTES # BLD AUTO: 1.59 X10*3/UL (ref 1.2–4.8)
LYMPHOCYTES NFR BLD AUTO: 35.6 %
MCH RBC QN AUTO: 31.4 PG (ref 26–34)
MCHC RBC AUTO-ENTMCNC: 31.1 G/DL (ref 32–36)
MCV RBC AUTO: 101 FL (ref 80–100)
MONOCYTES # BLD AUTO: 0.54 X10*3/UL (ref 0.1–1)
MONOCYTES NFR BLD AUTO: 12.1 %
NEUTROPHILS # BLD AUTO: 2.15 X10*3/UL (ref 1.2–7.7)
NEUTROPHILS NFR BLD AUTO: 48.1 %
PLATELET # BLD AUTO: 264 X10*3/UL (ref 150–450)
POTASSIUM SERPL-SCNC: 3.9 MMOL/L (ref 3.5–5.3)
PROT SERPL-MCNC: 5.6 G/DL (ref 6.4–8.2)
RBC # BLD AUTO: 3.22 X10*6/UL (ref 4.5–5.9)
SODIUM SERPL-SCNC: 139 MMOL/L (ref 136–145)
WBC # BLD AUTO: 4.5 X10*3/UL (ref 4.4–11.3)

## 2025-06-19 PROCEDURE — 36415 COLL VENOUS BLD VENIPUNCTURE: CPT

## 2025-06-19 PROCEDURE — 80053 COMPREHEN METABOLIC PANEL: CPT

## 2025-06-19 PROCEDURE — 85025 COMPLETE CBC W/AUTO DIFF WBC: CPT

## 2025-06-20 ENCOUNTER — INFUSION (OUTPATIENT)
Dept: HEMATOLOGY/ONCOLOGY | Facility: CLINIC | Age: 60
End: 2025-06-20
Payer: COMMERCIAL

## 2025-06-20 VITALS
WEIGHT: 194.67 LBS | TEMPERATURE: 97.2 F | OXYGEN SATURATION: 98 % | SYSTOLIC BLOOD PRESSURE: 110 MMHG | RESPIRATION RATE: 16 BRPM | DIASTOLIC BLOOD PRESSURE: 66 MMHG | HEART RATE: 74 BPM | BODY MASS INDEX: 28.7 KG/M2

## 2025-06-20 DIAGNOSIS — C25.9 ADENOCARCINOMA OF PANCREAS (MULTI): ICD-10-CM

## 2025-06-20 PROCEDURE — 96417 CHEMO IV INFUS EACH ADDL SEQ: CPT

## 2025-06-20 PROCEDURE — 96375 TX/PRO/DX INJ NEW DRUG ADDON: CPT | Mod: INF

## 2025-06-20 PROCEDURE — 96413 CHEMO IV INFUSION 1 HR: CPT

## 2025-06-20 PROCEDURE — 2500000004 HC RX 250 GENERAL PHARMACY W/ HCPCS (ALT 636 FOR OP/ED): Performed by: INTERNAL MEDICINE

## 2025-06-20 RX ORDER — ONDANSETRON HYDROCHLORIDE 2 MG/ML
8 INJECTION, SOLUTION INTRAVENOUS ONCE
Status: COMPLETED | OUTPATIENT
Start: 2025-06-20 | End: 2025-06-20

## 2025-06-20 RX ORDER — HEPARIN 100 UNIT/ML
500 SYRINGE INTRAVENOUS AS NEEDED
OUTPATIENT
Start: 2025-06-20

## 2025-06-20 RX ORDER — HEPARIN SODIUM,PORCINE/PF 10 UNIT/ML
50 SYRINGE (ML) INTRAVENOUS AS NEEDED
OUTPATIENT
Start: 2025-06-20

## 2025-06-20 RX ADMIN — GEMCITABINE 2021.6 MG: 38 INJECTION, SOLUTION INTRAVENOUS at 11:37

## 2025-06-20 RX ADMIN — PACLITAXEL 255 MG: 100 INJECTION, POWDER, LYOPHILIZED, FOR SUSPENSION INTRAVENOUS at 10:59

## 2025-06-20 RX ADMIN — ONDANSETRON 8 MG: 2 INJECTION INTRAMUSCULAR; INTRAVENOUS at 10:28

## 2025-06-20 ASSESSMENT — PAIN SCALES - GENERAL: PAINLEVEL_OUTOF10: 0-NO PAIN

## 2025-06-26 ENCOUNTER — LAB (OUTPATIENT)
Dept: LAB | Facility: CLINIC | Age: 60
End: 2025-06-26
Payer: COMMERCIAL

## 2025-06-26 ENCOUNTER — APPOINTMENT (OUTPATIENT)
Dept: HEMATOLOGY/ONCOLOGY | Facility: CLINIC | Age: 60
End: 2025-06-26
Payer: COMMERCIAL

## 2025-06-26 DIAGNOSIS — C25.9 ADENOCARCINOMA OF PANCREAS (MULTI): ICD-10-CM

## 2025-06-26 LAB
ALBUMIN SERPL BCP-MCNC: 3.3 G/DL (ref 3.4–5)
ALP SERPL-CCNC: 72 U/L (ref 33–136)
ALT SERPL W P-5'-P-CCNC: 45 U/L (ref 10–52)
ANION GAP SERPL CALC-SCNC: 12 MMOL/L (ref 10–20)
AST SERPL W P-5'-P-CCNC: 30 U/L (ref 9–39)
BASOPHILS # BLD AUTO: 0.02 X10*3/UL (ref 0–0.1)
BASOPHILS NFR BLD AUTO: 0.4 %
BILIRUB SERPL-MCNC: 0.4 MG/DL (ref 0–1.2)
BUN SERPL-MCNC: 13 MG/DL (ref 6–23)
CALCIUM SERPL-MCNC: 8.5 MG/DL (ref 8.6–10.3)
CHLORIDE SERPL-SCNC: 108 MMOL/L (ref 98–107)
CO2 SERPL-SCNC: 23 MMOL/L (ref 21–32)
CREAT SERPL-MCNC: 0.7 MG/DL (ref 0.5–1.3)
EGFRCR SERPLBLD CKD-EPI 2021: >90 ML/MIN/1.73M*2
EOSINOPHIL # BLD AUTO: 0.08 X10*3/UL (ref 0–0.7)
EOSINOPHIL NFR BLD AUTO: 1.6 %
ERYTHROCYTE [DISTWIDTH] IN BLOOD BY AUTOMATED COUNT: 15.1 % (ref 11.5–14.5)
GLUCOSE SERPL-MCNC: 154 MG/DL (ref 74–99)
HCT VFR BLD AUTO: 32.8 % (ref 41–52)
HGB BLD-MCNC: 10.2 G/DL (ref 13.5–17.5)
IMM GRANULOCYTES # BLD AUTO: 0.02 X10*3/UL (ref 0–0.7)
IMM GRANULOCYTES NFR BLD AUTO: 0.4 % (ref 0–0.9)
LYMPHOCYTES # BLD AUTO: 1.08 X10*3/UL (ref 1.2–4.8)
LYMPHOCYTES NFR BLD AUTO: 21 %
MCH RBC QN AUTO: 31.1 PG (ref 26–34)
MCHC RBC AUTO-ENTMCNC: 31.1 G/DL (ref 32–36)
MCV RBC AUTO: 100 FL (ref 80–100)
MONOCYTES # BLD AUTO: 0.29 X10*3/UL (ref 0.1–1)
MONOCYTES NFR BLD AUTO: 5.6 %
NEUTROPHILS # BLD AUTO: 3.65 X10*3/UL (ref 1.2–7.7)
NEUTROPHILS NFR BLD AUTO: 71 %
PLATELET # BLD AUTO: 142 X10*3/UL (ref 150–450)
POTASSIUM SERPL-SCNC: 3.8 MMOL/L (ref 3.5–5.3)
PROT SERPL-MCNC: 5.3 G/DL (ref 6.4–8.2)
RBC # BLD AUTO: 3.28 X10*6/UL (ref 4.5–5.9)
SODIUM SERPL-SCNC: 139 MMOL/L (ref 136–145)
WBC # BLD AUTO: 5.1 X10*3/UL (ref 4.4–11.3)

## 2025-06-26 PROCEDURE — 80053 COMPREHEN METABOLIC PANEL: CPT

## 2025-06-26 PROCEDURE — 36415 COLL VENOUS BLD VENIPUNCTURE: CPT

## 2025-06-26 PROCEDURE — 85025 COMPLETE CBC W/AUTO DIFF WBC: CPT

## 2025-06-27 ENCOUNTER — INFUSION (OUTPATIENT)
Dept: HEMATOLOGY/ONCOLOGY | Facility: CLINIC | Age: 60
End: 2025-06-27
Payer: COMMERCIAL

## 2025-06-27 VITALS
HEART RATE: 84 BPM | RESPIRATION RATE: 16 BRPM | DIASTOLIC BLOOD PRESSURE: 66 MMHG | TEMPERATURE: 97.2 F | WEIGHT: 199.08 LBS | SYSTOLIC BLOOD PRESSURE: 111 MMHG | BODY MASS INDEX: 29.35 KG/M2 | OXYGEN SATURATION: 98 %

## 2025-06-27 DIAGNOSIS — C25.9 ADENOCARCINOMA OF PANCREAS (MULTI): ICD-10-CM

## 2025-06-27 PROCEDURE — 96413 CHEMO IV INFUSION 1 HR: CPT

## 2025-06-27 PROCEDURE — 2500000004 HC RX 250 GENERAL PHARMACY W/ HCPCS (ALT 636 FOR OP/ED): Performed by: INTERNAL MEDICINE

## 2025-06-27 PROCEDURE — 96417 CHEMO IV INFUS EACH ADDL SEQ: CPT

## 2025-06-27 PROCEDURE — 96375 TX/PRO/DX INJ NEW DRUG ADDON: CPT | Mod: INF

## 2025-06-27 RX ORDER — HEPARIN 100 UNIT/ML
500 SYRINGE INTRAVENOUS AS NEEDED
OUTPATIENT
Start: 2025-06-27

## 2025-06-27 RX ORDER — ONDANSETRON HYDROCHLORIDE 2 MG/ML
8 INJECTION, SOLUTION INTRAVENOUS ONCE
Status: COMPLETED | OUTPATIENT
Start: 2025-06-27 | End: 2025-06-27

## 2025-06-27 RX ORDER — DIPHENHYDRAMINE HYDROCHLORIDE 50 MG/ML
50 INJECTION, SOLUTION INTRAMUSCULAR; INTRAVENOUS AS NEEDED
Status: DISCONTINUED | OUTPATIENT
Start: 2025-06-27 | End: 2025-06-27 | Stop reason: HOSPADM

## 2025-06-27 RX ORDER — FAMOTIDINE 10 MG/ML
20 INJECTION, SOLUTION INTRAVENOUS ONCE AS NEEDED
Status: DISCONTINUED | OUTPATIENT
Start: 2025-06-27 | End: 2025-06-27 | Stop reason: HOSPADM

## 2025-06-27 RX ORDER — HEPARIN SODIUM,PORCINE/PF 10 UNIT/ML
50 SYRINGE (ML) INTRAVENOUS AS NEEDED
OUTPATIENT
Start: 2025-06-27

## 2025-06-27 RX ORDER — ALBUTEROL SULFATE 0.83 MG/ML
3 SOLUTION RESPIRATORY (INHALATION) AS NEEDED
Status: DISCONTINUED | OUTPATIENT
Start: 2025-06-27 | End: 2025-06-27 | Stop reason: HOSPADM

## 2025-06-27 RX ORDER — EPINEPHRINE 0.3 MG/.3ML
0.3 INJECTION SUBCUTANEOUS EVERY 5 MIN PRN
Status: DISCONTINUED | OUTPATIENT
Start: 2025-06-27 | End: 2025-06-27 | Stop reason: HOSPADM

## 2025-06-27 RX ADMIN — GEMCITABINE 2021.6 MG: 38 INJECTION, SOLUTION INTRAVENOUS at 12:41

## 2025-06-27 RX ADMIN — ONDANSETRON 8 MG: 2 INJECTION INTRAMUSCULAR; INTRAVENOUS at 11:03

## 2025-06-27 RX ADMIN — PACLITAXEL 255 MG: 100 INJECTION, POWDER, LYOPHILIZED, FOR SUSPENSION INTRAVENOUS at 11:55

## 2025-06-27 ASSESSMENT — PAIN SCALES - GENERAL: PAINLEVEL_OUTOF10: 0-NO PAIN

## 2025-07-10 ENCOUNTER — APPOINTMENT (OUTPATIENT)
Dept: HEMATOLOGY/ONCOLOGY | Facility: CLINIC | Age: 60
End: 2025-07-10
Payer: COMMERCIAL

## 2025-07-10 ENCOUNTER — LAB (OUTPATIENT)
Dept: LAB | Facility: CLINIC | Age: 60
End: 2025-07-10
Payer: COMMERCIAL

## 2025-07-10 DIAGNOSIS — C25.9 ADENOCARCINOMA OF PANCREAS (MULTI): ICD-10-CM

## 2025-07-10 LAB
ALBUMIN SERPL BCP-MCNC: 3.1 G/DL (ref 3.4–5)
ALP SERPL-CCNC: 78 U/L (ref 33–136)
ALT SERPL W P-5'-P-CCNC: 18 U/L (ref 10–52)
ANION GAP SERPL CALC-SCNC: 11 MMOL/L (ref 10–20)
AST SERPL W P-5'-P-CCNC: 18 U/L (ref 9–39)
BASOPHILS # BLD AUTO: 0.02 X10*3/UL (ref 0–0.1)
BASOPHILS NFR BLD AUTO: 0.2 %
BILIRUB SERPL-MCNC: 0.5 MG/DL (ref 0–1.2)
BUN SERPL-MCNC: 10 MG/DL (ref 6–23)
CALCIUM SERPL-MCNC: 8.3 MG/DL (ref 8.6–10.3)
CHLORIDE SERPL-SCNC: 106 MMOL/L (ref 98–107)
CO2 SERPL-SCNC: 25 MMOL/L (ref 21–32)
CREAT SERPL-MCNC: 0.69 MG/DL (ref 0.5–1.3)
EGFRCR SERPLBLD CKD-EPI 2021: >90 ML/MIN/1.73M*2
EOSINOPHIL # BLD AUTO: 0.25 X10*3/UL (ref 0–0.7)
EOSINOPHIL NFR BLD AUTO: 3 %
ERYTHROCYTE [DISTWIDTH] IN BLOOD BY AUTOMATED COUNT: 16.2 % (ref 11.5–14.5)
GLUCOSE SERPL-MCNC: 109 MG/DL (ref 74–99)
HCT VFR BLD AUTO: 33.5 % (ref 41–52)
HGB BLD-MCNC: 10.4 G/DL (ref 13.5–17.5)
IMM GRANULOCYTES # BLD AUTO: 0.06 X10*3/UL (ref 0–0.7)
IMM GRANULOCYTES NFR BLD AUTO: 0.7 % (ref 0–0.9)
LYMPHOCYTES # BLD AUTO: 1.96 X10*3/UL (ref 1.2–4.8)
LYMPHOCYTES NFR BLD AUTO: 23.3 %
MCH RBC QN AUTO: 30.4 PG (ref 26–34)
MCHC RBC AUTO-ENTMCNC: 31 G/DL (ref 32–36)
MCV RBC AUTO: 98 FL (ref 80–100)
MONOCYTES # BLD AUTO: 1.34 X10*3/UL (ref 0.1–1)
MONOCYTES NFR BLD AUTO: 15.9 %
NEUTROPHILS # BLD AUTO: 4.8 X10*3/UL (ref 1.2–7.7)
NEUTROPHILS NFR BLD AUTO: 56.9 %
NRBC BLD-RTO: ABNORMAL /100{WBCS}
PLATELET # BLD AUTO: 320 X10*3/UL (ref 150–450)
POTASSIUM SERPL-SCNC: 3.3 MMOL/L (ref 3.5–5.3)
PROT SERPL-MCNC: 5 G/DL (ref 6.4–8.2)
RBC # BLD AUTO: 3.42 X10*6/UL (ref 4.5–5.9)
SODIUM SERPL-SCNC: 139 MMOL/L (ref 136–145)
WBC # BLD AUTO: 8.4 X10*3/UL (ref 4.4–11.3)

## 2025-07-10 PROCEDURE — 84075 ASSAY ALKALINE PHOSPHATASE: CPT

## 2025-07-10 PROCEDURE — 85025 COMPLETE CBC W/AUTO DIFF WBC: CPT

## 2025-07-10 PROCEDURE — 36415 COLL VENOUS BLD VENIPUNCTURE: CPT

## 2025-07-10 NOTE — PROGRESS NOTES
Patient ID: Hardy Iqbal is a 59 y.o. male.    Oncology History   Adenocarcinoma of pancreas (Multi)   2/3/2025 Initial Diagnosis    Adenocarcinoma of pancreas (Multi)     2/18/2025 - 4/3/2025 Chemotherapy    mFOLFIRINOX (Fluorouracil Continuous Infusion / Leucovorin / Irinotecan / Oxaliplatin), 14 Day Cycles     4/17/2025 -  Chemotherapy    Albumin-bound PACLitaxel / Gemcitabine (Day 1, 8, and 15), 28 Day Cycles     7/22/2025 - 7/22/2025 Chemotherapy    mFOLFIRINOX (Fluorouracil Continuous Infusion / Leucovorin / Irinotecan / Oxaliplatin), 14 Day Cycles           Subjective    HPI  Mr. Sathish Iqbal is a 59 y.o. male presenting for follow up of pancreatic cancer. Counts look ok today with mild anemia; chemistry panel unremarkable.    He does have some lower limb swelling which he attributes to his job as a . He did not notice any increased urination while on Lasix. He denies fever, chest pain, cough, diarrhea, nausea, vomiting.    Patient's past medical history, surgical history, family history and social history reviewed.    Review of Systems:   Review of Systems:    Positive per HPI, otherwise negative.       Objective    BSA: 2.12 meters squared  /58   Pulse 83   Temp 35.9 °C (96.6 °F) (Temporal)   Resp 16   Wt 92.4 kg (203 lb 11.3 oz)   SpO2 95%   BMI 30.03 kg/m²        Physical Exam  Gen: appears well in clinic, NAD  HEENT: atraumatic head, normocephalic, EOMI, conjunctiva normal  LUNG: no increased WOB, CTAB  CV: No JVD. RRR  GI: soft, NT, ND  LE: no LE edema on today's exam  Skin: no obvious rashes or lesions on visible skin  Neuro: interactive, no focal deficits noted  Psych: normal mood and affect    Performance Status:  Symptomatic; fully ambulatory    Labs/Imaging/Pathology: Personally reviewed reports and images in Epic electronic medical record system. Pertinent results as it related to the plan represented in below in assessment and plan.     Lab Results   Component Value  Date    WBC 8.4 07/10/2025    NEUTROABS 4.80 07/10/2025    IGABSOL 0.06 07/10/2025    LYMPHSABS 1.96 07/10/2025    MONOSABS 1.34 (H) 07/10/2025    EOSABS 0.25 07/10/2025    BASOSABS 0.02 07/10/2025    RBC 3.42 (L) 07/10/2025    MCV 98 07/10/2025    MCHC 31.0 (L) 07/10/2025    HGB 10.4 (L) 07/10/2025    HCT 33.5 (L) 07/10/2025     07/10/2025      Lab Results   Component Value Date    CREATININE 0.69 07/10/2025    BUN 10 07/10/2025     07/10/2025    K 3.3 (L) 07/10/2025     07/10/2025    CO2 25 07/10/2025     Lab Results   Component Value Date    ALT 18 07/10/2025    AST 18 07/10/2025    ALKPHOS 78 07/10/2025    BILITOT 0.5 07/10/2025         Assessment/Plan   Invasive adenocarcinoma of pancreas, MSI-S, +KRAS p.G12D, TP53 p.R342  - Initial CA 19-9 less than 4   - Patient was initially presented to the emergency room on 1/2/25 for worsening abdominal pain.   - CT abdomen pelvis at that time revealed a defined mass involving the uncinate process of the pancreas  - biopsy on 1/29/25, which showed invasive adenocarcinoma poorly differentiated. MSI status intact  - CT of the chest on 1/24/25 showed no clear evidence of metastases  - met with Dr. Castro on 1/23/25 and presents to discuss neoadjuvant  chemotherapy today  - patient has a good PS and discussed FOLFIRINOS with 5-FU, oxaliplatin, and irinotecan  - 2/18/25 C1D1  - one variant of UTG1A1 gene and given diarrhea will dose reduce iriniotecan by 25% for C2  - 4/11/25 CT c/a/p with progression of pancreatic mass and new liver lesions    4/14/25:  -Today we reviewed at length recent CT of the chest abdomen pelvis and reviewed images together, which does show increase in size of the pancreatic mass and new liver lesions concerning for metastases  -Reviewed this would not be considered stage IV advanced pancreatic cancer and goal for systemic treatment would be for palliative intent  -Discussed I do want to switch his systemic chemotherapy to  gemcitabine plus Abraxane, reviewed side effects and consent signed  - repeat Guardant 360 and initial signatera today  -Prior Tempus did reveal a KRAS mutation and we could consider clinical trial at progression  - will plan to start C1 on 4/17/35  - michelle short term CT after 2 cycles  - Patient would like to discuss with radiation though they understand that would not be indicated until we can get better control systemically, referral placed to Dr. Celestin    4/24/25:  - Proceed with Gemcitabine + Abraxane C1 D8 today   - Treatment is Day 1, 8, 15; repeated every 28 days.   - CT scan after Cycle 2   - Reschedule XRT consultation  - Ok to proceed today, will monitor neutropenia closely     5/16/25:   - Patient did not have any increased toxicity with first cycle   - He denies neuropathy or GI toxicity   - He does have thrush, Nystatin refilled   - We discussed we will do repeat Signatera today for closer time frame to assess response.   - CT CAP before cycle 3   - No other changes at this time   - Of note, patient did get a second opinion with Dr. Anderson and is aware there are clinical trials at progression   - RTC with me for next cycle, he will see Rolanda in between    6/13/25:  - Reviewed CT scan which show treatment response in the liver and the primary pancreatic mass. Unclear why new attenuation in liver, will monitor  - Signaterahas also decreased, consistent with response.    - Lower extremity edema and weight gain since last visit and lasix prescribed to be taken as needed.  Advised compression stockings and ambulation.   - We discussed at this time we will continue with current schedule gemcitabine plus abraxane D1, D8, D15 every 28 days, if new toxicity they are aware we can drop D8 given palliative intent, for now since no new toxicity wish to stay aggressive  - Reviewed this is a high risk therapy for an illness that poses threat to life. Labs, exam, and history are appropriate for treatment  - Return  to clinic with me for next cycle.    7/11/25:   - Labs reviewed, no contraindications to proceed with treatment today   - His largest complaints appears to be lower limb swelling   - We discussed the importance of protein intake   - We also discussed increasing Lasix to 40 mg and will give him Lasix 40 mg IV today   - We discussed starting dexamethasone 8 mg which may also help with lower limb swelling   - He will take dexamethasone for 3 days, if there is no relief he will stop. If there is relief, will taper, which was provided today  - Reviewed this is a high risk therapy for an illness that poses threat to life. Labs, exam, and history are appropriate for treatment   - RTC for D8 and D15, then with me for the next cycle      Reviewed ongoing medical problems and how they relate to his malignancy, will continue long term monitoring.      RTC for D8 and D15, then with me for the next cycle  This note has been transcribed using a medical scribe and there is a possibility of unintentional typing misprints    Diagnoses and all orders for this visit:  Adenocarcinoma of pancreas (Multi)  -     Clinic Appointment Request  -     dexAMETHasone (Decadron) 2 mg tablet; Take 4 tablets (8 mg) by mouth once daily for 10 days.  -     Clinic Appointment Request; Future  -     Infusion Appointment Request; Future  -     CBC and Auto Differential; Future  -     Comprehensive metabolic panel; Future  -     Infusion Appointment Request; Future  -     CBC and Auto Differential; Future  -     Comprehensive metabolic panel; Future  -     Infusion Appointment Request; Future  -     CBC and Auto Differential; Future  -     Comprehensive metabolic panel; Future  -     CT chest abdomen pelvis w and wo IV contrast; Future  Lower extremity edema  -     furosemide (Lasix) 20 mg tablet; Take 2 tablets (40 mg) by mouth once daily as needed (LE edema secondary to fluid retention).  -     dexAMETHasone (Decadron) 2 mg tablet; Take 4 tablets (8  mg) by mouth once daily for 10 days.  Other orders  -     ondansetron (Zofran) injection 8 mg  -     prochlorperazine (Compazine) tablet 10 mg  -     prochlorperazine (Compazine) injection 10 mg  -     albumin-bound PACLitaxel 255 mg IV (Abraxane) 65 mL  -     gemcitabine (Gemzar) 2,021.6 mg in sodium chloride 0.9% 303.2 mL IV  -     sodium chloride 0.9 % bolus 500 mL  -     dextrose 5 % in water (D5W) bolus 500 mL  -     diphenhydrAMINE (BENADryl) injection 50 mg  -     methylPREDNISolone sod succinate (SOLU-Medrol) 40 mg/mL injection 40 mg  -     famotidine PF (Pepcid) injection 20 mg  -     EPINEPHrine (Epipen) injection syringe 0.3 mg  -     albuterol 2.5 mg /3 mL (0.083 %) nebulizer solution 3 mL  -     ondansetron (Zofran) injection 8 mg  -     prochlorperazine (Compazine) tablet 10 mg  -     prochlorperazine (Compazine) injection 10 mg  -     albumin-bound PACLitaxel 255 mg IV (Abraxane) 65 mL  -     gemcitabine (Gemzar) 2,021.6 mg in sodium chloride 0.9% 303.2 mL IV  -     sodium chloride 0.9 % bolus 500 mL  -     dextrose 5 % in water (D5W) bolus 500 mL  -     diphenhydrAMINE (BENADryl) injection 50 mg  -     methylPREDNISolone sod succinate (SOLU-Medrol) 40 mg/mL injection 40 mg  -     famotidine PF (Pepcid) injection 20 mg  -     EPINEPHrine (Epipen) injection syringe 0.3 mg  -     albuterol 2.5 mg /3 mL (0.083 %) nebulizer solution 3 mL  -     ondansetron (Zofran) injection 8 mg  -     prochlorperazine (Compazine) tablet 10 mg  -     prochlorperazine (Compazine) injection 10 mg  -     albumin-bound PACLitaxel 255 mg IV (Abraxane) 65 mL  -     gemcitabine (Gemzar) 2,021.6 mg in sodium chloride 0.9% 303.2 mL IV  -     sodium chloride 0.9 % bolus 500 mL  -     dextrose 5 % in water (D5W) bolus 500 mL  -     diphenhydrAMINE (BENADryl) injection 50 mg  -     methylPREDNISolone sod succinate (SOLU-Medrol) 40 mg/mL injection 40 mg  -     famotidine PF (Pepcid) injection 20 mg  -     EPINEPHrine (Epipen)  injection syringe 0.3 mg  -     albuterol 2.5 mg /3 mL (0.083 %) nebulizer solution 3 mL      Ashley Mesa MD  Hematology/Oncology  [facility]      Scribe Attestation  By signing my name below, Sanaz MONTES Scribe, attest that this documentation has been prepared under the direction and in the presence of Ashley Mesa MD.    Provider Attestation  Ashley MONTES MD, personally performed the services described in the documentation as scribed by Sanaz Mcbride, in my presence, and confirm it is both accurate and complete.

## 2025-07-11 ENCOUNTER — OFFICE VISIT (OUTPATIENT)
Dept: HEMATOLOGY/ONCOLOGY | Facility: CLINIC | Age: 60
End: 2025-07-11
Payer: COMMERCIAL

## 2025-07-11 ENCOUNTER — INFUSION (OUTPATIENT)
Dept: HEMATOLOGY/ONCOLOGY | Facility: CLINIC | Age: 60
End: 2025-07-11
Payer: COMMERCIAL

## 2025-07-11 VITALS
SYSTOLIC BLOOD PRESSURE: 103 MMHG | TEMPERATURE: 96.6 F | BODY MASS INDEX: 30.03 KG/M2 | WEIGHT: 203.71 LBS | DIASTOLIC BLOOD PRESSURE: 58 MMHG | OXYGEN SATURATION: 95 % | RESPIRATION RATE: 16 BRPM | HEART RATE: 83 BPM

## 2025-07-11 DIAGNOSIS — C25.9 ADENOCARCINOMA OF PANCREAS (MULTI): Primary | ICD-10-CM

## 2025-07-11 DIAGNOSIS — R60.0 LOWER EXTREMITY EDEMA: ICD-10-CM

## 2025-07-11 DIAGNOSIS — C25.9 ADENOCARCINOMA OF PANCREAS (MULTI): ICD-10-CM

## 2025-07-11 PROCEDURE — 96375 TX/PRO/DX INJ NEW DRUG ADDON: CPT | Mod: INF

## 2025-07-11 PROCEDURE — 2500000004 HC RX 250 GENERAL PHARMACY W/ HCPCS (ALT 636 FOR OP/ED): Performed by: INTERNAL MEDICINE

## 2025-07-11 PROCEDURE — 99215 OFFICE O/P EST HI 40 MIN: CPT | Performed by: INTERNAL MEDICINE

## 2025-07-11 PROCEDURE — 99215 OFFICE O/P EST HI 40 MIN: CPT | Mod: 25 | Performed by: INTERNAL MEDICINE

## 2025-07-11 PROCEDURE — 3078F DIAST BP <80 MM HG: CPT | Performed by: INTERNAL MEDICINE

## 2025-07-11 PROCEDURE — 96417 CHEMO IV INFUS EACH ADDL SEQ: CPT

## 2025-07-11 PROCEDURE — 96413 CHEMO IV INFUSION 1 HR: CPT

## 2025-07-11 PROCEDURE — 3074F SYST BP LT 130 MM HG: CPT | Performed by: INTERNAL MEDICINE

## 2025-07-11 RX ORDER — EPINEPHRINE 0.3 MG/.3ML
0.3 INJECTION SUBCUTANEOUS EVERY 5 MIN PRN
OUTPATIENT
Start: 2025-09-26

## 2025-07-11 RX ORDER — PROCHLORPERAZINE EDISYLATE 5 MG/ML
10 INJECTION INTRAMUSCULAR; INTRAVENOUS EVERY 6 HOURS PRN
OUTPATIENT
Start: 2025-09-19

## 2025-07-11 RX ORDER — EPINEPHRINE 0.3 MG/.3ML
0.3 INJECTION SUBCUTANEOUS EVERY 5 MIN PRN
OUTPATIENT
Start: 2025-10-03

## 2025-07-11 RX ORDER — PROCHLORPERAZINE MALEATE 10 MG
10 TABLET ORAL EVERY 6 HOURS PRN
OUTPATIENT
Start: 2025-09-19

## 2025-07-11 RX ORDER — EPINEPHRINE 0.3 MG/.3ML
0.3 INJECTION SUBCUTANEOUS EVERY 5 MIN PRN
OUTPATIENT
Start: 2025-09-19

## 2025-07-11 RX ORDER — FAMOTIDINE 10 MG/ML
20 INJECTION, SOLUTION INTRAVENOUS ONCE AS NEEDED
OUTPATIENT
Start: 2025-09-26

## 2025-07-11 RX ORDER — PROCHLORPERAZINE MALEATE 10 MG
10 TABLET ORAL EVERY 6 HOURS PRN
Status: DISCONTINUED | OUTPATIENT
Start: 2025-07-11 | End: 2025-07-11 | Stop reason: HOSPADM

## 2025-07-11 RX ORDER — HEPARIN 100 UNIT/ML
500 SYRINGE INTRAVENOUS AS NEEDED
OUTPATIENT
Start: 2025-07-11

## 2025-07-11 RX ORDER — DEXAMETHASONE 2 MG/1
8 TABLET ORAL DAILY
Qty: 40 TABLET | Refills: 0 | Status: SHIPPED | OUTPATIENT
Start: 2025-07-11 | End: 2025-07-21

## 2025-07-11 RX ORDER — ONDANSETRON HYDROCHLORIDE 2 MG/ML
8 INJECTION, SOLUTION INTRAVENOUS ONCE
OUTPATIENT
Start: 2025-09-19

## 2025-07-11 RX ORDER — DIPHENHYDRAMINE HYDROCHLORIDE 50 MG/ML
50 INJECTION, SOLUTION INTRAMUSCULAR; INTRAVENOUS AS NEEDED
Status: DISCONTINUED | OUTPATIENT
Start: 2025-07-11 | End: 2025-07-11 | Stop reason: HOSPADM

## 2025-07-11 RX ORDER — ONDANSETRON HYDROCHLORIDE 2 MG/ML
8 INJECTION, SOLUTION INTRAVENOUS ONCE
Status: COMPLETED | OUTPATIENT
Start: 2025-07-11 | End: 2025-07-11

## 2025-07-11 RX ORDER — DIPHENHYDRAMINE HYDROCHLORIDE 50 MG/ML
50 INJECTION, SOLUTION INTRAMUSCULAR; INTRAVENOUS AS NEEDED
OUTPATIENT
Start: 2025-09-26

## 2025-07-11 RX ORDER — PROCHLORPERAZINE MALEATE 10 MG
10 TABLET ORAL EVERY 6 HOURS PRN
OUTPATIENT
Start: 2025-09-26

## 2025-07-11 RX ORDER — ALBUTEROL SULFATE 0.83 MG/ML
3 SOLUTION RESPIRATORY (INHALATION) AS NEEDED
Status: DISCONTINUED | OUTPATIENT
Start: 2025-07-11 | End: 2025-07-11 | Stop reason: HOSPADM

## 2025-07-11 RX ORDER — FAMOTIDINE 10 MG/ML
20 INJECTION, SOLUTION INTRAVENOUS ONCE AS NEEDED
OUTPATIENT
Start: 2025-10-03

## 2025-07-11 RX ORDER — FUROSEMIDE 10 MG/ML
40 INJECTION INTRAMUSCULAR; INTRAVENOUS ONCE
Status: COMPLETED | OUTPATIENT
Start: 2025-07-11 | End: 2025-07-11

## 2025-07-11 RX ORDER — DIPHENHYDRAMINE HYDROCHLORIDE 50 MG/ML
50 INJECTION, SOLUTION INTRAMUSCULAR; INTRAVENOUS AS NEEDED
OUTPATIENT
Start: 2025-10-03

## 2025-07-11 RX ORDER — FUROSEMIDE 20 MG/1
40 TABLET ORAL DAILY PRN
Qty: 15 TABLET | Refills: 11 | Status: SHIPPED | OUTPATIENT
Start: 2025-07-11 | End: 2026-07-11

## 2025-07-11 RX ORDER — ONDANSETRON HYDROCHLORIDE 2 MG/ML
8 INJECTION, SOLUTION INTRAVENOUS ONCE
OUTPATIENT
Start: 2025-09-26

## 2025-07-11 RX ORDER — EPINEPHRINE 0.3 MG/.3ML
0.3 INJECTION SUBCUTANEOUS EVERY 5 MIN PRN
Status: DISCONTINUED | OUTPATIENT
Start: 2025-07-11 | End: 2025-07-11 | Stop reason: HOSPADM

## 2025-07-11 RX ORDER — FUROSEMIDE 10 MG/ML
40 INJECTION INTRAMUSCULAR; INTRAVENOUS ONCE
Status: CANCELLED | OUTPATIENT
Start: 2025-07-11 | End: 2025-07-11

## 2025-07-11 RX ORDER — PROCHLORPERAZINE MALEATE 10 MG
10 TABLET ORAL EVERY 6 HOURS PRN
OUTPATIENT
Start: 2025-10-03

## 2025-07-11 RX ORDER — PROCHLORPERAZINE EDISYLATE 5 MG/ML
10 INJECTION INTRAMUSCULAR; INTRAVENOUS EVERY 6 HOURS PRN
Status: DISCONTINUED | OUTPATIENT
Start: 2025-07-11 | End: 2025-07-11 | Stop reason: HOSPADM

## 2025-07-11 RX ORDER — HEPARIN 100 UNIT/ML
500 SYRINGE INTRAVENOUS AS NEEDED
Status: DISCONTINUED | OUTPATIENT
Start: 2025-07-11 | End: 2025-07-11 | Stop reason: HOSPADM

## 2025-07-11 RX ORDER — HEPARIN SODIUM,PORCINE/PF 10 UNIT/ML
50 SYRINGE (ML) INTRAVENOUS AS NEEDED
OUTPATIENT
Start: 2025-07-11

## 2025-07-11 RX ORDER — ALBUTEROL SULFATE 0.83 MG/ML
3 SOLUTION RESPIRATORY (INHALATION) AS NEEDED
OUTPATIENT
Start: 2025-10-03

## 2025-07-11 RX ORDER — FAMOTIDINE 10 MG/ML
20 INJECTION, SOLUTION INTRAVENOUS ONCE AS NEEDED
Status: DISCONTINUED | OUTPATIENT
Start: 2025-07-11 | End: 2025-07-11 | Stop reason: HOSPADM

## 2025-07-11 RX ORDER — PROCHLORPERAZINE EDISYLATE 5 MG/ML
10 INJECTION INTRAMUSCULAR; INTRAVENOUS EVERY 6 HOURS PRN
OUTPATIENT
Start: 2025-10-03

## 2025-07-11 RX ORDER — FAMOTIDINE 10 MG/ML
20 INJECTION, SOLUTION INTRAVENOUS ONCE AS NEEDED
OUTPATIENT
Start: 2025-09-19

## 2025-07-11 RX ORDER — DIPHENHYDRAMINE HYDROCHLORIDE 50 MG/ML
50 INJECTION, SOLUTION INTRAMUSCULAR; INTRAVENOUS AS NEEDED
OUTPATIENT
Start: 2025-09-19

## 2025-07-11 RX ORDER — HEPARIN SODIUM,PORCINE/PF 10 UNIT/ML
50 SYRINGE (ML) INTRAVENOUS AS NEEDED
Status: DISCONTINUED | OUTPATIENT
Start: 2025-07-11 | End: 2025-07-11 | Stop reason: HOSPADM

## 2025-07-11 RX ORDER — ONDANSETRON HYDROCHLORIDE 2 MG/ML
8 INJECTION, SOLUTION INTRAVENOUS ONCE
OUTPATIENT
Start: 2025-10-03

## 2025-07-11 RX ORDER — ALBUTEROL SULFATE 0.83 MG/ML
3 SOLUTION RESPIRATORY (INHALATION) AS NEEDED
OUTPATIENT
Start: 2025-09-19

## 2025-07-11 RX ORDER — ALBUTEROL SULFATE 0.83 MG/ML
3 SOLUTION RESPIRATORY (INHALATION) AS NEEDED
OUTPATIENT
Start: 2025-09-26

## 2025-07-11 RX ORDER — PROCHLORPERAZINE EDISYLATE 5 MG/ML
10 INJECTION INTRAMUSCULAR; INTRAVENOUS EVERY 6 HOURS PRN
OUTPATIENT
Start: 2025-09-26

## 2025-07-11 RX ADMIN — PACLITAXEL 255 MG: 100 INJECTION, POWDER, LYOPHILIZED, FOR SUSPENSION INTRAVENOUS at 11:32

## 2025-07-11 RX ADMIN — FUROSEMIDE 40 MG: 10 INJECTION, SOLUTION INTRAMUSCULAR; INTRAVENOUS at 11:30

## 2025-07-11 RX ADMIN — ONDANSETRON 8 MG: 2 INJECTION INTRAMUSCULAR; INTRAVENOUS at 10:43

## 2025-07-11 RX ADMIN — GEMCITABINE 2021.6 MG: 38 INJECTION, SOLUTION INTRAVENOUS at 12:26

## 2025-07-11 ASSESSMENT — PAIN SCALES - GENERAL: PAINLEVEL_OUTOF10: 0-NO PAIN

## 2025-07-11 NOTE — SIGNIFICANT EVENT

## 2025-07-11 NOTE — PATIENT INSTRUCTIONS
Pt here for C4D1 abraxane/gemcitabine. Tolerated infusions today without incident. RTC next week for D8.

## 2025-07-17 ENCOUNTER — LAB (OUTPATIENT)
Dept: LAB | Facility: CLINIC | Age: 60
End: 2025-07-17
Payer: COMMERCIAL

## 2025-07-17 ENCOUNTER — APPOINTMENT (OUTPATIENT)
Dept: HEMATOLOGY/ONCOLOGY | Facility: CLINIC | Age: 60
End: 2025-07-17
Payer: COMMERCIAL

## 2025-07-17 DIAGNOSIS — C25.9 ADENOCARCINOMA OF PANCREAS (MULTI): ICD-10-CM

## 2025-07-17 LAB
ALBUMIN SERPL BCP-MCNC: 3.3 G/DL (ref 3.4–5)
ALP SERPL-CCNC: 65 U/L (ref 33–136)
ALT SERPL W P-5'-P-CCNC: 29 U/L (ref 10–52)
ANION GAP SERPL CALC-SCNC: 9 MMOL/L (ref 10–20)
AST SERPL W P-5'-P-CCNC: 22 U/L (ref 9–39)
BASOPHILS # BLD AUTO: 0 X10*3/UL (ref 0–0.1)
BASOPHILS NFR BLD AUTO: 0 %
BILIRUB SERPL-MCNC: 0.4 MG/DL (ref 0–1.2)
BUN SERPL-MCNC: 21 MG/DL (ref 6–23)
CALCIUM SERPL-MCNC: 8.2 MG/DL (ref 8.6–10.3)
CHLORIDE SERPL-SCNC: 105 MMOL/L (ref 98–107)
CO2 SERPL-SCNC: 26 MMOL/L (ref 21–32)
CREAT SERPL-MCNC: 0.58 MG/DL (ref 0.5–1.3)
EGFRCR SERPLBLD CKD-EPI 2021: >90 ML/MIN/1.73M*2
EOSINOPHIL # BLD AUTO: 0 X10*3/UL (ref 0–0.7)
EOSINOPHIL NFR BLD AUTO: 0 %
ERYTHROCYTE [DISTWIDTH] IN BLOOD BY AUTOMATED COUNT: 16.2 % (ref 11.5–14.5)
GLUCOSE SERPL-MCNC: 94 MG/DL (ref 74–99)
HCT VFR BLD AUTO: 32.6 % (ref 41–52)
HGB BLD-MCNC: 10 G/DL (ref 13.5–17.5)
IMM GRANULOCYTES # BLD AUTO: 0.32 X10*3/UL (ref 0–0.7)
IMM GRANULOCYTES NFR BLD AUTO: 2.8 % (ref 0–0.9)
LYMPHOCYTES # BLD AUTO: 2.28 X10*3/UL (ref 1.2–4.8)
LYMPHOCYTES NFR BLD AUTO: 20.1 %
MCH RBC QN AUTO: 30.1 PG (ref 26–34)
MCHC RBC AUTO-ENTMCNC: 30.7 G/DL (ref 32–36)
MCV RBC AUTO: 98 FL (ref 80–100)
MONOCYTES # BLD AUTO: 0.84 X10*3/UL (ref 0.1–1)
MONOCYTES NFR BLD AUTO: 7.4 %
NEUTROPHILS # BLD AUTO: 7.91 X10*3/UL (ref 1.2–7.7)
NEUTROPHILS NFR BLD AUTO: 69.7 %
PLATELET # BLD AUTO: 281 X10*3/UL (ref 150–450)
POTASSIUM SERPL-SCNC: 4.5 MMOL/L (ref 3.5–5.3)
PROT SERPL-MCNC: 5.5 G/DL (ref 6.4–8.2)
RBC # BLD AUTO: 3.32 X10*6/UL (ref 4.5–5.9)
SODIUM SERPL-SCNC: 135 MMOL/L (ref 136–145)
WBC # BLD AUTO: 11.4 X10*3/UL (ref 4.4–11.3)

## 2025-07-17 PROCEDURE — 80053 COMPREHEN METABOLIC PANEL: CPT

## 2025-07-17 PROCEDURE — 85025 COMPLETE CBC W/AUTO DIFF WBC: CPT

## 2025-07-17 PROCEDURE — 36415 COLL VENOUS BLD VENIPUNCTURE: CPT

## 2025-07-18 ENCOUNTER — INFUSION (OUTPATIENT)
Dept: HEMATOLOGY/ONCOLOGY | Facility: CLINIC | Age: 60
End: 2025-07-18
Payer: COMMERCIAL

## 2025-07-18 VITALS
RESPIRATION RATE: 16 BRPM | OXYGEN SATURATION: 98 % | WEIGHT: 203.48 LBS | SYSTOLIC BLOOD PRESSURE: 107 MMHG | TEMPERATURE: 97.7 F | DIASTOLIC BLOOD PRESSURE: 62 MMHG | HEART RATE: 71 BPM | BODY MASS INDEX: 30 KG/M2

## 2025-07-18 DIAGNOSIS — B37.0 THRUSH: Primary | ICD-10-CM

## 2025-07-18 DIAGNOSIS — C25.9 ADENOCARCINOMA OF PANCREAS (MULTI): ICD-10-CM

## 2025-07-18 PROCEDURE — 96413 CHEMO IV INFUSION 1 HR: CPT

## 2025-07-18 PROCEDURE — 2500000004 HC RX 250 GENERAL PHARMACY W/ HCPCS (ALT 636 FOR OP/ED): Performed by: INTERNAL MEDICINE

## 2025-07-18 PROCEDURE — 96375 TX/PRO/DX INJ NEW DRUG ADDON: CPT | Mod: INF

## 2025-07-18 PROCEDURE — 96417 CHEMO IV INFUS EACH ADDL SEQ: CPT

## 2025-07-18 RX ORDER — EPINEPHRINE 0.3 MG/.3ML
0.3 INJECTION SUBCUTANEOUS EVERY 5 MIN PRN
Status: DISCONTINUED | OUTPATIENT
Start: 2025-07-18 | End: 2025-07-18 | Stop reason: HOSPADM

## 2025-07-18 RX ORDER — PROCHLORPERAZINE MALEATE 10 MG
10 TABLET ORAL EVERY 6 HOURS PRN
Status: DISCONTINUED | OUTPATIENT
Start: 2025-07-18 | End: 2025-07-18 | Stop reason: HOSPADM

## 2025-07-18 RX ORDER — ALBUTEROL SULFATE 0.83 MG/ML
3 SOLUTION RESPIRATORY (INHALATION) AS NEEDED
Status: DISCONTINUED | OUTPATIENT
Start: 2025-07-18 | End: 2025-07-18 | Stop reason: HOSPADM

## 2025-07-18 RX ORDER — FAMOTIDINE 10 MG/ML
20 INJECTION, SOLUTION INTRAVENOUS ONCE AS NEEDED
Status: DISCONTINUED | OUTPATIENT
Start: 2025-07-18 | End: 2025-07-18 | Stop reason: HOSPADM

## 2025-07-18 RX ORDER — HEPARIN SODIUM,PORCINE/PF 10 UNIT/ML
50 SYRINGE (ML) INTRAVENOUS AS NEEDED
Status: DISCONTINUED | OUTPATIENT
Start: 2025-07-18 | End: 2025-07-18 | Stop reason: HOSPADM

## 2025-07-18 RX ORDER — ONDANSETRON HYDROCHLORIDE 2 MG/ML
8 INJECTION, SOLUTION INTRAVENOUS ONCE
Status: COMPLETED | OUTPATIENT
Start: 2025-07-18 | End: 2025-07-18

## 2025-07-18 RX ORDER — PROCHLORPERAZINE EDISYLATE 5 MG/ML
10 INJECTION INTRAMUSCULAR; INTRAVENOUS EVERY 6 HOURS PRN
Status: DISCONTINUED | OUTPATIENT
Start: 2025-07-18 | End: 2025-07-18 | Stop reason: HOSPADM

## 2025-07-18 RX ORDER — HEPARIN 100 UNIT/ML
500 SYRINGE INTRAVENOUS AS NEEDED
Status: DISCONTINUED | OUTPATIENT
Start: 2025-07-18 | End: 2025-07-18 | Stop reason: HOSPADM

## 2025-07-18 RX ORDER — HEPARIN SODIUM,PORCINE/PF 10 UNIT/ML
50 SYRINGE (ML) INTRAVENOUS AS NEEDED
OUTPATIENT
Start: 2025-07-18

## 2025-07-18 RX ORDER — DIPHENHYDRAMINE HYDROCHLORIDE 50 MG/ML
50 INJECTION, SOLUTION INTRAMUSCULAR; INTRAVENOUS AS NEEDED
Status: DISCONTINUED | OUTPATIENT
Start: 2025-07-18 | End: 2025-07-18 | Stop reason: HOSPADM

## 2025-07-18 RX ORDER — FLUCONAZOLE 200 MG/1
200 TABLET ORAL DAILY
Qty: 14 TABLET | Refills: 0 | Status: SHIPPED | OUTPATIENT
Start: 2025-07-18 | End: 2025-08-01

## 2025-07-18 RX ORDER — HEPARIN 100 UNIT/ML
500 SYRINGE INTRAVENOUS AS NEEDED
OUTPATIENT
Start: 2025-07-18

## 2025-07-18 RX ADMIN — ONDANSETRON 8 MG: 2 INJECTION INTRAMUSCULAR; INTRAVENOUS at 11:36

## 2025-07-18 RX ADMIN — PACLITAXEL 255 MG: 100 INJECTION, POWDER, LYOPHILIZED, FOR SUSPENSION INTRAVENOUS at 11:42

## 2025-07-18 RX ADMIN — GEMCITABINE 2021.6 MG: 38 INJECTION, SOLUTION INTRAVENOUS at 12:28

## 2025-07-18 ASSESSMENT — PAIN SCALES - GENERAL: PAINLEVEL_OUTOF10: 0-NO PAIN

## 2025-07-24 ENCOUNTER — APPOINTMENT (OUTPATIENT)
Dept: HEMATOLOGY/ONCOLOGY | Facility: CLINIC | Age: 60
End: 2025-07-24
Payer: COMMERCIAL

## 2025-07-24 ENCOUNTER — LAB (OUTPATIENT)
Dept: LAB | Facility: CLINIC | Age: 60
End: 2025-07-24
Payer: COMMERCIAL

## 2025-07-24 DIAGNOSIS — C25.9 ADENOCARCINOMA OF PANCREAS (MULTI): ICD-10-CM

## 2025-07-24 LAB
ALBUMIN SERPL BCP-MCNC: 3.3 G/DL (ref 3.4–5)
ALP SERPL-CCNC: 59 U/L (ref 33–136)
ALT SERPL W P-5'-P-CCNC: 38 U/L (ref 10–52)
ANION GAP SERPL CALC-SCNC: 11 MMOL/L (ref 10–20)
AST SERPL W P-5'-P-CCNC: 24 U/L (ref 9–39)
BASOPHILS # BLD AUTO: 0 X10*3/UL (ref 0–0.1)
BASOPHILS NFR BLD AUTO: 0 %
BILIRUB SERPL-MCNC: 0.5 MG/DL (ref 0–1.2)
BUN SERPL-MCNC: 19 MG/DL (ref 6–23)
CALCIUM SERPL-MCNC: 7.9 MG/DL (ref 8.6–10.3)
CHLORIDE SERPL-SCNC: 104 MMOL/L (ref 98–107)
CO2 SERPL-SCNC: 25 MMOL/L (ref 21–32)
CREAT SERPL-MCNC: 0.66 MG/DL (ref 0.5–1.3)
EGFRCR SERPLBLD CKD-EPI 2021: >90 ML/MIN/1.73M*2
EOSINOPHIL # BLD AUTO: 0.04 X10*3/UL (ref 0–0.7)
EOSINOPHIL NFR BLD AUTO: 0.5 %
ERYTHROCYTE [DISTWIDTH] IN BLOOD BY AUTOMATED COUNT: 17.5 % (ref 11.5–14.5)
GLUCOSE SERPL-MCNC: 83 MG/DL (ref 74–99)
HCT VFR BLD AUTO: 32.5 % (ref 41–52)
HGB BLD-MCNC: 10.2 G/DL (ref 13.5–17.5)
IMM GRANULOCYTES # BLD AUTO: 0.09 X10*3/UL (ref 0–0.7)
IMM GRANULOCYTES NFR BLD AUTO: 1.1 % (ref 0–0.9)
LYMPHOCYTES # BLD AUTO: 2.52 X10*3/UL (ref 1.2–4.8)
LYMPHOCYTES NFR BLD AUTO: 29.5 %
MCH RBC QN AUTO: 30.7 PG (ref 26–34)
MCHC RBC AUTO-ENTMCNC: 31.4 G/DL (ref 32–36)
MCV RBC AUTO: 98 FL (ref 80–100)
MONOCYTES # BLD AUTO: 0.57 X10*3/UL (ref 0.1–1)
MONOCYTES NFR BLD AUTO: 6.7 %
NEUTROPHILS # BLD AUTO: 5.31 X10*3/UL (ref 1.2–7.7)
NEUTROPHILS NFR BLD AUTO: 62.2 %
NRBC BLD-RTO: ABNORMAL /100{WBCS}
PLATELET # BLD AUTO: 152 X10*3/UL (ref 150–450)
POTASSIUM SERPL-SCNC: 3.8 MMOL/L (ref 3.5–5.3)
PROT SERPL-MCNC: 5.1 G/DL (ref 6.4–8.2)
RBC # BLD AUTO: 3.32 X10*6/UL (ref 4.5–5.9)
SODIUM SERPL-SCNC: 136 MMOL/L (ref 136–145)
WBC # BLD AUTO: 8.5 X10*3/UL (ref 4.4–11.3)

## 2025-07-24 PROCEDURE — 80053 COMPREHEN METABOLIC PANEL: CPT

## 2025-07-24 PROCEDURE — 85025 COMPLETE CBC W/AUTO DIFF WBC: CPT

## 2025-07-24 PROCEDURE — 36415 COLL VENOUS BLD VENIPUNCTURE: CPT

## 2025-07-25 ENCOUNTER — INFUSION (OUTPATIENT)
Dept: HEMATOLOGY/ONCOLOGY | Facility: CLINIC | Age: 60
End: 2025-07-25
Payer: COMMERCIAL

## 2025-07-25 ENCOUNTER — TELEPHONE (OUTPATIENT)
Dept: HEMATOLOGY/ONCOLOGY | Facility: CLINIC | Age: 60
End: 2025-07-25

## 2025-07-25 VITALS
WEIGHT: 204.48 LBS | SYSTOLIC BLOOD PRESSURE: 97 MMHG | RESPIRATION RATE: 17 BRPM | HEART RATE: 76 BPM | OXYGEN SATURATION: 99 % | DIASTOLIC BLOOD PRESSURE: 49 MMHG | BODY MASS INDEX: 30.15 KG/M2 | TEMPERATURE: 96.6 F

## 2025-07-25 DIAGNOSIS — C25.9 ADENOCARCINOMA OF PANCREAS (MULTI): ICD-10-CM

## 2025-07-25 PROCEDURE — 96375 TX/PRO/DX INJ NEW DRUG ADDON: CPT | Mod: INF

## 2025-07-25 PROCEDURE — 2500000004 HC RX 250 GENERAL PHARMACY W/ HCPCS (ALT 636 FOR OP/ED): Performed by: INTERNAL MEDICINE

## 2025-07-25 PROCEDURE — 96413 CHEMO IV INFUSION 1 HR: CPT

## 2025-07-25 PROCEDURE — 96417 CHEMO IV INFUS EACH ADDL SEQ: CPT

## 2025-07-25 RX ORDER — HEPARIN SODIUM,PORCINE/PF 10 UNIT/ML
50 SYRINGE (ML) INTRAVENOUS AS NEEDED
OUTPATIENT
Start: 2025-07-25

## 2025-07-25 RX ORDER — ALBUTEROL SULFATE 0.83 MG/ML
3 SOLUTION RESPIRATORY (INHALATION) AS NEEDED
Status: DISCONTINUED | OUTPATIENT
Start: 2025-07-25 | End: 2025-07-25 | Stop reason: HOSPADM

## 2025-07-25 RX ORDER — DIPHENHYDRAMINE HYDROCHLORIDE 50 MG/ML
50 INJECTION, SOLUTION INTRAMUSCULAR; INTRAVENOUS AS NEEDED
Status: DISCONTINUED | OUTPATIENT
Start: 2025-07-25 | End: 2025-07-25 | Stop reason: HOSPADM

## 2025-07-25 RX ORDER — HEPARIN 100 UNIT/ML
500 SYRINGE INTRAVENOUS AS NEEDED
OUTPATIENT
Start: 2025-07-25

## 2025-07-25 RX ORDER — HEPARIN SODIUM,PORCINE/PF 10 UNIT/ML
50 SYRINGE (ML) INTRAVENOUS AS NEEDED
Status: DISCONTINUED | OUTPATIENT
Start: 2025-07-25 | End: 2025-07-25 | Stop reason: HOSPADM

## 2025-07-25 RX ORDER — ONDANSETRON HYDROCHLORIDE 2 MG/ML
8 INJECTION, SOLUTION INTRAVENOUS ONCE
Status: COMPLETED | OUTPATIENT
Start: 2025-07-25 | End: 2025-07-25

## 2025-07-25 RX ORDER — HEPARIN 100 UNIT/ML
500 SYRINGE INTRAVENOUS AS NEEDED
Status: DISCONTINUED | OUTPATIENT
Start: 2025-07-25 | End: 2025-07-25 | Stop reason: HOSPADM

## 2025-07-25 RX ORDER — EPINEPHRINE 0.3 MG/.3ML
0.3 INJECTION SUBCUTANEOUS EVERY 5 MIN PRN
Status: DISCONTINUED | OUTPATIENT
Start: 2025-07-25 | End: 2025-07-25 | Stop reason: HOSPADM

## 2025-07-25 RX ORDER — PROCHLORPERAZINE EDISYLATE 5 MG/ML
10 INJECTION INTRAMUSCULAR; INTRAVENOUS EVERY 6 HOURS PRN
Status: DISCONTINUED | OUTPATIENT
Start: 2025-07-25 | End: 2025-07-25 | Stop reason: HOSPADM

## 2025-07-25 RX ORDER — FAMOTIDINE 10 MG/ML
20 INJECTION, SOLUTION INTRAVENOUS ONCE AS NEEDED
Status: DISCONTINUED | OUTPATIENT
Start: 2025-07-25 | End: 2025-07-25 | Stop reason: HOSPADM

## 2025-07-25 RX ORDER — PROCHLORPERAZINE MALEATE 10 MG
10 TABLET ORAL EVERY 6 HOURS PRN
Status: DISCONTINUED | OUTPATIENT
Start: 2025-07-25 | End: 2025-07-25 | Stop reason: HOSPADM

## 2025-07-25 RX ADMIN — HEPARIN 500 UNITS: 100 SYRINGE at 13:22

## 2025-07-25 RX ADMIN — ONDANSETRON 8 MG: 2 INJECTION INTRAMUSCULAR; INTRAVENOUS at 11:49

## 2025-07-25 RX ADMIN — GEMCITABINE 2021.6 MG: 38 INJECTION, SOLUTION INTRAVENOUS at 12:43

## 2025-07-25 RX ADMIN — PACLITAXEL 255 MG: 100 INJECTION, POWDER, LYOPHILIZED, FOR SUSPENSION INTRAVENOUS at 12:03

## 2025-07-25 ASSESSMENT — PAIN SCALES - GENERAL: PAINLEVEL_OUTOF10: 0-NO PAIN

## 2025-07-25 NOTE — SIGNIFICANT EVENT
07/25/25 1100   Prechemo Checklist   Parameters Met (!) No   Reasons Parameters Not Met Other (See Comments)  (bp low at 97/49)   Provider Notified Yes   Provider Name Andrea Alaniz   Is Patient Proceeding With Treatment? Yes

## 2025-07-25 NOTE — PATIENT INSTRUCTIONS
Blood Pressure medicine- Please monitor your bp daily. Prior to taking medication, if bp is less than 130/? do not take your Losartan (50mg).     If you don't take the Losartan, please monitor your bp every 3-4 hours to ensure it says below 140/?.  If your systolic number is over 150/?, take your Losartan.    Please keep a log of daily BP (am and pm) to take to PCP appt in August.

## 2025-07-25 NOTE — PROGRESS NOTES
PT received ordered medication without incident. RN reviewed with PT and wife BP meds; PT has an August appt with PCP to review. In the meantime, PT instructed not to take his Losartan if his systolic number is <130 and then monitor is BP every 3-4 hours. He will take if systolic is greater than 150.    RN also discussed keeping a log of PT's am and pm BP to review with PCP.    PT and wife verbalized understanding.

## 2025-07-25 NOTE — TELEPHONE ENCOUNTER
"Notified by infusion RN of patient's blood pressure below baseline today.  Reported that patient stated he was feeling well with no symptoms.  Patient checked blood pressure this morning and reported it was about 106/69 before taking his blood pressure medications.  I called patient this afternoon to check-in and patient has not been home yet to recheck his blood pressure but reports that he continues to feel well and says that \"everything is good.\"  He reports that he does have ongoing swelling in his lower extremities that resolves overnight but begins to accumulate again after being on his feet for few hours each day.  He says that he has not taken the Lasix recently and is aware that this can affect his blood pressure as well.  I asked patient to check his blood pressure when he gets home and to keep a journal of his blood pressure twice a day including in the mornings before he takes his blood pressure medications and to update his cardiologist to see if they want to see him earlier than the end of August and patient verbalized understanding.  Advised patient to call us if his edema does not resolve overnight or if he has any other new or worsening symptoms.  He denies chest pain, heart palpitations, shortness of breath, lower extremity pain or other concerns.  "

## 2025-07-31 ENCOUNTER — TELEPHONE (OUTPATIENT)
Dept: HEMATOLOGY/ONCOLOGY | Facility: CLINIC | Age: 60
End: 2025-07-31
Payer: COMMERCIAL

## 2025-07-31 NOTE — TELEPHONE ENCOUNTER
"Spoke with the patient. States he has had swelling and tingling in his bilat feet. States swelling how now spread to his calves and thighs. States bottom of his feet are now getting numb. Has had US of his legs previously and was negative for blood clots. States compression stockings helps to keep the swelling down but it is never \"gone.\" States he took 3 water pills yesterday and he barely voided.     States when he wakes up in the morning bilat leg swelling \"isn't bad.\" Gets progressively worse throughout the day. Wondering if there is anything additional he should do. Explained I would update Dr. Mesa and team and call him back once I receive a response.   "

## 2025-07-31 NOTE — TELEPHONE ENCOUNTER
Reason for Conversation  Chemo Related Symptoms    Background   Swelling in pt lower extremities is not going away. He took 3 water pills yesterday and barely urinated. Now he is having tingling below the knees and it feels like his legs are asleep. Wants to know if he can have an U/S to see why it is not getting better. Pt of Dr Mesa.    Disposition   No disposition on file.

## 2025-07-31 NOTE — TELEPHONE ENCOUNTER
Spoke with the patient. Provided update from Dr. Mesa. Pt verbalized plan back to me and had no further questions or concerns at this time. Aware to call office for any new/worsening s/s.

## 2025-08-03 DIAGNOSIS — I10 PRIMARY HYPERTENSION: ICD-10-CM

## 2025-08-04 RX ORDER — METOPROLOL SUCCINATE 50 MG/1
50 TABLET, EXTENDED RELEASE ORAL DAILY
Qty: 90 TABLET | Refills: 3 | Status: SHIPPED | OUTPATIENT
Start: 2025-08-04 | End: 2026-08-04

## 2025-08-04 NOTE — TELEPHONE ENCOUNTER
Received request for prescription refills for patient.   Patient follows with Dr. Puente    Request is for metoprolol succinate  Is patient currently on medication yes    Last OV 5/8/2025  Next OV 8/28/2025    Pended for signing and sent to provider

## 2025-08-06 DIAGNOSIS — I10 PRIMARY HYPERTENSION: ICD-10-CM

## 2025-08-06 DIAGNOSIS — I25.10 CORONARY ARTERY DISEASE INVOLVING NATIVE CORONARY ARTERY OF NATIVE HEART WITHOUT ANGINA PECTORIS: ICD-10-CM

## 2025-08-06 RX ORDER — CLOPIDOGREL BISULFATE 75 MG/1
75 TABLET ORAL DAILY
Qty: 90 TABLET | Refills: 0 | Status: SHIPPED | OUTPATIENT
Start: 2025-08-06

## 2025-08-06 RX ORDER — LOSARTAN POTASSIUM 50 MG/1
50 TABLET ORAL DAILY
Qty: 90 TABLET | Refills: 0 | Status: SHIPPED | OUTPATIENT
Start: 2025-08-06

## 2025-08-06 NOTE — TELEPHONE ENCOUNTER
Received request for prescription refills for patient.   Patient follows with Dr Puente    Request is for Clopidogrel, Losartan  Is patient currently on medication Y    Last OV 5/8/25  Next OV 8/28/25    Pended for signing and sent to provider

## 2025-08-07 ENCOUNTER — APPOINTMENT (OUTPATIENT)
Dept: HEMATOLOGY/ONCOLOGY | Facility: CLINIC | Age: 60
End: 2025-08-07
Payer: COMMERCIAL

## 2025-08-11 DIAGNOSIS — E78.2 MIXED HYPERLIPIDEMIA: ICD-10-CM

## 2025-08-11 RX ORDER — ATORVASTATIN CALCIUM 40 MG/1
40 TABLET, FILM COATED ORAL NIGHTLY
Qty: 90 TABLET | Refills: 3 | Status: SHIPPED | OUTPATIENT
Start: 2025-08-11 | End: 2026-08-11

## 2025-08-13 ENCOUNTER — APPOINTMENT (OUTPATIENT)
Dept: HEMATOLOGY/ONCOLOGY | Facility: CLINIC | Age: 60
End: 2025-08-13
Payer: COMMERCIAL

## 2025-08-14 ENCOUNTER — APPOINTMENT (OUTPATIENT)
Dept: HEMATOLOGY/ONCOLOGY | Facility: CLINIC | Age: 60
End: 2025-08-14
Payer: COMMERCIAL

## 2025-08-18 ENCOUNTER — TELEPHONE (OUTPATIENT)
Dept: CARDIOLOGY | Facility: CLINIC | Age: 60
End: 2025-08-18
Payer: COMMERCIAL

## 2025-08-18 ENCOUNTER — TELEPHONE (OUTPATIENT)
Dept: HEMATOLOGY/ONCOLOGY | Facility: CLINIC | Age: 60
End: 2025-08-18
Payer: COMMERCIAL

## 2025-08-18 DIAGNOSIS — R60.0 LOWER EXTREMITY EDEMA: Primary | ICD-10-CM

## 2025-08-18 RX ORDER — HYDROCHLOROTHIAZIDE 25 MG/1
25 TABLET ORAL DAILY
Qty: 30 TABLET | Refills: 0 | Status: SHIPPED | OUTPATIENT
Start: 2025-08-18

## 2025-08-18 ASSESSMENT — ENCOUNTER SYMPTOMS
VOMITING: 0
LEG SWELLING: 1
ENDOCRINE NEGATIVE: 1
CONSTIPATION: 0
ABDOMINAL PAIN: 0
NAUSEA: 0
APPETITE CHANGE: 0
DIARRHEA: 0
UNEXPECTED WEIGHT CHANGE: 0
RESPIRATORY NEGATIVE: 1
EYES NEGATIVE: 1
HEMATOLOGIC/LYMPHATIC NEGATIVE: 1
CHILLS: 0
FATIGUE: 0
FEVER: 0
PSYCHIATRIC NEGATIVE: 1

## 2025-08-19 ENCOUNTER — OFFICE VISIT (OUTPATIENT)
Dept: HEMATOLOGY/ONCOLOGY | Facility: CLINIC | Age: 60
End: 2025-08-19
Payer: COMMERCIAL

## 2025-08-19 ENCOUNTER — HOSPITAL ENCOUNTER (OUTPATIENT)
Dept: RADIOLOGY | Facility: CLINIC | Age: 60
Discharge: HOME | End: 2025-08-19
Payer: COMMERCIAL

## 2025-08-19 ENCOUNTER — LAB (OUTPATIENT)
Dept: LAB | Facility: CLINIC | Age: 60
End: 2025-08-19
Payer: COMMERCIAL

## 2025-08-19 VITALS
DIASTOLIC BLOOD PRESSURE: 70 MMHG | TEMPERATURE: 97.5 F | SYSTOLIC BLOOD PRESSURE: 124 MMHG | HEART RATE: 90 BPM | BODY MASS INDEX: 32.67 KG/M2 | OXYGEN SATURATION: 98 % | RESPIRATION RATE: 16 BRPM | WEIGHT: 221.56 LBS

## 2025-08-19 DIAGNOSIS — C25.9 ADENOCARCINOMA OF PANCREAS (MULTI): ICD-10-CM

## 2025-08-19 DIAGNOSIS — G62.89 OTHER POLYNEUROPATHY: Primary | ICD-10-CM

## 2025-08-19 LAB
ALBUMIN SERPL BCP-MCNC: 3.3 G/DL (ref 3.4–5)
ALP SERPL-CCNC: 94 U/L (ref 33–136)
ALT SERPL W P-5'-P-CCNC: 13 U/L (ref 10–52)
ANION GAP SERPL CALC-SCNC: 10 MMOL/L (ref 10–20)
AST SERPL W P-5'-P-CCNC: 15 U/L (ref 9–39)
BASOPHILS # BLD AUTO: 0.02 X10*3/UL (ref 0–0.1)
BASOPHILS NFR BLD AUTO: 0.2 %
BILIRUB SERPL-MCNC: 0.6 MG/DL (ref 0–1.2)
BUN SERPL-MCNC: 12 MG/DL (ref 6–23)
CALCIUM SERPL-MCNC: 8.6 MG/DL (ref 8.6–10.3)
CHLORIDE SERPL-SCNC: 105 MMOL/L (ref 98–107)
CO2 SERPL-SCNC: 29 MMOL/L (ref 21–32)
CREAT SERPL-MCNC: 0.58 MG/DL (ref 0.5–1.3)
EGFRCR SERPLBLD CKD-EPI 2021: >90 ML/MIN/1.73M*2
EOSINOPHIL # BLD AUTO: 0.14 X10*3/UL (ref 0–0.7)
EOSINOPHIL NFR BLD AUTO: 1.6 %
ERYTHROCYTE [DISTWIDTH] IN BLOOD BY AUTOMATED COUNT: 17.5 % (ref 11.5–14.5)
GLUCOSE SERPL-MCNC: 89 MG/DL (ref 74–99)
HCT VFR BLD AUTO: 35.3 % (ref 41–52)
HGB BLD-MCNC: 10.6 G/DL (ref 13.5–17.5)
IMM GRANULOCYTES # BLD AUTO: 0.01 X10*3/UL (ref 0–0.7)
IMM GRANULOCYTES NFR BLD AUTO: 0.1 % (ref 0–0.9)
LYMPHOCYTES # BLD AUTO: 2.85 X10*3/UL (ref 1.2–4.8)
LYMPHOCYTES NFR BLD AUTO: 33.1 %
MCH RBC QN AUTO: 29 PG (ref 26–34)
MCHC RBC AUTO-ENTMCNC: 30 G/DL (ref 32–36)
MCV RBC AUTO: 97 FL (ref 80–100)
MONOCYTES # BLD AUTO: 0.99 X10*3/UL (ref 0.1–1)
MONOCYTES NFR BLD AUTO: 11.5 %
NEUTROPHILS # BLD AUTO: 4.59 X10*3/UL (ref 1.2–7.7)
NEUTROPHILS NFR BLD AUTO: 53.5 %
NRBC BLD-RTO: ABNORMAL /100{WBCS}
PLATELET # BLD AUTO: 272 X10*3/UL (ref 150–450)
POTASSIUM SERPL-SCNC: 4.3 MMOL/L (ref 3.5–5.3)
PROT SERPL-MCNC: 5.6 G/DL (ref 6.4–8.2)
RBC # BLD AUTO: 3.65 X10*6/UL (ref 4.5–5.9)
SODIUM SERPL-SCNC: 140 MMOL/L (ref 136–145)
WBC # BLD AUTO: 8.6 X10*3/UL (ref 4.4–11.3)

## 2025-08-19 PROCEDURE — 2550000001 HC RX 255 CONTRASTS

## 2025-08-19 PROCEDURE — 84075 ASSAY ALKALINE PHOSPHATASE: CPT

## 2025-08-19 PROCEDURE — 85025 COMPLETE CBC W/AUTO DIFF WBC: CPT

## 2025-08-19 PROCEDURE — 3078F DIAST BP <80 MM HG: CPT

## 2025-08-19 PROCEDURE — 74177 CT ABD & PELVIS W/CONTRAST: CPT

## 2025-08-19 PROCEDURE — 36415 COLL VENOUS BLD VENIPUNCTURE: CPT

## 2025-08-19 PROCEDURE — 99215 OFFICE O/P EST HI 40 MIN: CPT

## 2025-08-19 PROCEDURE — 86301 IMMUNOASSAY TUMOR CA 19-9: CPT

## 2025-08-19 PROCEDURE — 99215 OFFICE O/P EST HI 40 MIN: CPT | Mod: 25

## 2025-08-19 PROCEDURE — 99417 PROLNG OP E/M EACH 15 MIN: CPT

## 2025-08-19 PROCEDURE — 3074F SYST BP LT 130 MM HG: CPT

## 2025-08-19 RX ORDER — GABAPENTIN 300 MG/1
300 CAPSULE ORAL NIGHTLY
Qty: 30 CAPSULE | Refills: 2 | Status: SHIPPED | OUTPATIENT
Start: 2025-08-19

## 2025-08-19 RX ORDER — GABAPENTIN 100 MG/1
100 CAPSULE ORAL
Qty: 60 CAPSULE | Refills: 2 | Status: SHIPPED | OUTPATIENT
Start: 2025-08-19

## 2025-08-19 RX ADMIN — IOHEXOL 75 ML: 350 INJECTION, SOLUTION INTRAVENOUS at 15:30

## 2025-08-19 ASSESSMENT — ENCOUNTER SYMPTOMS
SHORTNESS OF BREATH: 0
PALPITATIONS: 0
BACK PAIN: 1
FLANK PAIN: 1
NUMBNESS: 1
COUGH: 0

## 2025-08-19 ASSESSMENT — PAIN SCALES - GENERAL: PAINLEVEL_OUTOF10: 0-NO PAIN

## 2025-08-20 LAB — CANCER AG19-9 SERPL-ACNC: <4 U/ML

## 2025-08-20 ASSESSMENT — ENCOUNTER SYMPTOMS
HEMATOLOGIC/LYMPHATIC NEGATIVE: 1
DIARRHEA: 0
NUMBNESS: 1
APPETITE CHANGE: 0
CONSTIPATION: 0
PALPITATIONS: 0
FATIGUE: 0
SHORTNESS OF BREATH: 0
UNEXPECTED WEIGHT CHANGE: 0
LEG SWELLING: 1
CHILLS: 0
ENDOCRINE NEGATIVE: 1
ABDOMINAL PAIN: 0
BACK PAIN: 1
VOMITING: 0
RESPIRATORY NEGATIVE: 1
FLANK PAIN: 1
COUGH: 0
NAUSEA: 0
FEVER: 0
EYES NEGATIVE: 1

## 2025-08-21 ENCOUNTER — APPOINTMENT (OUTPATIENT)
Dept: HEMATOLOGY/ONCOLOGY | Facility: CLINIC | Age: 60
End: 2025-08-21
Payer: COMMERCIAL

## 2025-08-22 ENCOUNTER — APPOINTMENT (OUTPATIENT)
Dept: HEMATOLOGY/ONCOLOGY | Facility: CLINIC | Age: 60
End: 2025-08-22
Payer: COMMERCIAL

## 2025-08-22 ENCOUNTER — OFFICE VISIT (OUTPATIENT)
Dept: HEMATOLOGY/ONCOLOGY | Facility: CLINIC | Age: 60
End: 2025-08-22
Payer: COMMERCIAL

## 2025-08-22 ENCOUNTER — INFUSION (OUTPATIENT)
Dept: HEMATOLOGY/ONCOLOGY | Facility: CLINIC | Age: 60
End: 2025-08-22
Payer: COMMERCIAL

## 2025-08-22 VITALS
DIASTOLIC BLOOD PRESSURE: 64 MMHG | BODY MASS INDEX: 30.52 KG/M2 | RESPIRATION RATE: 17 BRPM | HEART RATE: 79 BPM | SYSTOLIC BLOOD PRESSURE: 112 MMHG | WEIGHT: 207.01 LBS | TEMPERATURE: 96.4 F | OXYGEN SATURATION: 95 %

## 2025-08-22 DIAGNOSIS — R60.0 LOWER EXTREMITY EDEMA: ICD-10-CM

## 2025-08-22 DIAGNOSIS — G62.89 OTHER POLYNEUROPATHY: ICD-10-CM

## 2025-08-22 DIAGNOSIS — C25.9 ADENOCARCINOMA OF PANCREAS (MULTI): ICD-10-CM

## 2025-08-22 DIAGNOSIS — E55.9 VITAMIN D DEFICIENCY: ICD-10-CM

## 2025-08-22 DIAGNOSIS — E55.9 VITAMIN D DEFICIENCY: Primary | ICD-10-CM

## 2025-08-22 DIAGNOSIS — K86.89 MASS OF HEAD OF PANCREAS (HHS-HCC): ICD-10-CM

## 2025-08-22 PROCEDURE — 82746 ASSAY OF FOLIC ACID SERUM: CPT

## 2025-08-22 PROCEDURE — 3074F SYST BP LT 130 MM HG: CPT

## 2025-08-22 PROCEDURE — 83520 IMMUNOASSAY QUANT NOS NONAB: CPT

## 2025-08-22 PROCEDURE — 96413 CHEMO IV INFUSION 1 HR: CPT

## 2025-08-22 PROCEDURE — 3078F DIAST BP <80 MM HG: CPT

## 2025-08-22 PROCEDURE — 96375 TX/PRO/DX INJ NEW DRUG ADDON: CPT | Mod: INF

## 2025-08-22 PROCEDURE — 82306 VITAMIN D 25 HYDROXY: CPT

## 2025-08-22 PROCEDURE — 82607 VITAMIN B-12: CPT

## 2025-08-22 PROCEDURE — 2500000004 HC RX 250 GENERAL PHARMACY W/ HCPCS (ALT 636 FOR OP/ED): Performed by: INTERNAL MEDICINE

## 2025-08-22 PROCEDURE — 99215 OFFICE O/P EST HI 40 MIN: CPT

## 2025-08-22 PROCEDURE — 99215 OFFICE O/P EST HI 40 MIN: CPT | Mod: 25

## 2025-08-22 PROCEDURE — 96417 CHEMO IV INFUS EACH ADDL SEQ: CPT

## 2025-08-22 RX ORDER — HEPARIN SODIUM,PORCINE/PF 10 UNIT/ML
50 SYRINGE (ML) INTRAVENOUS AS NEEDED
OUTPATIENT
Start: 2025-08-22

## 2025-08-22 RX ORDER — ONDANSETRON HYDROCHLORIDE 2 MG/ML
8 INJECTION, SOLUTION INTRAVENOUS ONCE
Status: COMPLETED | OUTPATIENT
Start: 2025-08-22 | End: 2025-08-22

## 2025-08-22 RX ORDER — FAMOTIDINE 10 MG/ML
20 INJECTION, SOLUTION INTRAVENOUS ONCE AS NEEDED
Status: DISCONTINUED | OUTPATIENT
Start: 2025-08-22 | End: 2025-08-22 | Stop reason: HOSPADM

## 2025-08-22 RX ORDER — EPINEPHRINE 0.3 MG/.3ML
0.3 INJECTION SUBCUTANEOUS EVERY 5 MIN PRN
Status: DISCONTINUED | OUTPATIENT
Start: 2025-08-22 | End: 2025-08-22 | Stop reason: HOSPADM

## 2025-08-22 RX ORDER — PREDNISONE 10 MG/1
TABLET ORAL
Qty: 55 TABLET | Refills: 0 | Status: SHIPPED | OUTPATIENT
Start: 2025-08-22 | End: 2025-09-16

## 2025-08-22 RX ORDER — PROCHLORPERAZINE MALEATE 10 MG
10 TABLET ORAL EVERY 6 HOURS PRN
Status: DISCONTINUED | OUTPATIENT
Start: 2025-08-22 | End: 2025-08-22 | Stop reason: HOSPADM

## 2025-08-22 RX ORDER — ALBUTEROL SULFATE 0.83 MG/ML
3 SOLUTION RESPIRATORY (INHALATION) AS NEEDED
Status: DISCONTINUED | OUTPATIENT
Start: 2025-08-22 | End: 2025-08-22 | Stop reason: HOSPADM

## 2025-08-22 RX ORDER — HEPARIN 100 UNIT/ML
500 SYRINGE INTRAVENOUS AS NEEDED
OUTPATIENT
Start: 2025-08-22

## 2025-08-22 RX ORDER — DIPHENHYDRAMINE HYDROCHLORIDE 50 MG/ML
50 INJECTION, SOLUTION INTRAMUSCULAR; INTRAVENOUS AS NEEDED
Status: DISCONTINUED | OUTPATIENT
Start: 2025-08-22 | End: 2025-08-22 | Stop reason: HOSPADM

## 2025-08-22 RX ORDER — PROCHLORPERAZINE EDISYLATE 5 MG/ML
10 INJECTION INTRAMUSCULAR; INTRAVENOUS EVERY 6 HOURS PRN
Status: DISCONTINUED | OUTPATIENT
Start: 2025-08-22 | End: 2025-08-22 | Stop reason: HOSPADM

## 2025-08-22 RX ADMIN — ONDANSETRON 8 MG: 2 INJECTION INTRAMUSCULAR; INTRAVENOUS at 09:40

## 2025-08-22 RX ADMIN — PACLITAXEL 255 MG: 100 INJECTION, POWDER, LYOPHILIZED, FOR SUSPENSION INTRAVENOUS at 10:11

## 2025-08-22 RX ADMIN — GEMCITABINE 2021.6 MG: 38 INJECTION, SOLUTION INTRAVENOUS at 10:59

## 2025-08-22 ASSESSMENT — PAIN SCALES - GENERAL: PAINLEVEL_OUTOF10: 0-NO PAIN

## 2025-08-22 ASSESSMENT — ENCOUNTER SYMPTOMS: SLEEP DISTURBANCE: 1

## 2025-08-23 LAB
25(OH)D3 SERPL-MCNC: 35 NG/ML (ref 30–100)
FOLATE SERPL-MCNC: 17.8 NG/ML
VIT B12 SERPL-MCNC: 393 PG/ML (ref 211–911)

## 2025-08-25 ASSESSMENT — ENCOUNTER SYMPTOMS
COUGH: 0
VOMITING: 0
APPETITE CHANGE: 0
PALPITATIONS: 0
DIARRHEA: 0
NUMBNESS: 1
FATIGUE: 0
UNEXPECTED WEIGHT CHANGE: 0
CONSTIPATION: 0
CHILLS: 0
NAUSEA: 0
HEMATOLOGIC/LYMPHATIC NEGATIVE: 1
RESPIRATORY NEGATIVE: 1
ENDOCRINE NEGATIVE: 1
EYES NEGATIVE: 1
SHORTNESS OF BREATH: 0
ABDOMINAL PAIN: 0

## 2025-08-26 LAB — VEGF SERPL-MCNC: 37 PG/ML (ref 9–86)

## 2025-08-28 ENCOUNTER — APPOINTMENT (OUTPATIENT)
Dept: CARDIOLOGY | Facility: CLINIC | Age: 60
End: 2025-08-28
Payer: COMMERCIAL

## 2025-08-28 VITALS
BODY MASS INDEX: 30.11 KG/M2 | HEART RATE: 68 BPM | HEIGHT: 69 IN | WEIGHT: 203.3 LBS | SYSTOLIC BLOOD PRESSURE: 104 MMHG | DIASTOLIC BLOOD PRESSURE: 64 MMHG

## 2025-08-28 DIAGNOSIS — I21.4 NSTEMI, INITIAL EPISODE OF CARE (MULTI): ICD-10-CM

## 2025-08-28 DIAGNOSIS — F17.200 CURRENT EVERY DAY SMOKER: ICD-10-CM

## 2025-08-28 DIAGNOSIS — E78.2 MIXED HYPERLIPIDEMIA: ICD-10-CM

## 2025-08-28 DIAGNOSIS — I25.10 CORONARY ARTERY DISEASE INVOLVING NATIVE CORONARY ARTERY OF NATIVE HEART WITHOUT ANGINA PECTORIS: ICD-10-CM

## 2025-08-28 DIAGNOSIS — R60.0 LOWER EXTREMITY EDEMA: ICD-10-CM

## 2025-08-28 DIAGNOSIS — I10 PRIMARY HYPERTENSION: ICD-10-CM

## 2025-08-28 DIAGNOSIS — C25.9 ADENOCARCINOMA OF PANCREAS (MULTI): ICD-10-CM

## 2025-08-28 DIAGNOSIS — Z95.5 HISTORY OF CORONARY ARTERY STENT PLACEMENT: ICD-10-CM

## 2025-08-28 PROCEDURE — 3078F DIAST BP <80 MM HG: CPT | Performed by: INTERNAL MEDICINE

## 2025-08-28 PROCEDURE — 99214 OFFICE O/P EST MOD 30 MIN: CPT | Performed by: INTERNAL MEDICINE

## 2025-08-28 PROCEDURE — 3008F BODY MASS INDEX DOCD: CPT | Performed by: INTERNAL MEDICINE

## 2025-08-28 PROCEDURE — 3074F SYST BP LT 130 MM HG: CPT | Performed by: INTERNAL MEDICINE

## 2025-08-28 RX ORDER — FUROSEMIDE 20 MG/1
40 TABLET ORAL DAILY PRN
Qty: 15 TABLET | Refills: 11 | Status: SHIPPED | OUTPATIENT
Start: 2025-08-28 | End: 2026-08-28

## 2025-08-28 RX ORDER — LOSARTAN POTASSIUM 50 MG/1
50 TABLET ORAL DAILY
Qty: 90 TABLET | Refills: 3 | Status: SHIPPED | OUTPATIENT
Start: 2025-08-28

## 2025-08-28 RX ORDER — METOPROLOL SUCCINATE 50 MG/1
50 TABLET, EXTENDED RELEASE ORAL DAILY
Qty: 90 TABLET | Refills: 3 | Status: SHIPPED | OUTPATIENT
Start: 2025-08-28 | End: 2026-08-28

## 2025-08-28 RX ORDER — PYRIDOXINE HCL (VITAMIN B6) 100 MG
100 TABLET ORAL DAILY
COMMUNITY

## 2025-08-28 RX ORDER — CLOPIDOGREL BISULFATE 75 MG/1
75 TABLET ORAL DAILY
Qty: 90 TABLET | Refills: 3 | Status: SHIPPED | OUTPATIENT
Start: 2025-08-28

## 2025-08-28 RX ORDER — POTASSIUM CHLORIDE 750 MG/1
20 TABLET, FILM COATED, EXTENDED RELEASE ORAL DAILY
Qty: 180 TABLET | Refills: 3 | Status: SHIPPED | OUTPATIENT
Start: 2025-08-28 | End: 2026-08-28

## 2025-08-28 RX ORDER — HYDROCHLOROTHIAZIDE 25 MG/1
25 TABLET ORAL DAILY
Qty: 90 TABLET | Refills: 3 | Status: SHIPPED | OUTPATIENT
Start: 2025-08-28

## 2025-08-28 RX ORDER — NITROGLYCERIN 0.4 MG/1
0.4 TABLET SUBLINGUAL EVERY 5 MIN PRN
Qty: 25 TABLET | Refills: 5 | Status: SHIPPED | OUTPATIENT
Start: 2025-08-28

## 2025-08-28 RX ORDER — ATORVASTATIN CALCIUM 40 MG/1
40 TABLET, FILM COATED ORAL NIGHTLY
Qty: 90 TABLET | Refills: 3 | Status: SHIPPED | OUTPATIENT
Start: 2025-08-28 | End: 2026-08-28

## 2025-08-29 ENCOUNTER — INFUSION (OUTPATIENT)
Dept: HEMATOLOGY/ONCOLOGY | Facility: CLINIC | Age: 60
End: 2025-08-29
Payer: COMMERCIAL

## 2025-08-29 ENCOUNTER — OFFICE VISIT (OUTPATIENT)
Dept: HEMATOLOGY/ONCOLOGY | Facility: CLINIC | Age: 60
End: 2025-08-29
Payer: COMMERCIAL

## 2025-08-29 VITALS
TEMPERATURE: 96.8 F | WEIGHT: 200.62 LBS | OXYGEN SATURATION: 96 % | DIASTOLIC BLOOD PRESSURE: 70 MMHG | RESPIRATION RATE: 17 BRPM | SYSTOLIC BLOOD PRESSURE: 118 MMHG | HEART RATE: 68 BPM | BODY MASS INDEX: 29.63 KG/M2

## 2025-08-29 DIAGNOSIS — R60.0 LOWER EXTREMITY EDEMA: ICD-10-CM

## 2025-08-29 DIAGNOSIS — C25.9 ADENOCARCINOMA OF PANCREAS (MULTI): Primary | ICD-10-CM

## 2025-08-29 DIAGNOSIS — C25.9 ADENOCARCINOMA OF PANCREAS (MULTI): ICD-10-CM

## 2025-08-29 LAB
ALBUMIN SERPL BCP-MCNC: 3.5 G/DL (ref 3.4–5)
ALP SERPL-CCNC: 69 U/L (ref 33–136)
ALT SERPL W P-5'-P-CCNC: 31 U/L (ref 10–52)
ANION GAP SERPL CALC-SCNC: 12 MMOL/L (ref 10–20)
AST SERPL W P-5'-P-CCNC: 28 U/L (ref 9–39)
BASOPHILS # BLD AUTO: 0.01 X10*3/UL (ref 0–0.1)
BASOPHILS NFR BLD AUTO: 0.1 %
BILIRUB SERPL-MCNC: 0.6 MG/DL (ref 0–1.2)
BUN SERPL-MCNC: 13 MG/DL (ref 6–23)
CALCIUM SERPL-MCNC: 9 MG/DL (ref 8.6–10.3)
CHLORIDE SERPL-SCNC: 98 MMOL/L (ref 98–107)
CO2 SERPL-SCNC: 31 MMOL/L (ref 21–32)
CREAT SERPL-MCNC: 0.61 MG/DL (ref 0.5–1.3)
EGFRCR SERPLBLD CKD-EPI 2021: >90 ML/MIN/1.73M*2
EOSINOPHIL # BLD AUTO: 0.1 X10*3/UL (ref 0–0.7)
EOSINOPHIL NFR BLD AUTO: 0.9 %
ERYTHROCYTE [DISTWIDTH] IN BLOOD BY AUTOMATED COUNT: 16.9 % (ref 11.5–14.5)
GLUCOSE SERPL-MCNC: 100 MG/DL (ref 74–99)
HCT VFR BLD AUTO: 34.2 % (ref 41–52)
HGB BLD-MCNC: 10.6 G/DL (ref 13.5–17.5)
IMM GRANULOCYTES # BLD AUTO: 0.41 X10*3/UL (ref 0–0.7)
IMM GRANULOCYTES NFR BLD AUTO: 3.7 % (ref 0–0.9)
LYMPHOCYTES # BLD AUTO: 3.33 X10*3/UL (ref 1.2–4.8)
LYMPHOCYTES NFR BLD AUTO: 30 %
MCH RBC QN AUTO: 28.7 PG (ref 26–34)
MCHC RBC AUTO-ENTMCNC: 31 G/DL (ref 32–36)
MCV RBC AUTO: 93 FL (ref 80–100)
MONOCYTES # BLD AUTO: 0.89 X10*3/UL (ref 0.1–1)
MONOCYTES NFR BLD AUTO: 8 %
NEUTROPHILS # BLD AUTO: 6.35 X10*3/UL (ref 1.2–7.7)
NEUTROPHILS NFR BLD AUTO: 57.3 %
NRBC BLD-RTO: ABNORMAL /100{WBCS}
PLATELET # BLD AUTO: 186 X10*3/UL (ref 150–450)
POTASSIUM SERPL-SCNC: 3 MMOL/L (ref 3.5–5.3)
PROT SERPL-MCNC: 6.1 G/DL (ref 6.4–8.2)
RBC # BLD AUTO: 3.69 X10*6/UL (ref 4.5–5.9)
SODIUM SERPL-SCNC: 138 MMOL/L (ref 136–145)
WBC # BLD AUTO: 11.1 X10*3/UL (ref 4.4–11.3)

## 2025-08-29 PROCEDURE — 96375 TX/PRO/DX INJ NEW DRUG ADDON: CPT | Mod: INF

## 2025-08-29 PROCEDURE — 2500000004 HC RX 250 GENERAL PHARMACY W/ HCPCS (ALT 636 FOR OP/ED): Performed by: INTERNAL MEDICINE

## 2025-08-29 PROCEDURE — 85025 COMPLETE CBC W/AUTO DIFF WBC: CPT

## 2025-08-29 PROCEDURE — 96413 CHEMO IV INFUSION 1 HR: CPT

## 2025-08-29 PROCEDURE — 84075 ASSAY ALKALINE PHOSPHATASE: CPT

## 2025-08-29 PROCEDURE — 99215 OFFICE O/P EST HI 40 MIN: CPT | Mod: 25

## 2025-08-29 PROCEDURE — 3078F DIAST BP <80 MM HG: CPT

## 2025-08-29 PROCEDURE — 2500000002 HC RX 250 W HCPCS SELF ADMINISTERED DRUGS (ALT 637 FOR MEDICARE OP, ALT 636 FOR OP/ED)

## 2025-08-29 PROCEDURE — 3074F SYST BP LT 130 MM HG: CPT

## 2025-08-29 PROCEDURE — 99215 OFFICE O/P EST HI 40 MIN: CPT

## 2025-08-29 PROCEDURE — 96417 CHEMO IV INFUS EACH ADDL SEQ: CPT

## 2025-08-29 RX ORDER — ALBUTEROL SULFATE 0.83 MG/ML
3 SOLUTION RESPIRATORY (INHALATION) AS NEEDED
Status: DISCONTINUED | OUTPATIENT
Start: 2025-08-29 | End: 2025-08-29 | Stop reason: HOSPADM

## 2025-08-29 RX ORDER — FAMOTIDINE 10 MG/ML
20 INJECTION, SOLUTION INTRAVENOUS ONCE AS NEEDED
Status: DISCONTINUED | OUTPATIENT
Start: 2025-08-29 | End: 2025-08-29 | Stop reason: HOSPADM

## 2025-08-29 RX ORDER — PREDNISONE 10 MG/1
10 TABLET ORAL DAILY
Qty: 50 TABLET | Refills: 0 | Status: SHIPPED | OUTPATIENT
Start: 2025-08-29

## 2025-08-29 RX ORDER — ONDANSETRON HYDROCHLORIDE 2 MG/ML
8 INJECTION, SOLUTION INTRAVENOUS ONCE
Status: COMPLETED | OUTPATIENT
Start: 2025-08-29 | End: 2025-08-29

## 2025-08-29 RX ORDER — PROCHLORPERAZINE MALEATE 10 MG
10 TABLET ORAL EVERY 6 HOURS PRN
Status: DISCONTINUED | OUTPATIENT
Start: 2025-08-29 | End: 2025-08-29 | Stop reason: HOSPADM

## 2025-08-29 RX ORDER — DIPHENHYDRAMINE HYDROCHLORIDE 50 MG/ML
50 INJECTION, SOLUTION INTRAMUSCULAR; INTRAVENOUS AS NEEDED
Status: DISCONTINUED | OUTPATIENT
Start: 2025-08-29 | End: 2025-08-29 | Stop reason: HOSPADM

## 2025-08-29 RX ORDER — EPINEPHRINE 0.3 MG/.3ML
0.3 INJECTION SUBCUTANEOUS EVERY 5 MIN PRN
Status: DISCONTINUED | OUTPATIENT
Start: 2025-08-29 | End: 2025-08-29 | Stop reason: HOSPADM

## 2025-08-29 RX ORDER — PROCHLORPERAZINE EDISYLATE 5 MG/ML
10 INJECTION INTRAMUSCULAR; INTRAVENOUS EVERY 6 HOURS PRN
Status: DISCONTINUED | OUTPATIENT
Start: 2025-08-29 | End: 2025-08-29 | Stop reason: HOSPADM

## 2025-08-29 RX ORDER — POTASSIUM CHLORIDE 750 MG/1
40 TABLET, FILM COATED, EXTENDED RELEASE ORAL ONCE
Status: CANCELLED | OUTPATIENT
Start: 2025-08-29

## 2025-08-29 RX ORDER — POTASSIUM CHLORIDE 750 MG/1
40 TABLET, FILM COATED, EXTENDED RELEASE ORAL ONCE
Status: COMPLETED | OUTPATIENT
Start: 2025-08-29 | End: 2025-08-29

## 2025-08-29 RX ADMIN — ONDANSETRON 8 MG: 2 INJECTION INTRAMUSCULAR; INTRAVENOUS at 09:28

## 2025-08-29 RX ADMIN — GEMCITABINE 2021.6 MG: 38 INJECTION, SOLUTION INTRAVENOUS at 10:43

## 2025-08-29 RX ADMIN — POTASSIUM CHLORIDE 40 MEQ: 750 TABLET, FILM COATED, EXTENDED RELEASE ORAL at 09:27

## 2025-08-29 RX ADMIN — PACLITAXEL 255 MG: 100 INJECTION, POWDER, LYOPHILIZED, FOR SUSPENSION INTRAVENOUS at 10:03

## 2025-08-29 ASSESSMENT — ENCOUNTER SYMPTOMS
PSYCHIATRIC NEGATIVE: 1
FEVER: 1
MUSCULOSKELETAL NEGATIVE: 1

## 2025-08-29 ASSESSMENT — PAIN SCALES - GENERAL: PAINLEVEL_OUTOF10: 0-NO PAIN

## 2025-09-04 ENCOUNTER — LAB (OUTPATIENT)
Dept: LAB | Facility: CLINIC | Age: 60
End: 2025-09-04
Payer: COMMERCIAL

## 2025-09-04 DIAGNOSIS — C25.9 ADENOCARCINOMA OF PANCREAS (MULTI): ICD-10-CM

## 2025-09-04 DIAGNOSIS — I25.10 CORONARY ARTERY DISEASE INVOLVING NATIVE CORONARY ARTERY OF NATIVE HEART WITHOUT ANGINA PECTORIS: ICD-10-CM

## 2025-09-04 LAB
ALBUMIN SERPL BCP-MCNC: 3.8 G/DL (ref 3.4–5)
ALP SERPL-CCNC: 72 U/L (ref 33–136)
ALT SERPL W P-5'-P-CCNC: 45 U/L (ref 10–52)
ANION GAP SERPL CALC-SCNC: 12 MMOL/L (ref 10–20)
AST SERPL W P-5'-P-CCNC: 33 U/L (ref 9–39)
BASOPHILS # BLD AUTO: 0.01 X10*3/UL (ref 0–0.1)
BASOPHILS NFR BLD AUTO: 0.1 %
BILIRUB SERPL-MCNC: 0.5 MG/DL (ref 0–1.2)
BUN SERPL-MCNC: 18 MG/DL (ref 6–23)
CALCIUM SERPL-MCNC: 8.8 MG/DL (ref 8.6–10.3)
CHLORIDE SERPL-SCNC: 100 MMOL/L (ref 98–107)
CO2 SERPL-SCNC: 27 MMOL/L (ref 21–32)
CREAT SERPL-MCNC: 0.71 MG/DL (ref 0.5–1.3)
EGFRCR SERPLBLD CKD-EPI 2021: >90 ML/MIN/1.73M*2
EOSINOPHIL # BLD AUTO: 0.02 X10*3/UL (ref 0–0.7)
EOSINOPHIL NFR BLD AUTO: 0.3 %
ERYTHROCYTE [DISTWIDTH] IN BLOOD BY AUTOMATED COUNT: 18 % (ref 11.5–14.5)
GLUCOSE SERPL-MCNC: 145 MG/DL (ref 74–99)
HCT VFR BLD AUTO: 36.7 % (ref 41–52)
HGB BLD-MCNC: 11.4 G/DL (ref 13.5–17.5)
IMM GRANULOCYTES # BLD AUTO: 0.2 X10*3/UL (ref 0–0.7)
IMM GRANULOCYTES NFR BLD AUTO: 2.9 % (ref 0–0.9)
LYMPHOCYTES # BLD AUTO: 2.17 X10*3/UL (ref 1.2–4.8)
LYMPHOCYTES NFR BLD AUTO: 31.8 %
MCH RBC QN AUTO: 29 PG (ref 26–34)
MCHC RBC AUTO-ENTMCNC: 31.1 G/DL (ref 32–36)
MCV RBC AUTO: 93 FL (ref 80–100)
MONOCYTES # BLD AUTO: 0.26 X10*3/UL (ref 0.1–1)
MONOCYTES NFR BLD AUTO: 3.8 %
NEUTROPHILS # BLD AUTO: 4.16 X10*3/UL (ref 1.2–7.7)
NEUTROPHILS NFR BLD AUTO: 61.1 %
PLATELET # BLD AUTO: 195 X10*3/UL (ref 150–450)
POTASSIUM SERPL-SCNC: 4.5 MMOL/L (ref 3.5–5.3)
PROT SERPL-MCNC: 6.1 G/DL (ref 6.4–8.2)
RBC # BLD AUTO: 3.93 X10*6/UL (ref 4.5–5.9)
SODIUM SERPL-SCNC: 134 MMOL/L (ref 136–145)
WBC # BLD AUTO: 6.8 X10*3/UL (ref 4.4–11.3)

## 2025-09-04 PROCEDURE — 85025 COMPLETE CBC W/AUTO DIFF WBC: CPT

## 2025-09-04 PROCEDURE — 36415 COLL VENOUS BLD VENIPUNCTURE: CPT

## 2025-09-04 PROCEDURE — 80053 COMPREHEN METABOLIC PANEL: CPT

## 2025-09-04 RX ORDER — NAPROXEN SODIUM 220 MG/1
81 TABLET, FILM COATED ORAL DAILY
Qty: 90 TABLET | Refills: 0 | Status: SHIPPED | OUTPATIENT
Start: 2025-09-04

## 2025-09-05 ENCOUNTER — INFUSION (OUTPATIENT)
Dept: HEMATOLOGY/ONCOLOGY | Facility: CLINIC | Age: 60
End: 2025-09-05
Payer: COMMERCIAL

## 2025-09-05 VITALS
HEART RATE: 84 BPM | BODY MASS INDEX: 29.2 KG/M2 | WEIGHT: 197.75 LBS | RESPIRATION RATE: 17 BRPM | DIASTOLIC BLOOD PRESSURE: 70 MMHG | TEMPERATURE: 97 F | SYSTOLIC BLOOD PRESSURE: 134 MMHG | OXYGEN SATURATION: 98 %

## 2025-09-05 DIAGNOSIS — C25.9 ADENOCARCINOMA OF PANCREAS (MULTI): ICD-10-CM

## 2025-09-05 PROCEDURE — 96413 CHEMO IV INFUSION 1 HR: CPT

## 2025-09-05 PROCEDURE — 96375 TX/PRO/DX INJ NEW DRUG ADDON: CPT | Mod: INF

## 2025-09-05 PROCEDURE — 2500000004 HC RX 250 GENERAL PHARMACY W/ HCPCS (ALT 636 FOR OP/ED): Performed by: INTERNAL MEDICINE

## 2025-09-05 PROCEDURE — 96417 CHEMO IV INFUS EACH ADDL SEQ: CPT

## 2025-09-05 RX ORDER — PROCHLORPERAZINE MALEATE 10 MG
10 TABLET ORAL EVERY 6 HOURS PRN
Status: DISCONTINUED | OUTPATIENT
Start: 2025-09-05 | End: 2025-09-05 | Stop reason: HOSPADM

## 2025-09-05 RX ORDER — PROCHLORPERAZINE EDISYLATE 5 MG/ML
10 INJECTION INTRAMUSCULAR; INTRAVENOUS EVERY 6 HOURS PRN
Status: DISCONTINUED | OUTPATIENT
Start: 2025-09-05 | End: 2025-09-05 | Stop reason: HOSPADM

## 2025-09-05 RX ORDER — FAMOTIDINE 10 MG/ML
20 INJECTION, SOLUTION INTRAVENOUS ONCE AS NEEDED
Status: DISCONTINUED | OUTPATIENT
Start: 2025-09-05 | End: 2025-09-05 | Stop reason: HOSPADM

## 2025-09-05 RX ORDER — EPINEPHRINE 0.3 MG/.3ML
0.3 INJECTION SUBCUTANEOUS EVERY 5 MIN PRN
Status: DISCONTINUED | OUTPATIENT
Start: 2025-09-05 | End: 2025-09-05 | Stop reason: HOSPADM

## 2025-09-05 RX ORDER — ALBUTEROL SULFATE 0.83 MG/ML
3 SOLUTION RESPIRATORY (INHALATION) AS NEEDED
Status: DISCONTINUED | OUTPATIENT
Start: 2025-09-05 | End: 2025-09-05 | Stop reason: HOSPADM

## 2025-09-05 RX ORDER — DIPHENHYDRAMINE HYDROCHLORIDE 50 MG/ML
50 INJECTION, SOLUTION INTRAMUSCULAR; INTRAVENOUS AS NEEDED
Status: DISCONTINUED | OUTPATIENT
Start: 2025-09-05 | End: 2025-09-05 | Stop reason: HOSPADM

## 2025-09-05 RX ORDER — ONDANSETRON HYDROCHLORIDE 2 MG/ML
8 INJECTION, SOLUTION INTRAVENOUS ONCE
Status: COMPLETED | OUTPATIENT
Start: 2025-09-05 | End: 2025-09-05

## 2025-09-05 RX ADMIN — GEMCITABINE 2021.6 MG: 38 INJECTION, SOLUTION INTRAVENOUS at 10:02

## 2025-09-05 RX ADMIN — ONDANSETRON 8 MG: 2 INJECTION INTRAMUSCULAR; INTRAVENOUS at 09:15

## 2025-09-05 RX ADMIN — PACLITAXEL 255 MG: 100 INJECTION, POWDER, LYOPHILIZED, FOR SUSPENSION INTRAVENOUS at 09:31

## 2025-09-05 ASSESSMENT — PAIN SCALES - GENERAL: PAINLEVEL_OUTOF10: 0-NO PAIN

## 2025-09-15 ENCOUNTER — APPOINTMENT (OUTPATIENT)
Dept: RADIOLOGY | Facility: HOSPITAL | Age: 60
End: 2025-09-15
Payer: COMMERCIAL

## 2026-01-12 ENCOUNTER — APPOINTMENT (OUTPATIENT)
Dept: CARDIOLOGY | Facility: CLINIC | Age: 61
End: 2026-01-12
Payer: COMMERCIAL